# Patient Record
Sex: FEMALE | Race: BLACK OR AFRICAN AMERICAN | NOT HISPANIC OR LATINO | Employment: OTHER | URBAN - METROPOLITAN AREA
[De-identification: names, ages, dates, MRNs, and addresses within clinical notes are randomized per-mention and may not be internally consistent; named-entity substitution may affect disease eponyms.]

---

## 2021-06-18 ENCOUNTER — HISTORICAL (OUTPATIENT)
Dept: RADIATION THERAPY | Facility: HOSPITAL | Age: 61
End: 2021-06-18

## 2021-07-01 ENCOUNTER — HISTORICAL (OUTPATIENT)
Dept: RADIATION THERAPY | Facility: HOSPITAL | Age: 61
End: 2021-07-01

## 2021-07-08 ENCOUNTER — HISTORICAL (OUTPATIENT)
Dept: RADIOLOGY | Facility: HOSPITAL | Age: 61
End: 2021-07-08

## 2021-07-08 LAB — POC CREATININE: 0.8 MG/DL (ref 0.6–1.3)

## 2021-07-14 ENCOUNTER — HISTORICAL (OUTPATIENT)
Dept: RADIATION THERAPY | Facility: HOSPITAL | Age: 61
End: 2021-07-14

## 2021-07-15 ENCOUNTER — HISTORICAL (OUTPATIENT)
Dept: RADIATION THERAPY | Facility: HOSPITAL | Age: 61
End: 2021-07-15

## 2021-07-19 ENCOUNTER — HISTORICAL (OUTPATIENT)
Dept: RADIATION THERAPY | Facility: HOSPITAL | Age: 61
End: 2021-07-19

## 2021-07-21 ENCOUNTER — HISTORICAL (OUTPATIENT)
Dept: RADIATION THERAPY | Facility: HOSPITAL | Age: 61
End: 2021-07-21

## 2021-07-23 ENCOUNTER — HISTORICAL (OUTPATIENT)
Dept: RADIATION THERAPY | Facility: HOSPITAL | Age: 61
End: 2021-07-23

## 2021-07-26 ENCOUNTER — HISTORICAL (OUTPATIENT)
Dept: RADIATION THERAPY | Facility: HOSPITAL | Age: 61
End: 2021-07-26

## 2021-10-28 ENCOUNTER — HISTORICAL (OUTPATIENT)
Dept: RADIATION THERAPY | Facility: HOSPITAL | Age: 61
End: 2021-10-28

## 2022-05-04 DIAGNOSIS — M47.812 CERVICAL SPONDYLOSIS: Primary | ICD-10-CM

## 2022-05-17 ENCOUNTER — TELEPHONE (OUTPATIENT)
Dept: ADMINISTRATIVE | Facility: HOSPITAL | Age: 62
End: 2022-05-17
Payer: COMMERCIAL

## 2022-05-17 DIAGNOSIS — M47.812 CERVICAL SPONDYLOSIS: Primary | ICD-10-CM

## 2022-05-23 ENCOUNTER — DOCUMENTATION ONLY (OUTPATIENT)
Dept: NEUROSURGERY | Facility: CLINIC | Age: 62
End: 2022-05-23
Payer: COMMERCIAL

## 2022-06-28 DIAGNOSIS — D32.9 BENIGN MENINGIOMA: Primary | ICD-10-CM

## 2022-07-12 RX ORDER — MONTELUKAST SODIUM 10 MG/1
TABLET ORAL
COMMUNITY
Start: 2022-04-13 | End: 2023-01-18

## 2022-07-12 RX ORDER — AMLODIPINE BESYLATE 10 MG/1
10 TABLET ORAL DAILY
COMMUNITY
Start: 2022-06-23

## 2022-07-12 RX ORDER — GABAPENTIN 300 MG/1
CAPSULE ORAL
COMMUNITY
Start: 2022-06-11 | End: 2022-08-10

## 2022-07-12 RX ORDER — METOPROLOL SUCCINATE 100 MG/1
TABLET, EXTENDED RELEASE ORAL
COMMUNITY
Start: 2022-04-14 | End: 2023-07-17

## 2022-07-12 RX ORDER — METHOCARBAMOL 500 MG/1
500 TABLET, FILM COATED ORAL 3 TIMES DAILY PRN
COMMUNITY
Start: 2022-04-20 | End: 2023-01-18

## 2022-07-12 RX ORDER — ERYTHROMYCIN 5 MG/G
OINTMENT OPHTHALMIC
COMMUNITY
Start: 2022-07-01 | End: 2023-09-26

## 2022-07-12 RX ORDER — LOSARTAN POTASSIUM 100 MG/1
100 TABLET ORAL DAILY
COMMUNITY
Start: 2022-05-28

## 2022-07-12 RX ORDER — ATORVASTATIN CALCIUM 10 MG/1
10 TABLET, FILM COATED ORAL DAILY
COMMUNITY
Start: 2022-04-14

## 2022-07-12 RX ORDER — NEOMYCIN SULFATE, POLYMYXIN B SULFATE, HYDROCORTISONE 3.5; 10000; 1 MG/ML; [USP'U]/ML; MG/ML
SOLUTION/ DROPS AURICULAR (OTIC)
COMMUNITY
Start: 2022-04-13 | End: 2023-01-18

## 2022-07-12 RX ORDER — ESTRADIOL 1 MG/1
1 TABLET ORAL DAILY
COMMUNITY
Start: 2022-04-18

## 2022-07-14 ENCOUNTER — OFFICE VISIT (OUTPATIENT)
Dept: NEUROSURGERY | Facility: CLINIC | Age: 62
End: 2022-07-14
Payer: COMMERCIAL

## 2022-07-14 VITALS
HEART RATE: 80 BPM | RESPIRATION RATE: 16 BRPM | WEIGHT: 125 LBS | DIASTOLIC BLOOD PRESSURE: 74 MMHG | SYSTOLIC BLOOD PRESSURE: 121 MMHG | HEIGHT: 62 IN | BODY MASS INDEX: 23 KG/M2

## 2022-07-14 DIAGNOSIS — D32.9 BENIGN MENINGIOMA: Primary | ICD-10-CM

## 2022-07-14 DIAGNOSIS — M47.812 CERVICAL SPONDYLOSIS: ICD-10-CM

## 2022-07-14 PROCEDURE — 3008F BODY MASS INDEX DOCD: CPT | Mod: CPTII,,, | Performed by: NEUROLOGICAL SURGERY

## 2022-07-14 PROCEDURE — 1160F RVW MEDS BY RX/DR IN RCRD: CPT | Mod: CPTII,,, | Performed by: NEUROLOGICAL SURGERY

## 2022-07-14 PROCEDURE — 1160F PR REVIEW ALL MEDS BY PRESCRIBER/CLIN PHARMACIST DOCUMENTED: ICD-10-PCS | Mod: CPTII,,, | Performed by: NEUROLOGICAL SURGERY

## 2022-07-14 PROCEDURE — 99213 OFFICE O/P EST LOW 20 MIN: CPT | Mod: ,,, | Performed by: NEUROLOGICAL SURGERY

## 2022-07-14 PROCEDURE — 99213 PR OFFICE/OUTPT VISIT, EST, LEVL III, 20-29 MIN: ICD-10-PCS | Mod: ,,, | Performed by: NEUROLOGICAL SURGERY

## 2022-07-14 PROCEDURE — 3074F SYST BP LT 130 MM HG: CPT | Mod: CPTII,,, | Performed by: NEUROLOGICAL SURGERY

## 2022-07-14 PROCEDURE — 3008F PR BODY MASS INDEX (BMI) DOCUMENTED: ICD-10-PCS | Mod: CPTII,,, | Performed by: NEUROLOGICAL SURGERY

## 2022-07-14 PROCEDURE — 3078F DIAST BP <80 MM HG: CPT | Mod: CPTII,,, | Performed by: NEUROLOGICAL SURGERY

## 2022-07-14 PROCEDURE — 4010F ACE/ARB THERAPY RXD/TAKEN: CPT | Mod: CPTII,,, | Performed by: NEUROLOGICAL SURGERY

## 2022-07-14 PROCEDURE — 3074F PR MOST RECENT SYSTOLIC BLOOD PRESSURE < 130 MM HG: ICD-10-PCS | Mod: CPTII,,, | Performed by: NEUROLOGICAL SURGERY

## 2022-07-14 PROCEDURE — 1159F MED LIST DOCD IN RCRD: CPT | Mod: CPTII,,, | Performed by: NEUROLOGICAL SURGERY

## 2022-07-14 PROCEDURE — 4010F PR ACE/ARB THEARPY RXD/TAKEN: ICD-10-PCS | Mod: CPTII,,, | Performed by: NEUROLOGICAL SURGERY

## 2022-07-14 PROCEDURE — 3078F PR MOST RECENT DIASTOLIC BLOOD PRESSURE < 80 MM HG: ICD-10-PCS | Mod: CPTII,,, | Performed by: NEUROLOGICAL SURGERY

## 2022-07-14 PROCEDURE — 1159F PR MEDICATION LIST DOCUMENTED IN MEDICAL RECORD: ICD-10-PCS | Mod: CPTII,,, | Performed by: NEUROLOGICAL SURGERY

## 2022-07-14 RX ORDER — ASPIRIN 81 MG/1
81 TABLET ORAL DAILY
COMMUNITY

## 2022-07-14 RX ORDER — ERGOCALCIFEROL 1.25 MG/1
50000 CAPSULE ORAL
COMMUNITY

## 2022-07-14 RX ORDER — ASCORBIC ACID 125 MG
TABLET,CHEWABLE ORAL
COMMUNITY

## 2022-07-14 RX ORDER — METOPROLOL SUCCINATE 50 MG/1
50 TABLET, EXTENDED RELEASE ORAL EVERY MORNING
COMMUNITY
End: 2023-07-17

## 2022-07-14 RX ORDER — CHOLECALCIFEROL (VITAMIN D3) 125 MCG
CAPSULE ORAL
COMMUNITY

## 2022-07-14 RX ORDER — DENOSUMAB 60 MG/ML
60 INJECTION SUBCUTANEOUS
COMMUNITY

## 2022-07-14 NOTE — PROGRESS NOTES
[facility]  Neurosurgery  Established Patient      Kaity Cuevas   [id number]   1960       SUBJECTIVE:     History of Present Illness:  Patient is a 61 y.o. female who presents for a post CyberKnife treatment visit.  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  She underwent left anterior transpetrosal approach to skull base for resection of left petroclival meningioma by Dr. Pagan in [place] on 6/4/20.  She continues to have numbness in the left face, but feels it has continued to gradually improve.  She denies any new symptoms since her last visit here.  She continues to follow up with Dr. Pagan periodically via telemedicine.    The patient continues with pain in the neck on both sides with radiation across to the right shoulder and upper arm.  She was having pain into the left shoulder and upper arm, but says this has improved.  She is no longer having tingling in the hands.  She says the burning in the right forearm is not occurring much anymore either.  She went to physical therapy for about two months, which has helped some.  Although she continues to have good days and bad days.  She has difficulty balance.  She says it is no worse, no better.          Past Medical History:   Diagnosis Date    Benign meningioma     Carpal tunnel syndrome on right     Cervical disc displacement     Cervical spondylosis     Hyperlipidemia     Hypertension     Neoplasm, brain     Nephrolithiasis       Past Surgical History:   Procedure Laterality Date    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  07/26/2021    cyberknife for left petroclival tumor  04/20/2009    HYSTERECTOMY  2009    Left C5-6, C6-7 posterior foraminotomies  08/20/2014    [facility]    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  06/04/2020    [facility]    removal of kidney stone  1995      Current Outpatient Medications   Medication Sig Dispense Refill    amLODIPine (NORVASC)  10 MG tablet Take 10 mg by mouth once daily.      aspirin (ECOTRIN) 81 MG EC tablet Take 81 mg by mouth once daily.      atorvastatin (LIPITOR) 10 MG tablet Take 10 mg by mouth once daily.      biotin 5,000 mcg TbDL Take by mouth.      calcium carbonate/vitamin D3 (CALTRATE 600 + D ORAL) Take by mouth 2 (two) times a day.      denosumab (PROLIA) 60 mg/mL Syrg Inject 60 mg into the skin every 6 (six) months.      ergocalciferol (VITAMIN D2) 50,000 unit Cap Take 50,000 Units by mouth every 30 days.      erythromycin (ROMYCIN) ophthalmic ointment APPLY TO THE LEFT EYE 4 TIMES A DAY      estradioL (ESTRACE) 1 MG tablet Take 1 mg by mouth once daily.      gabapentin (NEURONTIN) 300 MG capsule Take by mouth.      losartan (COZAAR) 100 MG tablet Take 100 mg by mouth once daily.      metoprolol succinate (TOPROL-XL) 100 MG 24 hr tablet TAKE 1 TABLET BY MOUTH ONCE DAILY IN THE AFTERNOON.      metoprolol succinate (TOPROL-XL) 50 MG 24 hr tablet Take 50 mg by mouth every morning.      vitamin K2 100 mcg Cap Take by mouth.      methocarbamoL (ROBAXIN) 500 MG Tab Take 500 mg by mouth 3 (three) times daily as needed.      montelukast (SINGULAIR) 10 mg tablet TAKE 1 TABLET BY MOUTH EVERY DAY AS NEEDED FOR ALLERGIES      neomycin-polymyxin-hydrocortisone (CORTISPORIN) otic solution INSTILL 4 DROPS INTO LEFT EAR 3 TIMES A DAY       No current facility-administered medications for this visit.      Review of patient's allergies indicates:   Allergen Reactions    Codeine       Social History     Tobacco Use    Smoking status: Never Smoker    Smokeless tobacco: Never Used   Substance Use Topics    Alcohol use: Not on file      Family History   Problem Relation Age of Onset    Kidney disease Mother     Hyperlipidemia Mother     Hypertension Mother     Stroke Father     Kidney disease Father     Skin cancer Father     Hyperlipidemia Father     Hypertension Father     Thyroid cancer Sister     Hypertension  "Sister     Hypertension Brother     Diabetes Mellitus Paternal Grandmother     Lung cancer Paternal Grandfather        Review of Systems:    Pertinent items are noted in HPI.      OBJECTIVE:     Vital Signs  Pulse: 80  Resp: 16  BP: 121/74  Pain Score: 0-No pain  Height: 5' 2" (157.5 cm)  Weight: 56.7 kg (125 lb)  Body mass index is 22.86 kg/m².    General:  healthy, alert, no distress, cooperative    Head:  Normocephalic, without obvious abnormality, atraumatic    Lungs:   Breathing is quiet, non-lablored    Neurological:    Oriented to Person, Place, Time   Speech:  normal  Memory, cognition, and affect are appropriate.  Extraocular movements are intact.  Movements of facial expression are intact and symmetric.  Motor Strength: Moves all extremities spontaneously.  No muscle wasting or atrophy  Muscle strength against resistance:    Strength  Deltoids Triceps Biceps Wrist Extension Intrinsics Hand     Upper: R 5/5 5/5 5/5 5-/5 5/5 5/5     L 5/5 5/5 5/5 5-/5 5/5 5/5      Reflexes:   Right Left   Triceps 2+ 2+   Biceps 2+ 2+   Brachioradialis 2+ 2+   Patellar 2+ 3+   Achilles       Clonus: negative (both)  Mohr: positive (both)    Sensation is intact in bilateral upper extremities to a pinprick.    Gait is normal  Unable to tandem walk  Balance difficulty with one leg stand bilaterally        ASSESSMENT/PLAN:     1. Benign meningioma    - Follow up in 1 year w/ MRI    2. Cervical spondylosis and stenosis     - Continue PT  - Instructed on increasing gabapentin  - Follow up in 6 months    MRI shows no change in the size of the treated recurrent/residual meningioma.  With respect to her cervical spine, she has pretty significant multilevel stenosis throughout the cervical spine from C3 on down to C7.  There is no abnormal signal within the cord.  She has balance difficulties from her surgery in [place], but these have not really worsened over the past year.  Her arm symptoms are more manageable right now.  " We talked about various surgical options, but I think we can just watch this and will see her again in 6 months.      I, Dr. Daniel Duenas, personally performed the services described in this documentation. All medical record entries made by the scribe, Areli Crespo, were at my direction and in my presence.  I have reviewed the chart and agree that the record reflects my personal performance and is accurate and complete. Dnaiel Duenas MD.  12:55 PM 07/14/2022         Daniel Duenas MD [facility]

## 2022-09-29 ENCOUNTER — TELEPHONE (OUTPATIENT)
Dept: NEUROSURGERY | Facility: CLINIC | Age: 62
End: 2022-09-29
Payer: COMMERCIAL

## 2022-09-29 DIAGNOSIS — M47.812 CERVICAL SPONDYLOSIS: Primary | ICD-10-CM

## 2023-01-18 ENCOUNTER — OFFICE VISIT (OUTPATIENT)
Dept: NEUROSURGERY | Facility: CLINIC | Age: 63
End: 2023-01-18
Payer: MEDICARE

## 2023-01-18 VITALS
DIASTOLIC BLOOD PRESSURE: 77 MMHG | WEIGHT: 121 LBS | HEART RATE: 74 BPM | BODY MASS INDEX: 22.26 KG/M2 | SYSTOLIC BLOOD PRESSURE: 133 MMHG | HEIGHT: 62 IN | RESPIRATION RATE: 18 BRPM

## 2023-01-18 DIAGNOSIS — D32.9 BENIGN MENINGIOMA: ICD-10-CM

## 2023-01-18 DIAGNOSIS — M54.50 ACUTE BILATERAL LOW BACK PAIN WITHOUT SCIATICA: ICD-10-CM

## 2023-01-18 DIAGNOSIS — M47.812 CERVICAL SPONDYLOSIS: Primary | ICD-10-CM

## 2023-01-18 PROCEDURE — 99213 OFFICE O/P EST LOW 20 MIN: CPT | Mod: ,,, | Performed by: NEUROLOGICAL SURGERY

## 2023-01-18 PROCEDURE — 99213 PR OFFICE/OUTPT VISIT, EST, LEVL III, 20-29 MIN: ICD-10-PCS | Mod: ,,, | Performed by: NEUROLOGICAL SURGERY

## 2023-01-18 RX ORDER — OFLOXACIN 3 MG/ML
5 SOLUTION AURICULAR (OTIC) 4 TIMES DAILY
COMMUNITY
End: 2023-09-26

## 2023-01-18 RX ORDER — TERBINAFINE HYDROCHLORIDE 250 MG/1
250 TABLET ORAL DAILY
COMMUNITY
End: 2023-09-26

## 2023-01-18 NOTE — PROGRESS NOTES
[facility]  Neurosurgery        Kaity Cuevas   [id number]   1960       SUBJECTIVE:     CHIEF COMPLAINT:  neck pain     HPI:  Kaity Cuevas is a 62 y.o. female who presents for a 6 month follow up for cervical complaints.  She has been going to physical therapy since her last visit here.  She was discharged last week with a home exercise program.  She continues with intermittent pain in the neck on both sides with radiation across to the right greater than left shoulders and right upper arm.  She has intermittent numbness in the right thumb and index finger.  She is no longer having burning in the right forearm.  She continues to have difficulty with balance, but does not feel it is worsening.  She has noticed since finishing therapy that she will have pain and pulling in the lower back with bending forward.  She experienced some numbness in the left leg last Saturday after sitting for a short time, but otherwise denies any lower extremity symptoms.    She is also followed here for a meningioma s/p resection by Dr. DIETER Pagan at [facility] in [place] in June 2020, followed by CyberKnife treatment of the residual tumor in July 2020.  She has intermittent pains in the left side of her head that have been present since the surgery.  She feels the pains are occurring more often and are lasting longer.  She is scheduled to follow up here with repeat MRI brain in July.    Review of patient's allergies indicates:   Allergen Reactions    Codeine     Sulfa (sulfonamide antibiotics) Rash    Sulfamethoxazole-trimethoprim Rash       Current Outpatient Medications:     amLODIPine (NORVASC) 10 MG tablet, Take 10 mg by mouth once daily., Disp: , Rfl:     aspirin (ECOTRIN) 81 MG EC tablet, Take 81 mg by mouth once daily., Disp: , Rfl:     atorvastatin (LIPITOR) 10 MG tablet, Take 10 mg by mouth once daily., Disp: , Rfl:     biotin 5,000 mcg TbDL, Take by mouth., Disp: , Rfl:     calcium carbonate/vitamin D3  (CALTRATE 600 + D ORAL), Take by mouth 2 (two) times a day., Disp: , Rfl:     denosumab (PROLIA) 60 mg/mL Syrg, Inject 60 mg into the skin every 6 (six) months., Disp: , Rfl:     ergocalciferol (ERGOCALCIFEROL) 50,000 unit Cap, Take 50,000 Units by mouth every 30 days., Disp: , Rfl:     erythromycin (ROMYCIN) ophthalmic ointment, APPLY TO THE LEFT EYE 4 TIMES A DAY, Disp: , Rfl:     estradioL (ESTRACE) 1 MG tablet, Take 1 mg by mouth once daily., Disp: , Rfl:     gabapentin (NEURONTIN) 300 MG capsule, Take 1 capsule (300 mg total) by mouth 3 (three) times daily., Disp: 90 capsule, Rfl: 2    losartan (COZAAR) 100 MG tablet, Take 100 mg by mouth once daily., Disp: , Rfl:     metoprolol succinate (TOPROL-XL) 100 MG 24 hr tablet, TAKE 1 TABLET BY MOUTH ONCE DAILY IN THE AFTERNOON., Disp: , Rfl:     metoprolol succinate (TOPROL-XL) 50 MG 24 hr tablet, Take 50 mg by mouth every morning., Disp: , Rfl:     ofloxacin (FLOXIN) 0.3 % otic solution, 5 drops 4 (four) times daily., Disp: , Rfl:     terbinafine HCL (LAMISIL) 250 mg tablet, Take 250 mg by mouth once daily., Disp: , Rfl:     vitamin K2 100 mcg Cap, Take by mouth., Disp: , Rfl:    Past Medical History:   Diagnosis Date    Benign meningioma     Carpal tunnel syndrome on right     Cervical disc displacement     Cervical spondylosis     Hyperlipidemia     Hypertension     Neoplasm, brain     Nephrolithiasis      Past Surgical History:   Procedure Laterality Date    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  07/26/2021    cyberknife for left petroclival tumor  04/20/2009    HYSTERECTOMY  2009    Left C5-6, C6-7 posterior foraminotomies  08/20/2014    [facility]    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  06/04/2020    [facility]    removal of kidney stone  1995     Family History   Problem Relation Age of Onset    Kidney disease Mother     Hyperlipidemia Mother     Hypertension Mother     Stroke Father     Kidney disease Father     Skin cancer Father      Hyperlipidemia Father     Hypertension Father     Thyroid cancer Sister     Hypertension Sister     Hypertension Brother     Diabetes Mellitus Paternal Grandmother     Lung cancer Paternal Grandfather      Social History     Tobacco Use    Smoking status: Never    Smokeless tobacco: Never        Review of Systems:    Pertinent items are noted in HPI.        OBJECTIVE:     Vital Signs (Most Recent)  Pulse: 74 (01/18/23 1442)  Resp: 18 (01/18/23 1442)  BP: 133/77 (01/18/23 1442)    Physical Exam:  General:  Pleasant. Well-nourished. Alert. No acute distress.    Head:  Normocephalic, without obvious abnormality, atraumatic    Lungs:  Quiet, non-labored     Neurological:    Oriented to Person, Place, Time   Speech:  normal  Memory, cognition, and affect are appropriate.    Muscle strength against resistance:    Strength  Deltoids Triceps Biceps Wrist Extension Intrinsics Hand    Upper: R 5/5 5/5 5/5 5/5 5/5 5/5    L 5/5 5/5 5/5 5/5 5/5 5/5     Reflexes:   Right Left   Triceps 2+ 2+   Biceps 2+ 2+   Brachioradialis 2+ 2+   Patellar 2+ 3+     Sensation is intact in bilateral upper extremities to a pinprick.    Mohr: positive (both)    Gait:  Stiff, unsteady when turns      Musculoskeletal:   Cervical ROM: Limited extension and right rotation        ASSESSMENT/PLAN:     1. Cervical spondylosis    2. Acute bilateral low back pain without sciatica  - Ambulatory referral/consult to Physical/Occupational Therapy for lower back pain     - Follow up as scheduled on 7/17/23 with MRI brain      I, Dr. Daniel Duenas, personally performed the services described in this documentation. All medical record entries made by the scribe, Areli Crespo RN, were at my direction and in my presence.  I have reviewed the chart and agree that the record reflects my personal performance and is accurate and complete. Daniel Duenas MD.  2:52 PM 01/18/2023       Daniel Duenas MD [facility]

## 2023-05-11 DIAGNOSIS — M47.812 SPONDYLOSIS WITHOUT MYELOPATHY OR RADICULOPATHY, CERVICAL REGION: ICD-10-CM

## 2023-05-11 DIAGNOSIS — M47.812 CERVICAL SPONDYLOSIS WITHOUT MYELOPATHY: ICD-10-CM

## 2023-05-12 RX ORDER — GABAPENTIN 300 MG/1
CAPSULE ORAL
Qty: 90 CAPSULE | Refills: 2 | Status: SHIPPED | OUTPATIENT
Start: 2023-05-12 | End: 2023-10-16

## 2023-07-03 ENCOUNTER — APPOINTMENT (OUTPATIENT)
Dept: RADIOLOGY | Facility: HOSPITAL | Age: 63
End: 2023-07-03
Attending: NEUROLOGICAL SURGERY
Payer: MEDICARE

## 2023-07-03 DIAGNOSIS — D32.9 BENIGN MENINGIOMA: ICD-10-CM

## 2023-07-03 LAB
CREAT SERPL-MCNC: 1 MG/DL (ref 0.5–1.4)
SAMPLE: NORMAL

## 2023-07-03 PROCEDURE — 25500020 PHARM REV CODE 255: Performed by: NEUROLOGICAL SURGERY

## 2023-07-03 PROCEDURE — 70553 MRI BRAIN STEM W/O & W/DYE: CPT | Mod: TC

## 2023-07-03 PROCEDURE — A9577 INJ MULTIHANCE: HCPCS | Performed by: NEUROLOGICAL SURGERY

## 2023-07-03 RX ADMIN — GADOBENATE DIMEGLUMINE 10 ML: 529 INJECTION, SOLUTION INTRAVENOUS at 09:07

## 2023-07-17 ENCOUNTER — OFFICE VISIT (OUTPATIENT)
Dept: NEUROSURGERY | Facility: CLINIC | Age: 63
End: 2023-07-17
Payer: MEDICARE

## 2023-07-17 VITALS
DIASTOLIC BLOOD PRESSURE: 76 MMHG | RESPIRATION RATE: 16 BRPM | HEART RATE: 68 BPM | SYSTOLIC BLOOD PRESSURE: 134 MMHG | HEIGHT: 62 IN | WEIGHT: 118 LBS | BODY MASS INDEX: 21.71 KG/M2

## 2023-07-17 DIAGNOSIS — D32.9 BENIGN MENINGIOMA: Primary | ICD-10-CM

## 2023-07-17 PROCEDURE — 99213 OFFICE O/P EST LOW 20 MIN: CPT | Mod: ,,, | Performed by: NEUROLOGICAL SURGERY

## 2023-07-17 PROCEDURE — 99213 PR OFFICE/OUTPT VISIT, EST, LEVL III, 20-29 MIN: ICD-10-PCS | Mod: ,,, | Performed by: NEUROLOGICAL SURGERY

## 2023-07-17 RX ORDER — METOPROLOL SUCCINATE 50 MG/1
1 TABLET, EXTENDED RELEASE ORAL EVERY MORNING
COMMUNITY

## 2023-07-17 RX ORDER — ASPIRIN 325 MG
TABLET, DELAYED RELEASE (ENTERIC COATED) ORAL
COMMUNITY
Start: 2023-05-08 | End: 2023-07-17

## 2023-07-17 RX ORDER — MONTELUKAST SODIUM 10 MG/1
TABLET ORAL
COMMUNITY
End: 2023-09-26

## 2023-07-17 RX ORDER — CENEGERMIN-BKBJ 20 UG/ML
SOLUTION/ DROPS OPHTHALMIC
COMMUNITY
End: 2023-09-26

## 2023-07-17 NOTE — PROGRESS NOTES
[facility]  Neurosurgery  Established Patient      Kaity Cuevas   [id number]   1960       SUBJECTIVE:     History of Present Illness:  Patient is a 62 y.o. female who presents for a two year post op appointment.  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  She underwent left anterior transpetrosal approach to skull base for resection of left petroclival meningioma by Dr. Pagan in [place] on 6/4/20.  She continues with decreased sensation in the left face.  She is having problems with the left eye due to this.  She says the lining of her cornea is breaking down.  She is under the care of Dr. Anderson regarding this.  They have discussed corneal transplant, but he does not recommend proceeding just yet due to the risk of re-injury.  He has instructed her to wear a patch over the eye when outdoors.          Past Medical History:   Diagnosis Date    Benign meningioma     Carpal tunnel syndrome on right     Cervical disc displacement     Cervical spondylosis     Hyperlipidemia     Hypertension     Neoplasm, brain     Nephrolithiasis       Past Surgical History:   Procedure Laterality Date    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  07/26/2021    cyberknife for left petroclival tumor  04/20/2009    HYSTERECTOMY  2009    Left C5-6, C6-7 posterior foraminotomies  08/20/2014    [facility]    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  06/04/2020    [facility]    removal of kidney stone  1995      Outpatient Encounter Medications as of 7/17/2023   Medication Sig Dispense Refill    amLODIPine (NORVASC) 10 MG tablet Take 10 mg by mouth once daily.      aspirin (ECOTRIN) 81 MG EC tablet Take 81 mg by mouth once daily.      atorvastatin (LIPITOR) 10 MG tablet Take 10 mg by mouth once daily.      biotin 5,000 mcg TbDL Take by mouth.      denosumab (PROLIA) 60 mg/mL Syrg Inject 60 mg into the skin every 6 (six) months.      ergocalciferol (ERGOCALCIFEROL)  50,000 unit Cap Take 50,000 Units by mouth every 30 days.      erythromycin (ROMYCIN) ophthalmic ointment APPLY TO THE LEFT EYE 4 TIMES A DAY      estradioL (ESTRACE) 1 MG tablet Take 1 mg by mouth once daily.      gabapentin (NEURONTIN) 300 MG capsule TAKE 1 CAPSULE BY MOUTH THREE TIMES A DAY 90 capsule 2    losartan (COZAAR) 100 MG tablet Take 100 mg by mouth once daily.      metoprolol succinate (TOPROL-XL) 50 MG 24 hr tablet Take 1 tablet by mouth every morning.      ofloxacin (FLOXIN) 0.3 % otic solution 5 drops 4 (four) times daily.      vitamin K2 100 mcg Cap Take by mouth.      [DISCONTINUED] calcium carbonate/vitamin D3 (CALTRATE 600 + D ORAL) Take by mouth 2 (two) times a day.      [DISCONTINUED] metoprolol succinate (TOPROL-XL) 100 MG 24 hr tablet TAKE 1 TABLET BY MOUTH ONCE DAILY IN THE AFTERNOON.      cenegermin-bkbj (OXERVATE) 0.002 % Drop       montelukast (SINGULAIR) 10 mg tablet as needed.      terbinafine HCL (LAMISIL) 250 mg tablet Take 250 mg by mouth once daily.      [DISCONTINUED] cholecalciferol, vitamin D3, 1,250 mcg (50,000 unit) capsule every 30 days.      [DISCONTINUED] metoprolol succinate (TOPROL-XL) 50 MG 24 hr tablet Take 50 mg by mouth every morning.       No facility-administered encounter medications on file as of 7/17/2023.      Review of patient's allergies indicates:   Allergen Reactions    Codeine     Sulfa (sulfonamide antibiotics) Rash    Sulfamethoxazole-trimethoprim Rash      Social History     Tobacco Use    Smoking status: Never    Smokeless tobacco: Never   Substance Use Topics    Alcohol use: Not on file      Family History   Problem Relation Age of Onset    Kidney disease Mother     Hyperlipidemia Mother     Hypertension Mother     Stroke Father     Kidney disease Father     Skin cancer Father     Hyperlipidemia Father     Hypertension Father     Thyroid cancer Sister     Hypertension Sister     Hypertension Brother     Diabetes Mellitus Paternal Grandmother     Lung  "cancer Paternal Grandfather        Review of Systems:    Pertinent items are noted in HPI.      OBJECTIVE:     Vital Signs  Pulse: 68  Resp: 16  BP: 134/76  Pain Score: 0-No pain  Height: 5' 2" (157.5 cm)  Weight: 53.5 kg (118 lb)  Body mass index is 21.58 kg/m².    General:  alert, no distress, cooperative    Head:  Normocephalic, without obvious abnormality, atraumatic    Lungs:   Breathing is quiet, non-lablored    Neurological:    Oriented to Person, Place, Time   Speech:  normal  Memory, cognition, and affect are appropriate.  Extraocular movements are intact.  Movements of facial expression are intact and symmetric.  Motor Strength: Moves all extremities spontaneously with good tone.  No abnormal movements seen.    Gait is normal    Her 2 year post radiosurgery MRI shows no change in the residual tumor as well as no change in the untreated right frontal convexity meningioma.    ASSESSMENT/PLAN:     1. Benign meningioma  - MRI Brain W WO Contrast; Future  - Follow up in 1 year with MRI (3 yrs post CK)        I, Dr. Daniel Duenas, personally performed the services described in this documentation. All medical record entries made by the scribe, Areli Crespo RN, were at my direction and in my presence.  I have reviewed the chart and agree that the record reflects my personal performance and is accurate and complete. Daniel Duenas MD.  2:45 PM 07/17/2023       Daniel Duenas MD FACS [facility]   "

## 2023-08-06 ENCOUNTER — HOSPITAL ENCOUNTER (EMERGENCY)
Facility: HOSPITAL | Age: 63
Discharge: HOME | End: 2023-08-07
Attending: EMERGENCY MEDICINE
Payer: COMMERCIAL

## 2023-08-06 DIAGNOSIS — S39.93XA BLUNT TRAUMATIC INJURY OF THORACO-ABDOMINO-PELVIC REGION: Primary | ICD-10-CM

## 2023-08-06 DIAGNOSIS — S30.1XXA CONTUSION OF ABDOMINAL WALL, INITIAL ENCOUNTER: ICD-10-CM

## 2023-08-06 DIAGNOSIS — S29.9XXA BLUNT TRAUMATIC INJURY OF THORACO-ABDOMINO-PELVIC REGION: Primary | ICD-10-CM
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
## 2023-08-06 DIAGNOSIS — S39.91XA BLUNT TRAUMATIC INJURY OF THORACO-ABDOMINO-PELVIC REGION: Primary | ICD-10-CM

## 2023-08-06 LAB
ABORH RETYPE: NORMAL
ALBUMIN SERPL-MCNC: 4.2 G/DL (ref 3.4–4.8)
ALBUMIN/GLOB SERPL: 1.3 RATIO (ref 1.1–2)
ALP SERPL-CCNC: 66 UNIT/L (ref 40–150)
ALT SERPL-CCNC: 16 UNIT/L (ref 0–55)
AMPHET UR QL SCN: NEGATIVE
APPEARANCE UR: CLEAR
APTT PPP: 28.3 SECONDS (ref 23.2–33.7)
AST SERPL-CCNC: 27 UNIT/L (ref 5–34)
BACTERIA #/AREA URNS AUTO: NORMAL /HPF
BARBITURATE SCN PRESENT UR: NEGATIVE
BASOPHILS # BLD AUTO: 0.04 X10(3)/MCL
BASOPHILS NFR BLD AUTO: 0.6 %
BENZODIAZ UR QL SCN: NEGATIVE
BILIRUB UR QL STRIP.AUTO: NEGATIVE
BILIRUBIN DIRECT+TOT PNL SERPL-MCNC: 0.4 MG/DL
BUN SERPL-MCNC: 15.2 MG/DL (ref 9.8–20.1)
CALCIUM SERPL-MCNC: 10.9 MG/DL (ref 8.4–10.2)
CANNABINOIDS UR QL SCN: NEGATIVE
CHLORIDE SERPL-SCNC: 102 MMOL/L (ref 98–107)
CO2 SERPL-SCNC: 25 MMOL/L (ref 23–31)
COCAINE UR QL SCN: NEGATIVE
COLOR UR: YELLOW
CREAT SERPL-MCNC: 0.77 MG/DL (ref 0.55–1.02)
EOSINOPHIL # BLD AUTO: 0.11 X10(3)/MCL (ref 0–0.9)
EOSINOPHIL NFR BLD AUTO: 1.5 %
ERYTHROCYTE [DISTWIDTH] IN BLOOD BY AUTOMATED COUNT: 13.4 % (ref 11.5–17)
ETHANOL SERPL-MCNC: <10 MG/DL
FENTANYL UR QL SCN: NEGATIVE
GFR SERPLBLD CREATININE-BSD FMLA CKD-EPI: >60 MLS/MIN/1.73/M2
GLOBULIN SER-MCNC: 3.2 GM/DL (ref 2.4–3.5)
GLUCOSE SERPL-MCNC: 96 MG/DL (ref 82–115)
GLUCOSE UR QL STRIP.AUTO: NEGATIVE
GROUP & RH: NORMAL
HCT VFR BLD AUTO: 35.3 % (ref 37–47)
HGB BLD-MCNC: 12.3 G/DL (ref 12–16)
IMM GRANULOCYTES # BLD AUTO: 0.01 X10(3)/MCL (ref 0–0.04)
IMM GRANULOCYTES NFR BLD AUTO: 0.1 %
INDIRECT COOMBS GEL: NORMAL
INR PPP: 1
KETONES UR QL STRIP.AUTO: NEGATIVE
LACTATE SERPL-SCNC: 0.9 MMOL/L (ref 0.5–2.2)
LEUKOCYTE ESTERASE UR QL STRIP.AUTO: NEGATIVE
LYMPHOCYTES # BLD AUTO: 1.65 X10(3)/MCL (ref 0.6–4.6)
LYMPHOCYTES NFR BLD AUTO: 22.9 %
MCH RBC QN AUTO: 28.4 PG (ref 27–31)
MCHC RBC AUTO-ENTMCNC: 34.8 G/DL (ref 33–36)
MCV RBC AUTO: 81.5 FL (ref 80–94)
MDMA UR QL SCN: NEGATIVE
MONOCYTES # BLD AUTO: 0.68 X10(3)/MCL (ref 0.1–1.3)
MONOCYTES NFR BLD AUTO: 9.5 %
NEUTROPHILS # BLD AUTO: 4.7 X10(3)/MCL (ref 2.1–9.2)
NEUTROPHILS NFR BLD AUTO: 65.4 %
NITRITE UR QL STRIP.AUTO: NEGATIVE
NRBC BLD AUTO-RTO: 0 %
OPIATES UR QL SCN: NEGATIVE
PCP UR QL: NEGATIVE
PH UR STRIP.AUTO: 7.5 [PH]
PH UR: 7.5 [PH] (ref 3–11)
PLATELET # BLD AUTO: 261 X10(3)/MCL (ref 130–400)
PMV BLD AUTO: 10.5 FL (ref 7.4–10.4)
POTASSIUM SERPL-SCNC: 3.5 MMOL/L (ref 3.5–5.1)
PROT SERPL-MCNC: 7.4 GM/DL (ref 5.8–7.6)
PROT UR QL STRIP.AUTO: NEGATIVE
PROTHROMBIN TIME: 12.7 SECONDS (ref 12.5–14.5)
RBC # BLD AUTO: 4.33 X10(6)/MCL (ref 4.2–5.4)
RBC #/AREA URNS AUTO: <5 /HPF
RBC UR QL AUTO: NEGATIVE
SODIUM SERPL-SCNC: 137 MMOL/L (ref 136–145)
SP GR UR STRIP.AUTO: 1.02 (ref 1–1.03)
SPECIMEN OUTDATE: NORMAL
SQUAMOUS #/AREA URNS AUTO: <5 /HPF
UROBILINOGEN UR STRIP-ACNC: 0.2
WBC # SPEC AUTO: 7.19 X10(3)/MCL (ref 4.5–11.5)
WBC #/AREA URNS AUTO: <5 /HPF

## 2023-08-06 PROCEDURE — 85730 THROMBOPLASTIN TIME PARTIAL: CPT | Performed by: EMERGENCY MEDICINE

## 2023-08-06 PROCEDURE — 80053 COMPREHEN METABOLIC PANEL: CPT | Performed by: EMERGENCY MEDICINE

## 2023-08-06 PROCEDURE — 85610 PROTHROMBIN TIME: CPT | Performed by: EMERGENCY MEDICINE

## 2023-08-06 PROCEDURE — 96375 TX/PRO/DX INJ NEW DRUG ADDON: CPT | Mod: 59

## 2023-08-06 PROCEDURE — 96361 HYDRATE IV INFUSION ADD-ON: CPT

## 2023-08-06 PROCEDURE — 85025 COMPLETE CBC W/AUTO DIFF WBC: CPT | Performed by: EMERGENCY MEDICINE

## 2023-08-06 PROCEDURE — 99285 EMERGENCY DEPT VISIT HI MDM: CPT | Mod: 25

## 2023-08-06 PROCEDURE — 80307 DRUG TEST PRSMV CHEM ANLYZR: CPT | Performed by: EMERGENCY MEDICINE

## 2023-08-06 PROCEDURE — G0390 TRAUMA RESPONS W/HOSP CRITI: HCPCS | Performed by: EMERGENCY MEDICINE

## 2023-08-06 PROCEDURE — 96374 THER/PROPH/DIAG INJ IV PUSH: CPT | Mod: 59

## 2023-08-06 PROCEDURE — 25500020 PHARM REV CODE 255: Performed by: EMERGENCY MEDICINE

## 2023-08-06 PROCEDURE — 82077 ASSAY SPEC XCP UR&BREATH IA: CPT | Performed by: EMERGENCY MEDICINE

## 2023-08-06 PROCEDURE — 99900035 HC TECH TIME PER 15 MIN (STAT)

## 2023-08-06 PROCEDURE — 83605 ASSAY OF LACTIC ACID: CPT | Performed by: EMERGENCY MEDICINE

## 2023-08-06 PROCEDURE — 90471 IMMUNIZATION ADMIN: CPT | Performed by: EMERGENCY MEDICINE

## 2023-08-06 PROCEDURE — 81001 URINALYSIS AUTO W/SCOPE: CPT | Performed by: EMERGENCY MEDICINE

## 2023-08-06 PROCEDURE — 86900 BLOOD TYPING SEROLOGIC ABO: CPT | Performed by: EMERGENCY MEDICINE

## 2023-08-06 PROCEDURE — 63600175 PHARM REV CODE 636 W HCPCS

## 2023-08-06 PROCEDURE — 90715 TDAP VACCINE 7 YRS/> IM: CPT | Performed by: EMERGENCY MEDICINE

## 2023-08-06 PROCEDURE — 63600175 PHARM REV CODE 636 W HCPCS: Performed by: EMERGENCY MEDICINE

## 2023-08-06 RX ORDER — MORPHINE SULFATE 4 MG/ML
4 INJECTION, SOLUTION INTRAMUSCULAR; INTRAVENOUS
Status: DISCONTINUED | OUTPATIENT
Start: 2023-08-06 | End: 2023-08-07 | Stop reason: HOSPADM

## 2023-08-06 RX ORDER — ONDANSETRON 2 MG/ML
4 INJECTION INTRAMUSCULAR; INTRAVENOUS
Status: COMPLETED | OUTPATIENT
Start: 2023-08-06 | End: 2023-08-06

## 2023-08-06 RX ORDER — ONDANSETRON 2 MG/ML
INJECTION INTRAMUSCULAR; INTRAVENOUS
Status: COMPLETED
Start: 2023-08-06 | End: 2023-08-06

## 2023-08-06 RX ORDER — FENTANYL CITRATE 50 UG/ML
50 INJECTION, SOLUTION INTRAMUSCULAR; INTRAVENOUS
Status: COMPLETED | OUTPATIENT
Start: 2023-08-06 | End: 2023-08-06

## 2023-08-06 RX ADMIN — SODIUM CHLORIDE, POTASSIUM CHLORIDE, SODIUM LACTATE AND CALCIUM CHLORIDE 1000 ML: 600; 310; 30; 20 INJECTION, SOLUTION INTRAVENOUS at 07:08

## 2023-08-06 RX ADMIN — IOPAMIDOL 100 ML: 755 INJECTION, SOLUTION INTRAVENOUS at 07:08

## 2023-08-06 RX ADMIN — ONDANSETRON 4 MG: 2 INJECTION INTRAMUSCULAR; INTRAVENOUS at 07:08

## 2023-08-06 RX ADMIN — TETANUS TOXOID, REDUCED DIPHTHERIA TOXOID AND ACELLULAR PERTUSSIS VACCINE, ADSORBED 0.5 ML: 5; 2.5; 8; 8; 2.5 SUSPENSION INTRAMUSCULAR at 07:08

## 2023-08-06 RX ADMIN — FENTANYL CITRATE 50 MCG: 50 INJECTION, SOLUTION INTRAMUSCULAR; INTRAVENOUS at 07:08

## 2023-08-06 RX ADMIN — ONDANSETRON 4 MG: 2 INJECTION INTRAMUSCULAR; INTRAVENOUS at 09:08

## 2023-08-07 VITALS
TEMPERATURE: 98 F | RESPIRATION RATE: 11 BRPM | OXYGEN SATURATION: 97 % | WEIGHT: 117 LBS | SYSTOLIC BLOOD PRESSURE: 142 MMHG | HEIGHT: 62 IN | DIASTOLIC BLOOD PRESSURE: 83 MMHG | HEART RATE: 84 BPM | BODY MASS INDEX: 21.53 KG/M2

## 2023-08-07 RX ORDER — METHOCARBAMOL 500 MG/1
1000 TABLET, FILM COATED ORAL 3 TIMES DAILY
Qty: 30 TABLET | Refills: 0 | Status: SHIPPED | OUTPATIENT
Start: 2023-08-07 | End: 2023-08-12

## 2023-08-07 RX ORDER — KETOROLAC TROMETHAMINE 10 MG/1
10 TABLET, FILM COATED ORAL EVERY 6 HOURS PRN
Qty: 20 TABLET | Refills: 0 | Status: SHIPPED | OUTPATIENT
Start: 2023-08-07 | End: 2023-08-12
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
## 2023-09-01 ENCOUNTER — TELEPHONE (OUTPATIENT)
Dept: NEUROSURGERY | Facility: CLINIC | Age: 63
End: 2023-09-01
Payer: MEDICARE
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
## 2023-09-05 DIAGNOSIS — M54.50 ACUTE BILATERAL LOW BACK PAIN WITHOUT SCIATICA: Primary | ICD-10-CM

## 2023-09-05 DIAGNOSIS — M47.812 CERVICAL SPONDYLOSIS: ICD-10-CM

## 2023-09-27 ENCOUNTER — OFFICE VISIT (OUTPATIENT)
Dept: NEUROSURGERY | Facility: CLINIC | Age: 63
End: 2023-09-27
Payer: MEDICARE

## 2023-09-27 ENCOUNTER — HOSPITAL ENCOUNTER (OUTPATIENT)
Dept: RADIOLOGY | Facility: HOSPITAL | Age: 63
Discharge: HOME | End: 2023-09-27
Attending: PHYSICIAN ASSISTANT
Payer: MEDICARE

## 2023-09-27 VITALS
HEIGHT: 62 IN | HEART RATE: 80 BPM | RESPIRATION RATE: 16 BRPM | DIASTOLIC BLOOD PRESSURE: 81 MMHG | WEIGHT: 115 LBS | BODY MASS INDEX: 21.16 KG/M2 | SYSTOLIC BLOOD PRESSURE: 134 MMHG

## 2023-09-27 DIAGNOSIS — M51.36 LUMBAR DEGENERATIVE DISC DISEASE: ICD-10-CM

## 2023-09-27 DIAGNOSIS — M47.12 CERVICAL SPONDYLOSIS WITH MYELOPATHY: Primary | ICD-10-CM

## 2023-09-27 DIAGNOSIS — M47.812 CERVICAL SPONDYLOSIS: ICD-10-CM

## 2023-09-27 DIAGNOSIS — M54.50 ACUTE BILATERAL LOW BACK PAIN WITHOUT SCIATICA: ICD-10-CM

## 2023-09-27 PROCEDURE — 99215 OFFICE O/P EST HI 40 MIN: CPT | Mod: ,,, | Performed by: PHYSICIAN ASSISTANT

## 2023-09-27 PROCEDURE — 99215 PR OFFICE/OUTPT VISIT, EST, LEVL V, 40-54 MIN: ICD-10-PCS | Mod: ,,, | Performed by: PHYSICIAN ASSISTANT

## 2023-09-27 PROCEDURE — 72114 X-RAY EXAM L-S SPINE BENDING: CPT | Mod: TC

## 2023-09-27 PROCEDURE — 72052 X-RAY EXAM NECK SPINE 6/>VWS: CPT | Mod: TC

## 2023-09-27 RX ORDER — IPRATROPIUM BROMIDE 21 UG/1
2 SPRAY, METERED NASAL
COMMUNITY

## 2023-09-27 RX ORDER — TERBINAFINE HYDROCHLORIDE 250 MG/1
250 TABLET ORAL DAILY
COMMUNITY
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
## 2023-10-13 DIAGNOSIS — M47.812 SPONDYLOSIS WITHOUT MYELOPATHY OR RADICULOPATHY, CERVICAL REGION: ICD-10-CM

## 2023-10-13 DIAGNOSIS — M47.812 CERVICAL SPONDYLOSIS WITHOUT MYELOPATHY: ICD-10-CM

## 2023-10-16 RX ORDER — GABAPENTIN 300 MG/1
CAPSULE ORAL
Qty: 90 CAPSULE | Refills: 2 | Status: SHIPPED | OUTPATIENT
Start: 2023-10-16 | End: 2024-02-26

## 2023-11-09 ENCOUNTER — TELEPHONE (OUTPATIENT)
Dept: NEUROSURGERY | Facility: CLINIC | Age: 63
End: 2023-11-09
Payer: MEDICARE

## 2023-12-20 ENCOUNTER — OFFICE VISIT (OUTPATIENT)
Dept: NEUROSURGERY | Facility: CLINIC | Age: 63
End: 2023-12-20
Payer: MEDICARE

## 2023-12-20 VITALS
HEART RATE: 69 BPM | HEIGHT: 62 IN | SYSTOLIC BLOOD PRESSURE: 124 MMHG | WEIGHT: 110 LBS | DIASTOLIC BLOOD PRESSURE: 81 MMHG | RESPIRATION RATE: 18 BRPM | BODY MASS INDEX: 20.24 KG/M2

## 2023-12-20 DIAGNOSIS — D32.9 BENIGN MENINGIOMA: ICD-10-CM

## 2023-12-20 DIAGNOSIS — G56.01 CARPAL TUNNEL SYNDROME OF RIGHT WRIST: ICD-10-CM

## 2023-12-20 DIAGNOSIS — M47.12 CERVICAL SPONDYLOSIS WITH MYELOPATHY: Primary | ICD-10-CM

## 2023-12-20 DIAGNOSIS — M51.36 LUMBAR DEGENERATIVE DISC DISEASE: ICD-10-CM

## 2023-12-20 PROCEDURE — 99214 OFFICE O/P EST MOD 30 MIN: CPT | Mod: ,,, | Performed by: NEUROLOGICAL SURGERY

## 2023-12-20 RX ORDER — METOPROLOL SUCCINATE 100 MG/1
100 TABLET, EXTENDED RELEASE ORAL NIGHTLY
COMMUNITY

## 2023-12-20 RX ORDER — GLYCERIN 0.25 %
DROPS OPHTHALMIC (EYE)
Status: ON HOLD | COMMUNITY
End: 2024-06-07 | Stop reason: HOSPADM
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
## 2024-02-25 DIAGNOSIS — M47.812 SPONDYLOSIS WITHOUT MYELOPATHY OR RADICULOPATHY, CERVICAL REGION: ICD-10-CM

## 2024-02-25 DIAGNOSIS — M47.812 CERVICAL SPONDYLOSIS WITHOUT MYELOPATHY: ICD-10-CM

## 2024-02-26 RX ORDER — GABAPENTIN 300 MG/1
CAPSULE ORAL
Qty: 90 CAPSULE | Refills: 2 | Status: SHIPPED | OUTPATIENT
Start: 2024-02-26

## 2024-04-22 ENCOUNTER — TELEPHONE (OUTPATIENT)
Dept: NEUROSURGERY | Facility: CLINIC | Age: 64
End: 2024-04-22
Payer: MEDICARE

## 2024-04-22 DIAGNOSIS — G56.01 CARPAL TUNNEL SYNDROME OF RIGHT WRIST: Primary | ICD-10-CM

## 2024-04-22 NOTE — TELEPHONE ENCOUNTER
I spoke with the patient to advise her the referral has been entered. She verbalized understanding. I did advise her to give us a call if she does not hear from Dr. Stark's office in about a week.

## 2024-05-09 ENCOUNTER — TELEPHONE (OUTPATIENT)
Dept: NEUROSURGERY | Facility: CLINIC | Age: 64
End: 2024-05-09
Payer: MEDICARE

## 2024-05-09 DIAGNOSIS — S92.009A CLOSED NONDISPLACED FRACTURE OF CALCANEUS, UNSPECIFIED LATERALITY, UNSPECIFIED PORTION OF CALCANEUS, INITIAL ENCOUNTER: Primary | ICD-10-CM

## 2024-05-09 DIAGNOSIS — G56.01 CARPAL TUNNEL SYNDROME OF RIGHT WRIST: ICD-10-CM

## 2024-05-09 NOTE — TELEPHONE ENCOUNTER
----- Message from Alley Saundra sent at 5/9/2024 10:30 AM CDT -----  Regarding: order  Patient called saying Dr. Jimenez only received an order for her carpal tunnel and not for her heel. From the message in her chart, she fractured her heel and wanted us to send an order for both to dr jimenez.  Is this possible?    -Alley

## 2024-05-30 ENCOUNTER — CLINICAL SUPPORT (OUTPATIENT)
Dept: LAB | Facility: HOSPITAL | Age: 64
End: 2024-05-30
Attending: ORTHOPAEDIC SURGERY
Payer: MEDICARE

## 2024-05-30 ENCOUNTER — HOSPITAL ENCOUNTER (OUTPATIENT)
Dept: RADIOLOGY | Facility: CLINIC | Age: 64
Discharge: HOME | End: 2024-05-30
Attending: ORTHOPAEDIC SURGERY
Payer: MEDICARE

## 2024-05-30 ENCOUNTER — OFFICE VISIT (OUTPATIENT)
Dept: ORTHOPEDICS | Facility: CLINIC | Age: 64
End: 2024-05-30
Payer: MEDICARE

## 2024-05-30 VITALS
WEIGHT: 110 LBS | SYSTOLIC BLOOD PRESSURE: 133 MMHG | BODY MASS INDEX: 20.24 KG/M2 | HEIGHT: 62 IN | HEART RATE: 77 BPM | DIASTOLIC BLOOD PRESSURE: 75 MMHG

## 2024-05-30 DIAGNOSIS — M65.341 TRIGGER FINGER, RIGHT RING FINGER: ICD-10-CM

## 2024-05-30 DIAGNOSIS — G56.01 RIGHT CARPAL TUNNEL SYNDROME: ICD-10-CM

## 2024-05-30 DIAGNOSIS — M65.331 TRIGGER FINGER, RIGHT MIDDLE FINGER: ICD-10-CM

## 2024-05-30 DIAGNOSIS — M25.531 RIGHT WRIST PAIN: ICD-10-CM

## 2024-05-30 DIAGNOSIS — Z01.818 PREOP TESTING: ICD-10-CM

## 2024-05-30 DIAGNOSIS — G56.01 CARPAL TUNNEL SYNDROME OF RIGHT WRIST: ICD-10-CM

## 2024-05-30 DIAGNOSIS — M25.531 RIGHT WRIST PAIN: Primary | ICD-10-CM

## 2024-05-30 PROCEDURE — 99204 OFFICE O/P NEW MOD 45 MIN: CPT | Mod: ,,, | Performed by: ORTHOPAEDIC SURGERY

## 2024-05-30 PROCEDURE — 93005 ELECTROCARDIOGRAM TRACING: CPT

## 2024-05-30 PROCEDURE — 73110 X-RAY EXAM OF WRIST: CPT | Mod: RT,,, | Performed by: ORTHOPAEDIC SURGERY

## 2024-05-30 PROCEDURE — 93010 ELECTROCARDIOGRAM REPORT: CPT | Mod: ,,, | Performed by: INTERNAL MEDICINE

## 2024-05-30 RX ORDER — SODIUM CHLORIDE, SODIUM GLUCONATE, SODIUM ACETATE, POTASSIUM CHLORIDE AND MAGNESIUM CHLORIDE 30; 37; 368; 526; 502 MG/100ML; MG/100ML; MG/100ML; MG/100ML; MG/100ML
INJECTION, SOLUTION INTRAVENOUS CONTINUOUS
Status: CANCELLED | OUTPATIENT
Start: 2024-05-30

## 2024-05-30 NOTE — PROGRESS NOTES
"Subjective:    CC: Wrist Pain (R wrist. Has started to notice locking in the index/middle/ring. Unable to fully close her hands. Has loss strength. Reports numbness/tingling. Radiating up to the elbow has tried a wrist brace with no relief. )       HPI:  Patient comes in today for her 1st visit.  Patient complains of right hand numbness and tingling as well as triggering of her fingers.  She states it has gotten in his really gotten worse over the last couple of years.  She has had a previous nerve conduction study demonstrating carpal tunnel.  She states the triggering has also worsened, she has had previous injections without relief.  She is also tried bracing without relief.  She is dropping things.  She is present here with family.    ROS: Refer to HPI for pertinent ROS. All other 12 point systems negative.    Objective:  Vitals:    05/30/24 1450   BP: 133/75   Pulse: 77   Weight: 49.9 kg (110 lb 0.2 oz)   Height: 5' 2" (1.575 m)        Physical Exam:  Patient is well-nourished developed female she is awake alert and oriented x3 she has in no apparent stress is pleasant and cooperative.  Examination of the right upper extremity compartment soft and warm.  Skin is intact.  There is no signs symptoms of DVT or infection.  She does have numbness and tingling throughout the median nerve distribution.  Questionable Tinel and Phalen's.  There was no obvious hypothenar or thenar wasting.  She is also having triggering of the 3rd and 4th finger, she is otherwise neurovascular intact distally.    Images:  X-rays three views right wrist demonstrate no obvious fracture dislocation. Images Reviewed and discussed with patient.    Assessment:  1. Right wrist pain  - X-Ray Wrist Complete Right; Future    2. Carpal tunnel syndrome of right wrist  - Ambulatory referral/consult to Orthopedics  - Place in Outpatient; Standing  - Vital signs; Standing  - Insert peripheral IV; Standing  - Clip and Prep Other (please specifiy) " (Operative site); Standing  - Cleanse with Chlorhexidine (CHG); Standing  - Diet NPO; Standing  - electrolyte-A infusion  - IP VTE LOW RISK PATIENT; Standing  - Place BRANDO hose; Standing  - Place sequential compression device; Standing  - ceFAZolin (ANCEF) 2 g in dextrose 5 % (D5W) 50 mL IVPB  - CBC auto differential; Future  - Comprehensive metabolic panel; Future  - EKG 12-lead; Future  - Inpatient consult to Anesthesiology; Standing  - Case Request Operating Room: RELEASE, CARPAL TUNNEL, RELEASE, TRIGGER FINGER; 3rd and 4th finger  - Full code; Standing    3. Right carpal tunnel syndrome    4. Trigger finger, right ring finger  - Place in Outpatient; Standing  - Vital signs; Standing  - Insert peripheral IV; Standing  - Clip and Prep Other (please specifiy) (Operative site); Standing  - Cleanse with Chlorhexidine (CHG); Standing  - Diet NPO; Standing  - electrolyte-A infusion  - IP VTE LOW RISK PATIENT; Standing  - Place BRANDO hose; Standing  - Place sequential compression device; Standing  - ceFAZolin (ANCEF) 2 g in dextrose 5 % (D5W) 50 mL IVPB  - CBC auto differential; Future  - Comprehensive metabolic panel; Future  - EKG 12-lead; Future  - Inpatient consult to Anesthesiology; Standing  - Case Request Operating Room: RELEASE, CARPAL TUNNEL, RELEASE, TRIGGER FINGER; 3rd and 4th finger  - Full code; Standing    5. Trigger finger, right middle finger  - Place in Outpatient; Standing  - Vital signs; Standing  - Insert peripheral IV; Standing  - Clip and Prep Other (please specifiy) (Operative site); Standing  - Cleanse with Chlorhexidine (CHG); Standing  - Diet NPO; Standing  - electrolyte-A infusion  - IP VTE LOW RISK PATIENT; Standing  - Place BRANDO hose; Standing  - Place sequential compression device; Standing  - ceFAZolin (ANCEF) 2 g in dextrose 5 % (D5W) 50 mL IVPB  - CBC auto differential; Future  - Comprehensive metabolic panel; Future  - EKG 12-lead; Future  - Inpatient consult to Anesthesiology; Standing  -  Case Request Operating Room: RELEASE, CARPAL TUNNEL, RELEASE, TRIGGER FINGER; 3rd and 4th finger  - Full code; Standing    6. Preop testing  - Place in Outpatient; Standing  - Vital signs; Standing  - Insert peripheral IV; Standing  - Clip and Prep Other (please specifiy) (Operative site); Standing  - Cleanse with Chlorhexidine (CHG); Standing  - Diet NPO; Standing  - electrolyte-A infusion  - IP VTE LOW RISK PATIENT; Standing  - Place BRANDO hose; Standing  - Place sequential compression device; Standing  - ceFAZolin (ANCEF) 2 g in dextrose 5 % (D5W) 50 mL IVPB  - CBC auto differential; Future  - Comprehensive metabolic panel; Future  - EKG 12-lead; Future  - Inpatient consult to Anesthesiology; Standing  - Case Request Operating Room: RELEASE, CARPAL TUNNEL, RELEASE, TRIGGER FINGER; 3rd and 4th finger  - Full code; Standing        Plan:  At this time we discussed her physical exam and x-ray findings.  We have discussed her previous nerve conduction study as well.  Patient has failed multiple conservative treatments for her carpal tunnel.  She is also having worsening triggering of the mainly 3rd and 4th fingers.  We have discussed conservative and surgical intervention.  The risks benefits and alternatives discussed with the patient in detail.  We have discussed the down time rehab process afterwards.  She would like to proceed with surgical intervention, we will set this up at her convenience.    Follow UP: No follow-ups on file.

## 2024-05-31 ENCOUNTER — ANESTHESIA EVENT (OUTPATIENT)
Dept: SURGERY | Facility: HOSPITAL | Age: 64
End: 2024-05-31
Payer: MEDICARE

## 2024-05-31 LAB
OHS QRS DURATION: 90 MS
OHS QTC CALCULATION: 430 MS

## 2024-06-04 ENCOUNTER — PATIENT MESSAGE (OUTPATIENT)
Dept: ADMINISTRATIVE | Facility: OTHER | Age: 64
End: 2024-06-04
Payer: MEDICARE

## 2024-06-04 RX ORDER — CALCIUM CARBONATE 600 MG
600 TABLET ORAL 2 TIMES DAILY WITH MEALS
COMMUNITY

## 2024-06-05 ENCOUNTER — TELEPHONE (OUTPATIENT)
Dept: NEUROSURGERY | Facility: CLINIC | Age: 64
End: 2024-06-05
Payer: MEDICARE
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
## 2024-06-06 ENCOUNTER — PATIENT MESSAGE (OUTPATIENT)
Dept: ADMINISTRATIVE | Facility: OTHER | Age: 64
End: 2024-06-06
Payer: MEDICARE

## 2024-06-06 NOTE — H&P
Admission History & Physical    Subjective:    CC: Wrist Pain (R wrist. Has started to notice locking in the index/middle/ring. Unable to fully close her hands. Has loss strength. Reports numbness/tingling. Radiating up to the elbow has tried a wrist brace with no relief. )       HPI:  Kaity Cuevas presents today for preoperative evaluation for Right carpal tunnel release; right hand 3rd and 4th trigger finger release. I reviewed the indications for surgery. The risks and benefits of the proposed and alternative treatments were discussed with the patient. Questions pertinent to the procedure were solicited and answered. Dr. Stark was available to answer any questions with instruction to call clinic with any further questions.No assurances were given. Informed consent was obtained. The patient expressed good understanding and wished to proceed with scheduling the procedure.     ROS:   Constitutional: No fever, weakness, or fatigue.   Ear/Nose/Mouth/Throat: No nasal congestion or sore throat.   Respiratory: No shortness of breath or cough.   Cardiovascular: No chest pain, palpitations, or peripheral edema.   Gastrointestinal: No nausea, vomiting, or abdominal pain.   Genitourinary: No dysuria.  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 Alert and cooperative.  HEENT: Normocephalic. PERRLA EOM intact.   Lungs: Breathing unlabored.  Heart: Regular rate and rhythm.  Abdomen: Soft, non-tender.  No rebound tenderness.  Extremities: Examination of the right upper extremity compartment soft and warm. Skin is intact. There is no signs symptoms of DVT or infection. She does have numbness and tingling throughout the median nerve distribution. Questionable Tinel and Phalen's. There was no obvious hypothenar or thenar wasting. She is also having triggering of the 3rd and 4th finger, she is otherwise neurovascular intact distally.   Skin: No rashes or open wounds.        Assessment:  1. Right wrist pain  - X-Ray Wrist Complete Right; Future    2. Carpal tunnel syndrome of right wrist  - Ambulatory referral/consult to Orthopedics  - Place in Outpatient; Standing  - Vital signs; Standing  - Insert peripheral IV; Standing  - Clip and Prep Other (please specifiy) (Operative site); Standing  - Cleanse with Chlorhexidine (CHG); Standing  - Diet NPO; Standing  - electrolyte-A infusion  - IP VTE LOW RISK PATIENT; Standing  - Place BRANDO hose; Standing  - Place sequential compression device; Standing  - ceFAZolin (ANCEF) 2 g in dextrose 5 % (D5W) 50 mL IVPB  - CBC auto differential; Future  - Comprehensive metabolic panel; Future  - EKG 12-lead; Future  - Inpatient consult to Anesthesiology; Standing  - Case Request Operating Room: RELEASE, CARPAL TUNNEL, RELEASE, TRIGGER FINGER; 3rd and 4th finger  - Full code; Standing    3. Right carpal tunnel syndrome    4. Trigger finger, right ring finger  - Place in Outpatient; Standing  - Vital signs; Standing  - Insert peripheral IV; Standing  - Clip and Prep Other (please specifiy) (Operative  site); Standing  - Cleanse with Chlorhexidine (CHG); Standing  - Diet NPO; Standing  - electrolyte-A infusion  - IP VTE LOW RISK PATIENT; Standing  - Place BRANDO hose; Standing  - Place sequential compression device; Standing  - ceFAZolin (ANCEF) 2 g in dextrose 5 % (D5W) 50 mL IVPB  - CBC auto differential; Future  - Comprehensive metabolic panel; Future  - EKG 12-lead; Future  - Inpatient consult to Anesthesiology; Standing  - Case Request Operating Room: RELEASE, CARPAL TUNNEL, RELEASE, TRIGGER FINGER; 3rd and 4th finger  - Full code; Standing    5. Trigger finger, right middle finger  - Place in Outpatient; Standing  - Vital signs; Standing  - Insert peripheral IV; Standing  - Clip and Prep Other (please specifiy) (Operative site); Standing  - Cleanse with Chlorhexidine (CHG); Standing  - Diet NPO; Standing  - electrolyte-A infusion  - IP VTE LOW RISK PATIENT; Standing  - Place BRANDO hose; Standing  - Place sequential compression device; Standing  - ceFAZolin (ANCEF) 2 g in dextrose 5 % (D5W) 50 mL IVPB  - CBC auto differential; Future  - Comprehensive metabolic panel; Future  - EKG 12-lead; Future  - Inpatient consult to Anesthesiology; Standing  - Case Request Operating Room: RELEASE, CARPAL TUNNEL, RELEASE, TRIGGER FINGER; 3rd and 4th finger  - Full code; Standing    6. Preop testing  - Place in Outpatient; Standing  - Vital signs; Standing  - Insert peripheral IV; Standing  - Clip and Prep Other (please specifiy) (Operative site); Standing  - Cleanse with Chlorhexidine (CHG); Standing  - Diet NPO; Standing  - electrolyte-A infusion  - IP VTE LOW RISK PATIENT; Standing  - Place BRANDO hose; Standing  - Place sequential compression device; Standing  - ceFAZolin (ANCEF) 2 g in dextrose 5 % (D5W) 50 mL IVPB  - CBC auto differential; Future  - Comprehensive metabolic panel; Future  - EKG 12-lead; Future  - Inpatient consult to Anesthesiology; Standing  - Case Request Operating Room: RELEASE, CARPAL TUNNEL, RELEASE,  TRIGGER FINGER; 3rd and 4th finger  - Full code; Standing       Plan:  Plan for Right carpal tunnel release; right hand 3rd and 4th trigger finger release at O. The patient has been given preoperative instructions. Post-operative appointment is scheduled for 2 weeks.

## 2024-06-07 ENCOUNTER — HOSPITAL ENCOUNTER (OUTPATIENT)
Facility: HOSPITAL | Age: 64
Discharge: HOME | End: 2024-06-07
Attending: ORTHOPAEDIC SURGERY | Admitting: ORTHOPAEDIC SURGERY
Payer: MEDICARE

## 2024-06-07 ENCOUNTER — ANESTHESIA (OUTPATIENT)
Dept: SURGERY | Facility: HOSPITAL | Age: 64
End: 2024-06-07
Payer: MEDICARE

## 2024-06-07 VITALS
TEMPERATURE: 98 F | WEIGHT: 109.81 LBS | HEART RATE: 66 BPM | BODY MASS INDEX: 19.46 KG/M2 | HEIGHT: 63 IN | RESPIRATION RATE: 20 BRPM | OXYGEN SATURATION: 100 % | DIASTOLIC BLOOD PRESSURE: 70 MMHG | SYSTOLIC BLOOD PRESSURE: 146 MMHG

## 2024-06-07 DIAGNOSIS — G56.01 CARPAL TUNNEL SYNDROME OF RIGHT WRIST: ICD-10-CM

## 2024-06-07 DIAGNOSIS — M65.341 TRIGGER FINGER, RIGHT RING FINGER: ICD-10-CM

## 2024-06-07 DIAGNOSIS — M65.331 TRIGGER FINGER, RIGHT MIDDLE FINGER: ICD-10-CM

## 2024-06-07 DIAGNOSIS — M25.531 RIGHT WRIST PAIN: ICD-10-CM

## 2024-06-07 DIAGNOSIS — G89.18 POST-OP PAIN: Primary | ICD-10-CM

## 2024-06-07 DIAGNOSIS — Z01.818 PREOP TESTING: ICD-10-CM

## 2024-06-07 PROCEDURE — 26055 INCISE FINGER TENDON SHEATH: CPT | Mod: 51,F7,F8, | Performed by: ORTHOPAEDIC SURGERY

## 2024-06-07 PROCEDURE — 25000003 PHARM REV CODE 250

## 2024-06-07 PROCEDURE — 63600175 PHARM REV CODE 636 W HCPCS: Performed by: ANESTHESIOLOGY

## 2024-06-07 PROCEDURE — 64721 CARPAL TUNNEL SURGERY: CPT | Mod: RT,,, | Performed by: ORTHOPAEDIC SURGERY

## 2024-06-07 PROCEDURE — 37000008 HC ANESTHESIA 1ST 15 MINUTES: Performed by: ORTHOPAEDIC SURGERY

## 2024-06-07 PROCEDURE — 37000009 HC ANESTHESIA EA ADD 15 MINS: Performed by: ORTHOPAEDIC SURGERY

## 2024-06-07 PROCEDURE — 71000015 HC POSTOP RECOV 1ST HR: Performed by: ORTHOPAEDIC SURGERY

## 2024-06-07 PROCEDURE — 25000003 PHARM REV CODE 250: Performed by: ORTHOPAEDIC SURGERY

## 2024-06-07 PROCEDURE — 63600175 PHARM REV CODE 636 W HCPCS

## 2024-06-07 PROCEDURE — 36000707: Performed by: ORTHOPAEDIC SURGERY

## 2024-06-07 PROCEDURE — 64415 NJX AA&/STRD BRCH PLXS IMG: CPT | Performed by: ANESTHESIOLOGY

## 2024-06-07 PROCEDURE — 63600175 PHARM REV CODE 636 W HCPCS: Performed by: ORTHOPAEDIC SURGERY

## 2024-06-07 PROCEDURE — 36000706: Performed by: ORTHOPAEDIC SURGERY

## 2024-06-07 RX ORDER — METHOCARBAMOL 500 MG/1
500 TABLET, FILM COATED ORAL EVERY 6 HOURS PRN
Status: DISCONTINUED | OUTPATIENT
Start: 2024-06-07 | End: 2024-06-07 | Stop reason: HOSPADM

## 2024-06-07 RX ORDER — CALCIUM CARBONATE 200(500)MG
500 TABLET,CHEWABLE ORAL 3 TIMES DAILY PRN
Status: DISCONTINUED | OUTPATIENT
Start: 2024-06-07 | End: 2024-06-07 | Stop reason: HOSPADM

## 2024-06-07 RX ORDER — SODIUM CHLORIDE, SODIUM GLUCONATE, SODIUM ACETATE, POTASSIUM CHLORIDE AND MAGNESIUM CHLORIDE 30; 37; 368; 526; 502 MG/100ML; MG/100ML; MG/100ML; MG/100ML; MG/100ML
INJECTION, SOLUTION INTRAVENOUS CONTINUOUS
OUTPATIENT
Start: 2024-06-07 | End: 2024-07-07

## 2024-06-07 RX ORDER — ROPIVACAINE HYDROCHLORIDE 5 MG/ML
INJECTION, SOLUTION EPIDURAL; INFILTRATION; PERINEURAL
Status: COMPLETED | OUTPATIENT
Start: 2024-06-07 | End: 2024-06-07

## 2024-06-07 RX ORDER — HYDROCODONE BITARTRATE AND ACETAMINOPHEN 5; 325 MG/1; MG/1
1 TABLET ORAL EVERY 4 HOURS PRN
Status: DISCONTINUED | OUTPATIENT
Start: 2024-06-07 | End: 2024-06-07 | Stop reason: HOSPADM

## 2024-06-07 RX ORDER — ROPIVACAINE HYDROCHLORIDE 5 MG/ML
INJECTION, SOLUTION EPIDURAL; INFILTRATION; PERINEURAL
Status: COMPLETED
Start: 2024-06-07 | End: 2024-06-07

## 2024-06-07 RX ORDER — HYDROCODONE BITARTRATE AND ACETAMINOPHEN 5; 325 MG/1; MG/1
1 TABLET ORAL EVERY 6 HOURS PRN
Qty: 12 TABLET | Refills: 0 | Status: SHIPPED | OUTPATIENT
Start: 2024-06-07

## 2024-06-07 RX ORDER — LIDOCAINE HYDROCHLORIDE 10 MG/ML
1 INJECTION, SOLUTION EPIDURAL; INFILTRATION; INTRACAUDAL; PERINEURAL ONCE
OUTPATIENT
Start: 2024-06-07 | End: 2024-06-07

## 2024-06-07 RX ORDER — SODIUM CHLORIDE 9 MG/ML
INJECTION, SOLUTION INTRAVENOUS CONTINUOUS
Status: DISCONTINUED | OUTPATIENT
Start: 2024-06-07 | End: 2024-06-07 | Stop reason: HOSPADM

## 2024-06-07 RX ORDER — ALUMINUM HYDROXIDE, MAGNESIUM HYDROXIDE, AND SIMETHICONE 1200; 120; 1200 MG/30ML; MG/30ML; MG/30ML
30 SUSPENSION ORAL EVERY 6 HOURS PRN
Status: DISCONTINUED | OUTPATIENT
Start: 2024-06-07 | End: 2024-06-07 | Stop reason: HOSPADM

## 2024-06-07 RX ORDER — LIDOCAINE HYDROCHLORIDE 20 MG/ML
INJECTION INTRAVENOUS
Status: DISCONTINUED | OUTPATIENT
Start: 2024-06-07 | End: 2024-06-07

## 2024-06-07 RX ORDER — METOCLOPRAMIDE HYDROCHLORIDE 5 MG/ML
10 INJECTION INTRAMUSCULAR; INTRAVENOUS EVERY 6 HOURS PRN
Status: DISCONTINUED | OUTPATIENT
Start: 2024-06-07 | End: 2024-06-07 | Stop reason: HOSPADM

## 2024-06-07 RX ORDER — ONDANSETRON HYDROCHLORIDE 2 MG/ML
4 INJECTION, SOLUTION INTRAVENOUS EVERY 6 HOURS PRN
Status: DISCONTINUED | OUTPATIENT
Start: 2024-06-07 | End: 2024-06-07 | Stop reason: HOSPADM

## 2024-06-07 RX ORDER — SODIUM CHLORIDE, SODIUM GLUCONATE, SODIUM ACETATE, POTASSIUM CHLORIDE AND MAGNESIUM CHLORIDE 30; 37; 368; 526; 502 MG/100ML; MG/100ML; MG/100ML; MG/100ML; MG/100ML
INJECTION, SOLUTION INTRAVENOUS CONTINUOUS
Status: DISCONTINUED | OUTPATIENT
Start: 2024-06-07 | End: 2024-06-07 | Stop reason: HOSPADM

## 2024-06-07 RX ORDER — SODIUM CITRATE AND CITRIC ACID MONOHYDRATE 334; 500 MG/5ML; MG/5ML
30 SOLUTION ORAL
OUTPATIENT
Start: 2024-06-07

## 2024-06-07 RX ORDER — MIDAZOLAM HYDROCHLORIDE 1 MG/ML
2 INJECTION INTRAMUSCULAR; INTRAVENOUS ONCE
Status: COMPLETED | OUTPATIENT
Start: 2024-06-07 | End: 2024-06-07

## 2024-06-07 RX ORDER — MORPHINE SULFATE 4 MG/ML
4 INJECTION, SOLUTION INTRAMUSCULAR; INTRAVENOUS
Status: DISCONTINUED | OUTPATIENT
Start: 2024-06-07 | End: 2024-06-07 | Stop reason: HOSPADM

## 2024-06-07 RX ORDER — PROPOFOL 10 MG/ML
VIAL (ML) INTRAVENOUS
Status: DISCONTINUED | OUTPATIENT
Start: 2024-06-07 | End: 2024-06-07

## 2024-06-07 RX ORDER — MIDAZOLAM HYDROCHLORIDE 2 MG/2ML
INJECTION, SOLUTION INTRAMUSCULAR; INTRAVENOUS
Status: DISCONTINUED
Start: 2024-06-07 | End: 2024-06-07 | Stop reason: HOSPADM

## 2024-06-07 RX ADMIN — MIDAZOLAM HYDROCHLORIDE 2 MG: 1 INJECTION, SOLUTION INTRAMUSCULAR; INTRAVENOUS at 09:06

## 2024-06-07 RX ADMIN — LIDOCAINE HYDROCHLORIDE 50 MG: 20 INJECTION INTRAVENOUS at 10:06

## 2024-06-07 RX ADMIN — PROPOFOL 50 MG: 10 INJECTION, EMULSION INTRAVENOUS at 10:06

## 2024-06-07 RX ADMIN — SODIUM CHLORIDE, SODIUM GLUCONATE, SODIUM ACETATE, POTASSIUM CHLORIDE AND MAGNESIUM CHLORIDE: 526; 502; 368; 37; 30 INJECTION, SOLUTION INTRAVENOUS at 10:06

## 2024-06-07 RX ADMIN — CEFAZOLIN 2 G: 2 INJECTION, POWDER, FOR SOLUTION INTRAMUSCULAR; INTRAVENOUS at 10:06

## 2024-06-07 RX ADMIN — ROPIVACAINE HYDROCHLORIDE 25 ML: 5 INJECTION, SOLUTION EPIDURAL; INFILTRATION; PERINEURAL at 10:06

## 2024-06-07 NOTE — DISCHARGE INSTRUCTIONS
[facility] [place]. [address]  [phone]    DIET    YOUR FIRST MEAL SHOULD BE LIQUID: I.E. SOUPS, JELLO, JUICE. IF YOUR LIQUID MEAL IS TOLERATED WELL THEN YOU MAY PROGRESS TO A SMALL LIGHT MEAL.   IF NAUSEA AND VOMITING OCCUR RETURN TO THE LIQUID DIET AND PROGRESS TO A NORMAL SOLID DIET SLOWLY.  IF THE NAUSEA AND VOMITING DOES NOT STOP, NOTIFY YOUR HEALTH CARE PROVIDER.      GENERAL ANESTHESIA OR SEDATION    DO NOT DRIVE OR PARTICIPATE IN ANY ACTIVITIES THAT REQUIRE COORDINATION FOR THE NEXT 24 HOURS: I.E. SWIMMING, BIKING, OPERATING HEAVY MACHINERY, COOKING, USING POWER TOOLS, CLIMBING LADDERS.   FOR THE NEXT 24 HOURS DO NOT: DRIVE, DRINK ALCOHOL, MAKE ANY IMPORTANT DECISIONS OR SIGN ANY LEGAL DOCUMENTS.   STAY WITH AN ADULT DURING THE 24 HOURS AFTER YOUR SURGERY.   DRINK ENOUGH FLUIDS TO KEEP YOUR URINE CLEAR TO PALE YELLOW.      PREVENTING CONSTIPATION AFTER SURGERY    EAT FOOD HIGH IN FIBER AND DRINK PLENTY OF FLUIDS, ESPECIALLY WATER.   YOU MAY TAKE AN OVER THE COUNTER FIBER SUPPLEMENT OR STOOL SOFTENER AS DIRECTED ON THE PACKAGE.   STAYING MOBILE, IF POSSIBLE, HELPS TO PREVENT CONSTIPATION.  CONTACT YOUR DOCTOR IF YOU HAVE NOT HAD A BOWEL MOVEMENT IN 3 DAYS AFTER SURGERY.       INFECTION CONTROL    KEEP YOUR DRESSING CLEAN, DRY AND INTACT.   FOLLOW PHYSICIAN INSTRUCTIONS REGARDING REMOVAL OF DRESSING.  DO NOT TOUCH SURGICAL SITE OR APPLY LOTIONS, POWDERS, CREAMS OR OINTMENTS ON YOUR INCISION UNLESS INSTRUCTED TO DO SO BY YOUR HEALTH CARE PROVIDER.  ONCE THE DRESSING HAS BEEN REMOVED, CHECK THE INCISION SITE DAILY FOR: INCREASED REDNESS, SWELLING, FLUID OR BLOOD, WARMTH, PUS, FOUL SMELL OR INCREASED PAIN.      DEEP VEIN THROMBOSIS (DVT)    A DVT IS A BLOOD CLOT THAT CAN DEVELOP IN THE DEEP AND LARGER VEINS OF THE LEG, ARM OR PELVIS.   RISK FACTORS INCLUDE SITTING OR LYING FOR LONG PERIODS OF TIME. THIS INCLUDES RECOVERING FROM SURGERY.  PREVENTION INCLUDES:  AVOID SITTING STILL FOR LONG PERIODS WITHOUT MOVING YOUR LEGS, DO NOT SMOKE AND IF POSSIBLE AVOID MEDICATIONS THAT CONTAIN ESTROGEN.   SIGNS AND SYMPTOMS THAT SHOULD BE REPORTED IMMEDIATELY INCLUDE: SWELLING IN AN ARM OR LEG, WARMTH, REDNESS OR PAIN IN ONE AREA OF THE LEG OR ARM THAT DOES NOT COME FROM THE INCISION. IF THE CLOT IS IN YOUR LEG, THE SYMPTOMS WILL BE WORSE WHEN STANDING OR WALKING.   SIGNS OF A PULMONARY EMBOLISM OR PE (A CLOT THAT MOVED TO YOUR LUNG): SHORTNESS OF BREATH, COUGHING , ESPECIALLY IF ACCOMPANIED WITH BLOODY MUCUS, CHEST PAIN OR RAPID HEART RATE.  IF YOU EXPERIENCE THESE SYMPTOMS, YOU SHOULD GET EMERGENCY TREATMENT RIGHT AWAY. DO NOT WAIT TO SEE IF THESE SYMPTOMS WILL GO AWAY. DO NOT DRIVE YOURSELF TO THE HOSPITAL.       CONTACT YOUR HEALTH CARE PROVIDER IF:    YOU HAVE REDNESS, SWELLING OR PAIN AROUND YOUR INCISION.  YOUR INCISION FEELS WARM TO THE TOUCH OR IS LEAKING EXCESSIVE FLUID/ BLOOD.  YOUR SUTURES OR STAPLES HAVE COME UNDONE.  YOU HAVE A TEMPERATURE .5 OR GREATER.  YOU ARE NOT ABLE TO URINATE WITHIN 6 HOURS AFTER SURGERY.  YOU HAVE UNRESOLVED NAUSEA AND VOMITING.       SEEK IMMEDIATE MEDICAL CARE IF:    YOU HAVE PERSISTENT NAUSEA AND VOMITING.   YOU ARE UNABLE TO EAT OR DRINK.   YOU HAVE DIFFICULTY SPEAKING AND/ OR BREATHING.  YOUR SKIN COLOR APPEARS BLUE OR GRAY.  YOU HAVE A RED STREAK COMING FROM YOUR INCISION.  YOUR INCISION BLEEDS THROUGH THE DRESSING AND DOES NOT STOP WITH GENTLY PRESSURE.  YOU HAVE SEVERE PAIN THAT DOES NOT DECREASE WITH YOUR MEDICATIONS.

## 2024-06-07 NOTE — PLAN OF CARE
Pt tolerated procedure well, ready for discharge  Problem: Adult Inpatient Plan of Care  Goal: Plan of Care Review  6/7/2024 1207 by Adair Hall RN  Outcome: Met  6/7/2024 1207 by Adair Hall RN  Outcome: Progressing  Goal: Patient-Specific Goal (Individualized)  6/7/2024 1207 by Adair Hall RN  Outcome: Met  6/7/2024 1207 by Adair Hall RN  Outcome: Progressing  Goal: Absence of Hospital-Acquired Illness or Injury  6/7/2024 1207 by Adair Hall RN  Outcome: Met  6/7/2024 1207 by Adair Hall RN  Outcome: Progressing  Goal: Optimal Comfort and Wellbeing  6/7/2024 1207 by Adair Hall RN  Outcome: Met  6/7/2024 1207 by Adair Hall RN  Outcome: Progressing  Goal: Readiness for Transition of Care  6/7/2024 1207 by Adair Hall RN  Outcome: Met  6/7/2024 1207 by Adair Hall RN  Outcome: Progressing     Problem: Wound  Goal: Optimal Coping  6/7/2024 1207 by Adair Hall RN  Outcome: Met  6/7/2024 1207 by Adair Hall RN  Outcome: Progressing  Goal: Optimal Functional Ability  6/7/2024 1207 by Adair Hall RN  Outcome: Met  6/7/2024 1207 by Adair Hall RN  Outcome: Progressing  Goal: Absence of Infection Signs and Symptoms  6/7/2024 1207 by Adair Hall, RN  Outcome: Met  6/7/2024 1207 by Adair Hall RN  Outcome: Progressing  Goal: Improved Oral Intake  6/7/2024 1207 by Adair Hall, RN  Outcome: Met  6/7/2024 1207 by Adair Hall RN  Outcome: Progressing  Goal: Optimal Pain Control and Function  6/7/2024 1207 by Adair Hall RN  Outcome: Met  6/7/2024 1207 by Adair Hall RN  Outcome: Progressing  Goal: Skin Health and Integrity  6/7/2024 1207 by Adair Hall RN  Outcome: Met  6/7/2024 1207 by Barras, Adair, RN  Outcome: Progressing  Goal: Optimal Wound Healing  6/7/2024 1207 by Adair Hall, RN  Outcome: Met  6/7/2024 1207 by Adair Hall, RN  Outcome: Progressing

## 2024-06-07 NOTE — TRANSFER OF CARE
"Anesthesia Transfer of Care Note    Patient: Kaity Cuevas    Procedure(s) Performed: Procedure(s) (LRB):  RELEASE, CARPAL TUNNEL (Right)  RELEASE, TRIGGER FINGER; 3rd and 4th finger (Right)    Patient location: OPS    Anesthesia Type: MAC    Transport from OR: Transported from OR on room air with adequate spontaneous ventilation    Post pain: adequate analgesia    Post assessment: no apparent anesthetic complications    Post vital signs: stable    Level of consciousness: awake    Nausea/Vomiting: no nausea/vomiting    Complications: none    Transfer of care protocol was followed    Last vitals: Visit Vitals  /74   Pulse 72   Temp 36.2 °C (97.2 °F)   Resp 20   Ht 5' 2.5" (1.588 m)   Wt 49.8 kg (109 lb 12.6 oz)   SpO2 100%   Breastfeeding No   BMI 19.76 kg/m²     "

## 2024-06-07 NOTE — BRIEF OP NOTE
[facility] - Periop Services  Brief Operative Note    Surgery Date: 6/7/2024     Surgeons and Role:     * Yuriy Stark MD - Primary    Assisting Surgeon: None    Pre-op Diagnosis:  Carpal tunnel syndrome of right wrist [G56.01]  Trigger finger, right ring finger [M65.341]  Trigger finger, right middle finger [M65.331]  Preop testing [Z01.818]    Post-op Diagnosis:  Post-Op Diagnosis Codes:     * Carpal tunnel syndrome of right wrist [G56.01]     * Trigger finger, right ring finger [M65.341]     * Trigger finger, right middle finger [M65.331]     * Preop testing [Z01.818]    Procedure(s) (LRB):  RELEASE, CARPAL TUNNEL (Right)  RELEASE, TRIGGER FINGER; 3rd and 4th finger (Right)    Anesthesia: General/Regional    Operative Findings: R cts, right 3/4 trigger    Estimated Blood Loss: * No values recorded between 6/7/2024 10:37 AM and 6/7/2024 10:57 AM *         Specimens:   Specimen (24h ago, onward)      None              Discharge Note    OUTCOME: Patient tolerated treatment/procedure well without complication and is now ready for discharge.    DISPOSITION: Home or Self Care    FINAL DIAGNOSIS:  Carpal tunnel syndrome of right wrist    FOLLOWUP: In clinic    DISCHARGE INSTRUCTIONS:    Discharge Procedure Orders   Diet general     Activity as tolerated     Keep surgical extremity elevated     Ice to affected area     Lifting restrictions     No driving, operating heavy equipment or signing legal documents while taking pain medication.     Other restrictions (specify):   Order Comments: Okay to wean sling as tolerated.     Remove dressing in 72 hours     Wound care routine (specify)   Order Comments: Wound care routine: keep dressing clean, dry, and intact. Ok to remove in 3 days. Ok to shower once removed. No submersion.     Call MD for:  temperature >100.4     Call MD for:  persistent nausea and vomiting     Call MD for:  severe uncontrolled pain     Call MD for:  difficulty breathing, headache  or visual disturbances     Call MD for:  redness, tenderness, or signs of infection (pain, swelling, redness, odor or green/yellow discharge around incision site)     Call MD for:  hives     Call MD for:  persistent dizziness or light-headedness     Call MD for:  extreme fatigue     Shower on day dressing removed (No bath)

## 2024-06-07 NOTE — ANESTHESIA POSTPROCEDURE EVALUATION
Anesthesia Post Evaluation    Patient: Kaity Cuevas    Procedure(s) Performed: Procedure(s) (LRB):  RELEASE, CARPAL TUNNEL (Right)  RELEASE, TRIGGER FINGER; 3rd and 4th finger (Right)    Final Anesthesia Type: general (/Regional//MAC)      Patient location during evaluation: PACU  Post-procedure mental status: @ basline.  Pain management: adequate    PONV status: See postop meds for drugs used to control n/v if any.  Anesthetic complications: no      Cardiovascular status: blood pressure returned to baseline  Respiratory status: @ baseline.  Hydration status: euvolemic                Vitals Value Taken Time   /70 06/07/24 1146   Temp 36.5 °C (97.7 °F) 06/07/24 1115   Pulse 65 06/07/24 1146   Resp 20 06/07/24 1145   SpO2 100 % 06/07/24 1146   Vitals shown include unfiled device data.      No case tracking events are documented in the log.      Pain/Thomas Score: Thomas Score: 10 (6/7/2024 11:05 AM)  Modified Thomas Score: 20 (6/7/2024 11:45 AM)

## 2024-06-07 NOTE — ANESTHESIA PREPROCEDURE EVALUATION
06/07/2024  Kaity Cuevas is a 63 y.o., female with medical problems noted in the EMR      Pre-op Assessment    I have reviewed the Patient Summary Reports.     I have reviewed the Nursing Notes. I have reviewed the NPO Status.   I have reviewed the Medications.     Review of Systems  Anesthesia Hx:  No problems with previous Anesthesia                Cardiovascular:  Exercise tolerance: good                                               Physical Exam  General: Well nourished and Cooperative    Airway:  Mallampati: II   Mouth Opening: Normal  TM Distance: Normal  Tongue: Normal  Neck ROM: Normal ROM    Chest/Lungs:  Clear to auscultation    Heart:  Rate: Normal        Anesthesia Plan  Type of Anesthesia, risks & benefits discussed:    Anesthesia Type: Gen Natural Airway  Intra-op Monitoring Plan: Standard ASA Monitors  Induction:  IV  Informed Consent: Informed consent signed with the Patient and all parties understand the risks and agree with anesthesia plan.  All questions answered.   ASA Score: 2  Day of Surgery Review of History & Physical: H&P Update referred to the surgeon/provider.    Ready For Surgery From Anesthesia Perspective.     .  I explained anesthesia plan to patient/responsbile party if available.  Anesthesia consent done going over the material facts, risks, complications & alternatives, obtained which includes the possibility of altering the anesthesia plan.  I reviewed problem list, prior to admission medication list, appropriate labs, any workup, Xray, EKG etc noted below.  Patients condition is satisfactory to proceed with anesthesia plan unless otherwise noted (see anesthesia chart for details of the anesthesia plan carried out).      Pre-operative evaluation for Procedure(s) (LRB):  RELEASE, CARPAL TUNNEL (Right)  RELEASE, TRIGGER FINGER; 3rd and 4th finger (Right)    /74    "Pulse 72   Temp 36.2 °C (97.2 °F)   Resp 20   Ht 5' 2.5" (1.588 m)   Wt 49.8 kg (109 lb 12.6 oz)   SpO2 100%   Breastfeeding No   BMI 19.76 kg/m²     There is no problem list on file for this patient.      Review of patient's allergies indicates:   Allergen Reactions    Codeine     Sulfa (sulfonamide antibiotics) Rash       Current Outpatient Medications   Medication Instructions    amLODIPine (NORVASC) 10 mg, Oral, Daily    aspirin (ECOTRIN) 81 mg, Oral, Daily    atorvastatin (LIPITOR) 10 mg, Oral, Nightly    biotin 5,000 mcg TbDL 1 tablet, Oral, Daily    calcium carbonate (OS-MATEO) 600 mg, Oral, 2 times daily with meals    ergocalciferol (ERGOCALCIFEROL) 50,000 Units, Oral, Every 6 weeks    erythromycin base (ERYTHROMYCIN OPHT) Ophthalmic, 3 times daily    estradioL (ESTRACE) 1 mg, Oral, Nightly    gabapentin (NEURONTIN) 300 MG capsule TAKE 1 CAPSULE BY MOUTH THREE TIMES A DAY    glycerin (CLEAR EYES DRY ITCHY RELIEF) 0.25 % Drop Ophthalmic, As needed (PRN)    ipratropium (ATROVENT) 21 mcg (0.03 %) nasal spray 2 sprays, Each Nostril, 3 times daily    losartan (COZAAR) 100 mg, Oral, Daily    metoprolol succinate (TOPROL-XL) 50 MG 24 hr tablet 1 tablet, Oral, Every morning    metoprolol succinate (TOPROL-XL) 100 mg, Oral, Nightly    PROLIA 60 mg, Subcutaneous, Every 6 months    terbinafine HCL (LAMISIL) 250 mg, Oral, Daily    vitamin K2 100 mcg Cap 1 capsule, Oral, Daily       Past Surgical History:   Procedure Laterality Date    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  07/26/2021    COLONOSCOPY      cyberknife for left petroclival tumor  04/20/2009    EYE SURGERY      HYSTERECTOMY  2009    Left C5-6, C6-7 posterior foraminotomies  08/20/2014    [facility]    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  06/04/2020    [facility]    removal of kidney stone  1995       Social History     Socioeconomic History    Marital status:    Tobacco Use    Smoking status: Never    Smokeless tobacco: Never " "  Substance and Sexual Activity    Alcohol use: Yes     Comment: rarely    Drug use: Never    Sexual activity: Yes   Social History Narrative    ** Merged History Encounter **            Lab Results   Component Value Date    WBC 6.08 05/30/2024    HGB 11.3 (L) 05/30/2024    HCT 34.3 (L) 05/30/2024    MCV 86.0 05/30/2024     05/30/2024          BMP  Lab Results   Component Value Date    HCT 34.3 (L) 05/30/2024     05/30/2024    K 4.0 05/30/2024    BUN 22.2 (H) 05/30/2024    CREATININE 0.95 05/30/2024    CALCIUM 10.2 05/30/2024        INR  No results for input(s): "PT", "INR", "PROTIME", "APTT" in the last 72 hours.        Diagnostic Studies:      EKG:  Results for orders placed or performed in visit on 05/30/24   EKG 12-lead    Collection Time: 05/30/24  4:05 PM   Result Value Ref Range    QRS Duration 90 ms    [facility] QTC Calculation 430 ms    Narrative    Test Reason : G56.01,M65.341,M65.331,Z01.818,    Vent. Rate : 065 BPM     Atrial Rate : 065 BPM     P-R Int : 194 ms          QRS Dur : 090 ms      QT Int : 414 ms       P-R-T Axes : 077 045 059 degrees     QTc Int : 430 ms    Normal sinus rhythm  Voltage criteria for left ventricular hypertrophy  Abnormal ECG  No previous ECGs available  Confirmed by Dustin Brady MD ([id number]) on 5/31/2024 10:08:12 AM    Referred By: LIGIA DELACRUZ           Confirmed By:Dustin Ribera MD           "

## 2024-06-07 NOTE — ANESTHESIA PROCEDURE NOTES
Peripheral Block Ax Block    Patient location during procedure: pre-op   Block not for primary anesthetic.  Reason for block: at surgeon's request and post-op pain management   Post-op Pain Location: R wrist carpal tunnel syndrome   Start time: 6/7/2024 9:58 AM  Timeout: 6/7/2024 9:58 AM   End time: 6/7/2024 10:03 AM    Staffing  Authorizing Provider: Sanjiv Ribera MD  Performing Provider: Sanjiv Ribera MD    Staffing  Performed by: Sanjiv Ribera MD  Authorized by: Sanjiv Ribera MD    Preanesthetic Checklist  Completed: patient identified, IV checked, site marked, risks and benefits discussed, surgical consent, monitors and equipment checked, pre-op evaluation and timeout performed  Peripheral Block  Patient position: supine  Prep: ChloraPrep and Mask worn, hand hygeine performed, sterile gloves worn  Patient monitoring: cardiac monitor, continuous pulse ox and frequent blood pressure checks (RN monitoring vitals throughout procedure.)  Block type: axillary  Laterality: right  Injection technique: single shot  Needle  Needle type: Stimuplex   Needle gauge: 21 G  Needle length: 2 in  Needle localization: anatomical landmarks and ultrasound guidance  Needle insertion depth: 3 cm   -ultrasound image captured on disc.  Assessment  Injection assessment: negative aspiration, negative parasthesia and local visualized surrounding nerve  Paresthesia pain: none  Heart rate change: no  Slow fractionated injection: yes  Pain Tolerance: comfortable throughout block  Medications:    Medications: ropivacaine (NAROPIN) injection 0.5% - Perineural   25 mL - 6/7/2024 10:02:00 AM    Additional Notes  Block..See EMR for any note re current/previous paresthesia, dysesthesias or chronic pain complaints in limb to be blocked.  US used to observe needle trajectory, needle placed in close proximity to appropriate nerve structures, they appeared anatomically normal.  Deposition of LA in close proximity to nerve  structures observed.  Perm image saved.  Stimulation of appropriate muscles noted @ 0.5ma with a + Hernando test.  No resistance to injection was encountered.  No paraesthesia or aspiration of blood observed.  No pain with injection.  See anesthesia chart for any drugs used to supplement the block. Sedation was provided at a level for patient to be able to communicate any discomfort.

## 2024-06-10 NOTE — OP NOTE
DATE OF SURGERY: 6/7/2024    SURGEON: Yuriy Stark MD      HOSPITAL:  [facility]    PREOPERATIVE DIAGNOSES: Carpal tunnel syndrome, right  wrist. Right hand trigger finger 3rd and 4th fingers    POSTOPERATIVE DIAGNOSES: Same as above    PROCEDURES: 1 Carpal tunnel release, right  wrist. 2 Right hand trigger finger release 3rd and 4th fingers    ANESTHESIA: Axillary block, IV sedation.    IV FLUIDS: As per Anesthesia.    ESTIMATED BLOOD LOSS: Less than 5 cc.    COUNTS: Correct.    COMPLICATIONS: None.    CONDITION: Stable to PACU.    INDICATIONS FOR PROCEDURE: Kaity Cuevas is a 63 y.o.  year old female who has been followed in my clinic.  The patient has severe carpal tunnel and has failed conservative treatment.  A nerve conduction study was performed demonstrating the above findings.  The risks, benefits, and alternatives were discussed with the patient in detail.  All questions were answered.  Informed consent was obtained.    PROCEDURE IN DETAIL: The patient was found in the preoperative holding by Anesthesia and found fit for surgery.  The patient was taken to the operating room and placed on the operating table in supine position.  All bony prominences were well padded.  Time-out was called to identify correct patient, correct procedure, correct site, all were in agreement.  The patient underwent IV sedation.  The patient was then prepped and draped in normal sterile fashion, leaving the right upper extremity exposed for surgery.  A well-padded tourniquet was placed on the right upper arm.  Approximate tourniquet time was 10 minutes at 250.  The patient received preoperative antibiotics.  After exsanguination of the affected upper extremity, tourniquet was inflated.  A 2.5 cm incision was made over the volar aspect of the right  wrist in line with the radial aspect of the fourth finger.  Soft tissue dissection was carried down to the transverse carpal ligament where it was then incised.  The median nerve was  completely flattened in the carpal tunnel.  After complete release of carpal tunnel, attention was turned to the 3rd and 4th fingers where separate 1 cm incisions were made over the A1 pulley of the 3rd and 4th fingers.  Soft tissue dissection was carefully taken down careful not to damage the neurovascular tendinous structures.  Next the 3rd and 4th A1 pulleys were then released.  After this done the finger was then flexed and extended, there was no triggering afterwards.  Tourniquet was released,  Hemostasis achieved.  Copious irrigation was used to wash the wounds.  The incisions were then closed with 3-0 nylon suture in standard fashion.  Xeroform, 4 x 4's, soft tissue dressing, Ace wrap, and a sling was placed on the affected upper extremity.  The patient was then awoken by Anesthesia and brought to PACU in stable condition.        ______________________________  Yuriy Stark MD

## 2024-06-13 ENCOUNTER — PATIENT MESSAGE (OUTPATIENT)
Dept: ADMINISTRATIVE | Facility: OTHER | Age: 64
End: 2024-06-13
Payer: MEDICARE

## 2024-06-13 NOTE — TELEPHONE ENCOUNTER
Patient called the office again in regards to the letter that Medicare is requesting. She stated that they are waiting for this information. I did advise her that a message has been sent to Cari but Cari is in clinic today so I am not sure when she will be able to get to it. She verbalized understanding and requested I put another message in.

## 2024-06-17 ENCOUNTER — TELEPHONE (OUTPATIENT)
Dept: ORTHOPEDICS | Facility: CLINIC | Age: 64
End: 2024-06-17
Payer: MEDICARE

## 2024-06-17 NOTE — TELEPHONE ENCOUNTER
Patient called to see if it was okay for her to move her fingers.     Attempted to call patient. Would have advised that it is okay for her to move her fingers but no heavy lifting.     Unable to leave .
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
## 2024-06-20 ENCOUNTER — OFFICE VISIT (OUTPATIENT)
Dept: ORTHOPEDICS | Facility: CLINIC | Age: 64
End: 2024-06-20
Payer: MEDICARE

## 2024-06-20 DIAGNOSIS — M65.341 TRIGGER FINGER, RIGHT RING FINGER: ICD-10-CM

## 2024-06-20 DIAGNOSIS — G56.01 CARPAL TUNNEL SYNDROME OF RIGHT WRIST: Primary | ICD-10-CM

## 2024-06-20 DIAGNOSIS — M65.331 TRIGGER FINGER, RIGHT MIDDLE FINGER: ICD-10-CM

## 2024-06-20 PROCEDURE — 99024 POSTOP FOLLOW-UP VISIT: CPT | Mod: POP,,,

## 2024-06-20 NOTE — PROGRESS NOTES
Subjective:    CC: Post-op Evaluation of the Right Wrist (PO R carpal tunel sx. Wrist is doing good. Hasn't had any pain that last couple of days. Of some pain in fingers. Not taking pain meds. )       HPI:  The patient returns to clinic for follow up of a right hand 3rd and 4th trigger finger release and carpal tunnel release on 06/07/2024.  Approximately 2 weeks out. The patient states no recent pain.  Not requiring any pain medication.  No signs of infection noted by the patient. The patient's complaints of numbness are resolved.  No recurrent triggering of the fingers. No New complaints.    ROS: Refer to HPI for pertinent ROS. All other 12 point systems negative.    Objective:    There were no vitals filed for this visit.     Physical Exam:  Right upper extremity compartments are soft and warm.  There are no signs or symptoms of DVT or infection. The patients incisions are well healed without drainage or fluctuance. Sutures have been removed and Steri-Strips applied. Able to make a full fist and has full extension of fingers.  No recurrent triggering or locking of any fingers finger. The patient is appropriately tender to palpation about incision site. Neurovascularly intact distally.    Images:  Previous Images Reviewed and discussed with patient.    Assessment:  1. Carpal tunnel syndrome of right wrist    2. Trigger finger, right ring finger    3. Trigger finger, right middle finger       Plan:  Physical exam and intraoperative findings discussed with the patient. Wound care instructions given.  Patient has done very well postoperatively. The Patient was instructed to take OTC pain medication as needed with appropriate precautions. I would like to see the patient back with any problems or difficulties.    Follow up: Follow up if symptoms worsen or fail to improve.

## 2024-07-11 DIAGNOSIS — M47.812 SPONDYLOSIS WITHOUT MYELOPATHY OR RADICULOPATHY, CERVICAL REGION: ICD-10-CM

## 2024-07-11 DIAGNOSIS — M47.812 CERVICAL SPONDYLOSIS WITHOUT MYELOPATHY: ICD-10-CM

## 2024-07-12 RX ORDER — GABAPENTIN 300 MG/1
CAPSULE ORAL
Qty: 90 CAPSULE | Refills: 2 | Status: SHIPPED | OUTPATIENT
Start: 2024-07-12

## 2024-07-18 ENCOUNTER — APPOINTMENT (OUTPATIENT)
Dept: RADIOLOGY | Facility: HOSPITAL | Age: 64
End: 2024-07-18
Attending: NEUROLOGICAL SURGERY
Payer: MEDICARE

## 2024-07-18 DIAGNOSIS — D32.9 BENIGN MENINGIOMA: ICD-10-CM

## 2024-07-18 PROCEDURE — 25500020 PHARM REV CODE 255: Performed by: NEUROLOGICAL SURGERY

## 2024-07-18 PROCEDURE — A9577 INJ MULTIHANCE: HCPCS | Performed by: NEUROLOGICAL SURGERY

## 2024-07-18 PROCEDURE — 70553 MRI BRAIN STEM W/O & W/DYE: CPT | Mod: TC

## 2024-07-18 RX ADMIN — GADOBENATE DIMEGLUMINE 9 ML: 529 INJECTION, SOLUTION INTRAVENOUS at 10:07

## 2024-08-05 ENCOUNTER — TELEPHONE (OUTPATIENT)
Dept: NEUROSURGERY | Facility: CLINIC | Age: 64
End: 2024-08-05
Payer: MEDICARE

## 2024-09-12 ENCOUNTER — OFFICE VISIT (OUTPATIENT)
Dept: NEUROSURGERY | Facility: CLINIC | Age: 64
End: 2024-09-12
Payer: MEDICARE

## 2024-09-12 VITALS
WEIGHT: 104.19 LBS | BODY MASS INDEX: 19.17 KG/M2 | RESPIRATION RATE: 16 BRPM | DIASTOLIC BLOOD PRESSURE: 68 MMHG | HEIGHT: 62 IN | SYSTOLIC BLOOD PRESSURE: 118 MMHG | HEART RATE: 101 BPM

## 2024-09-12 DIAGNOSIS — D32.9 BENIGN MENINGIOMA: Primary | ICD-10-CM

## 2024-09-12 DIAGNOSIS — M47.812 CERVICAL SPONDYLOSIS: ICD-10-CM

## 2024-09-12 PROCEDURE — 99214 OFFICE O/P EST MOD 30 MIN: CPT | Mod: ,,, | Performed by: STUDENT IN AN ORGANIZED HEALTH CARE EDUCATION/TRAINING PROGRAM

## 2024-09-12 RX ORDER — ASPIRIN 81 MG/1
1 TABLET ORAL DAILY
COMMUNITY

## 2024-09-12 RX ORDER — ERYTHROMYCIN 5 MG/G
OINTMENT OPHTHALMIC 4 TIMES DAILY
COMMUNITY
Start: 2024-08-12

## 2024-09-12 RX ORDER — FOLIC ACID 1 MG/1
1 TABLET ORAL DAILY
COMMUNITY

## 2024-09-12 NOTE — PROGRESS NOTES
[facility]  History & Physical  Neurosurgery      Kaity Cuevas   [id number]   1960       SUBJECTIVE:     CHIEF COMPLAINT:  Scheduled follow-up    HPI:  Kaity Cuevas is a 63 y.o. female who presents for follow up appointment.   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  She also has a history of left C5-6 and C6-7 posterior foraminotomies.  She underwent right carpal tunnel release earlier this year.  She was last seen in the office on December 20, 2023.  She reports that their neck is currently feeling good and they have no issues from their arms to their hands following carpal tunnel surgery. They mention that their fine motor skills have improved since the surgery, as they no longer have trouble opening jars. The patient acknowledges having had issues with their left hand after brain surgery in 2020, but physical therapy has helped. They have experienced balance issues since their diagnosis in 2009, with worsening in 2019 leading to the discovery of tumor growth and subsequent surgery. The patient denies any issues with their lower back or legs but reports a recent unexpected fall while doing laundry.  Surveillance MRI of her brain was recently completed.  She is here today to review results.    Past Medical History:   Diagnosis Date    Benign meningioma     Carpal tunnel syndrome on right     Cataract     Cervical disc displacement     Cervical spondylosis     Hard of hearing     Hyperlipidemia     Hypertension     Neoplasm, brain     Nephrolithiasis     Neurotrophic keratoconjunctivitis     Osteoporosis        Past Surgical History:   Procedure Laterality Date    BRAIN SURGERY  06/04/2020    CARPAL TUNNEL RELEASE Right 06/07/2024    Procedure: RELEASE, CARPAL TUNNEL;  Surgeon: Yuriy Stark MD;  Location: [facility];  Service: Orthopedics;  Laterality: Right;    CK to residual  left petroclival tumor  07/26/2021    COLONOSCOPY      cyberknife for left petroclival tumor  04/20/2009    EYE SURGERY      HYSTERECTOMY  2009    Left C5-6, C6-7 posterior foraminotomies  08/20/2014    [facility]    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  06/04/2020    [facility]    removal of kidney stone  1995    TRIGGER FINGER RELEASE Right 06/07/2024    Procedure: RELEASE, TRIGGER FINGER; 3rd and 4th finger;  Surgeon: Yuriy Stark MD;  Location: [facility] OR;  Service: Orthopedics;  Laterality: Right;       Family History   Problem Relation Name Age of Onset    Kidney disease Mother Maribel Auzenne     Hyperlipidemia Mother Maribel Auzenne     Hypertension Mother Maribel Auzenne     Arthritis Mother Maribel Auzenne     Heart disease Mother Maribel Auzenne     Stroke Father Stanley Auzenne     Kidney disease Father Stanley Auzenne     Skin cancer Father Stanley Auzenne     Hyperlipidemia Father Stanley Auzenne     Hypertension Father Stanley Auzenne     Cancer Father Stanley Auzenne     Thyroid cancer Sister      Hypertension Sister      Hypertension Brother Stanley Rogerszenne Jr     Diabetes Mellitus Paternal Grandmother Kassandra Auzenne     Diabetes Paternal Grandmother Kassandra Auzenne     Lung cancer Paternal Grandfather Scooter Auzenne     Heart disease Paternal Grandfather Scooter Auzenne     Cancer Sister Veronika Brayan     Hypertension Sister Veronika Bryaan     Miscarriages / Stillbirths Sister Veronika Brayan     Cancer Maternal Uncle Jonathan Kayy     Cancer Paternal Uncle Versjob Auzenne     Diabetes Paternal Aunt Candie Mandujano     Early death Paternal Aunt Mimi Duque     Hypertension Brother Sebastien Auzenne     Hypertension Sister Nataliia Simmons      Social History     Socioeconomic History    Marital status:    Tobacco Use    Smoking status: Never    Smokeless tobacco: Never   Substance and Sexual Activity    Alcohol use: Not Currently     Comment: rarely    Drug use: Never    Sexual activity: Yes     Partners:  "Male     Birth control/protection: See Surgical Hx   Social History Narrative    ** Merged History Encounter **            Review of patient's allergies indicates:   Allergen Reactions    Codeine     Sulfa (sulfonamide antibiotics) Rash and Other (See Comments)        Current Outpatient Medications   Medication Instructions    amLODIPine (NORVASC) 10 mg, Oral, Daily    aspirin (ECOTRIN) 81 MG EC tablet 1 tablet, Oral, Daily    atorvastatin (LIPITOR) 10 mg, Oral, Nightly    biotin 5,000 mcg TbDL 1 tablet, Oral, Daily    calcium carbonate (OS-MATEO) 600 mg, Oral, 2 times daily with meals    ergocalciferol (ERGOCALCIFEROL) 50,000 Units, Oral, Every 6 weeks    erythromycin (ROMYCIN) ophthalmic ointment Left Eye, 4 times daily    erythromycin base (ERYTHROMYCIN OPHT) Ophthalmic, 3 times daily    estradioL (ESTRACE) 1 mg, Oral, Nightly    folic acid (FOLVITE) 1 MG tablet 1 tablet, Oral, Daily    gabapentin (NEURONTIN) 300 MG capsule TAKE 1 CAPSULE BY MOUTH THREE TIMES A DAY    HYDROcodone-acetaminophen (NORCO) 5-325 mg per tablet 1 tablet, Oral, Every 6 hours PRN    ipratropium (ATROVENT) 21 mcg (0.03 %) nasal spray 2 sprays, Each Nostril, 3 times daily    losartan (COZAAR) 100 mg, Oral, Daily    metoprolol succinate (TOPROL-XL) 50 MG 24 hr tablet 1 tablet, Oral, Every morning    metoprolol succinate (TOPROL-XL) 100 mg, Oral, Nightly    PROLIA 60 mg, Subcutaneous, Every 6 months    vitamin K2 100 mcg Cap 1 capsule, Oral, Daily      Review of Systems   12 point review of systems conducted, negative except as stated in the history of present illness. See HPI for details.    OBJECTIVE:     Visit Vitals  /68   Pulse 101   Resp 16   Ht 5' 2" (1.575 m)   Wt 47.3 kg (104 lb 3.2 oz)   BMI 19.06 kg/m²     General:  Pleasant, Well-nourished, Well-groomed.    Lungs:  Breathing is quiet with non-labored respirations.    Neurological:    Alert and oriented to person, place, time, and situation.  Muscle strength against " resistance:  Strength  Deltoids Triceps Biceps Wrist Extension Wrist Flexion Intrinsics   Upper: R 5/5 5/5 5/5 5/5  5/5    L 5/5 5/5 5/5 5/5  5/5     Iliopsoas Quadriceps Knee  Flexion Tibialis  anterior Gastro- cnemius EHL   Lower: R 5/5 5/5 5/5 5/5 5/5 5/5    L 5/5 5/5 5/5 5/5 5/5 5/5   Sensation is intact to primary modalities in bilateral upper extremities.  Reflexes:   Right Left   Triceps 3+ 3+   Biceps 3+ 3+   Brachioradialis 3+ 3+   Patellar 3+ 3+   Achilles 2+ 2+   Mohr's sign is positive bilaterally.  Gait is normal.     Imaging:  All pertinent neuroimaging independently reviewed. Discussed these findings in detail with the patient.    MRI brain with and without contrast was completed on July 18, 2024.    Demonstrates stable appearance of the residual left CP a lesion.    Unchanged meningioma along posterior right frontal convexity.    Unchanged 9 mm right parasagittal likely cavernous malformation.    DIAGNOSES:       ICD-10-CM ICD-9-CM   1. Benign meningioma  D32.9 225.2   2. Cervical spondylosis  M47.812 721.0     ASSESSMENT/PLAN:     1. Meningioma:  - Stable residual tumor on MRI, treated with CyberKnife in 2021.  - Continue to follow with annual MRIs.    2. Cervical spine:  - No current neck issues reported.  - Schedule MRI of the cervical spine during the next visit to monitor for any changes.      Follow up in about 1 year (around 9/12/2025) for with MD, Follow-up, To review imaging.    Michael Lagos MD  Neurosurgery  [facility]    30 minutes were personally spent on this visit including my review of available records, prior imaging, comprehensive physical and neurologic examination and discussion with the patient.     Disclaimer:  This note is prepared using voice recognition software and as such is likely to have errors despite attempts at proofreading.

## 2024-11-07 ENCOUNTER — PATIENT MESSAGE (OUTPATIENT)
Dept: NEUROSURGERY | Facility: CLINIC | Age: 64
End: 2024-11-07
Payer: MEDICARE

## 2024-11-17 DIAGNOSIS — M47.812 SPONDYLOSIS WITHOUT MYELOPATHY OR RADICULOPATHY, CERVICAL REGION: ICD-10-CM

## 2024-11-17 DIAGNOSIS — M47.812 CERVICAL SPONDYLOSIS WITHOUT MYELOPATHY: ICD-10-CM

## 2024-11-18 RX ORDER — GABAPENTIN 300 MG/1
CAPSULE ORAL
Qty: 90 CAPSULE | Refills: 2 | Status: SHIPPED | OUTPATIENT
Start: 2024-11-18

## 2025-03-09 DIAGNOSIS — M47.812 SPONDYLOSIS WITHOUT MYELOPATHY OR RADICULOPATHY, CERVICAL REGION: ICD-10-CM

## 2025-03-09 DIAGNOSIS — M47.812 CERVICAL SPONDYLOSIS WITHOUT MYELOPATHY: ICD-10-CM

## 2025-03-10 RX ORDER — GABAPENTIN 300 MG/1
300 CAPSULE ORAL 3 TIMES DAILY
Qty: 90 CAPSULE | Refills: 2 | Status: SHIPPED | OUTPATIENT
Start: 2025-03-10

## 2025-06-09 ENCOUNTER — HOSPITAL ENCOUNTER (INPATIENT)
Facility: HOSPITAL | Age: 65
LOS: 11 days | Discharge: NURSING, REHAB OR LONG-TERM CARE | DRG: 054 | End: 2025-06-20
Attending: STUDENT IN AN ORGANIZED HEALTH CARE EDUCATION/TRAINING PROGRAM | Admitting: INTERNAL MEDICINE
Payer: MEDICARE

## 2025-06-09 DIAGNOSIS — I63.9 STROKE: ICD-10-CM

## 2025-06-09 DIAGNOSIS — R53.1 ACUTE LEFT-SIDED WEAKNESS: ICD-10-CM

## 2025-06-09 DIAGNOSIS — R07.9 CHEST PAIN: ICD-10-CM

## 2025-06-09 DIAGNOSIS — E43 SEVERE MALNUTRITION: Primary | ICD-10-CM

## 2025-06-09 LAB
ALBUMIN SERPL-MCNC: 4.2 G/DL (ref 3.4–4.8)
ALBUMIN/GLOB SERPL: 1 RATIO (ref 1.1–2)
ALP SERPL-CCNC: 85 UNIT/L (ref 40–150)
ALT SERPL-CCNC: 22 UNIT/L (ref 0–55)
ANION GAP SERPL CALC-SCNC: 13 MEQ/L
APTT PPP: 28.9 SECONDS (ref 23.2–33.7)
AST SERPL-CCNC: 26 UNIT/L (ref 11–45)
BACTERIA #/AREA URNS AUTO: ABNORMAL /HPF
BASOPHILS # BLD AUTO: 0.05 X10(3)/MCL
BASOPHILS NFR BLD AUTO: 0.5 %
BILIRUB SERPL-MCNC: 0.7 MG/DL
BILIRUB UR QL STRIP.AUTO: NEGATIVE
BUN SERPL-MCNC: 15.3 MG/DL (ref 9.8–20.1)
CALCIUM SERPL-MCNC: 10.4 MG/DL (ref 8.4–10.2)
CHLORIDE SERPL-SCNC: 101 MMOL/L (ref 98–107)
CHOLEST SERPL-MCNC: 190 MG/DL
CHOLEST/HDLC SERPL: 2 {RATIO} (ref 0–5)
CLARITY UR: CLEAR
CO2 SERPL-SCNC: 25 MMOL/L (ref 23–31)
COLOR UR AUTO: COLORLESS
CREAT SERPL-MCNC: 0.9 MG/DL (ref 0.55–1.02)
CREAT/UREA NIT SERPL: 17
EOSINOPHIL # BLD AUTO: 0.02 X10(3)/MCL (ref 0–0.9)
EOSINOPHIL NFR BLD AUTO: 0.2 %
ERYTHROCYTE [DISTWIDTH] IN BLOOD BY AUTOMATED COUNT: 13.2 % (ref 11.5–17)
GFR SERPLBLD CREATININE-BSD FMLA CKD-EPI: >60 ML/MIN/1.73/M2
GLOBULIN SER-MCNC: 4.2 GM/DL (ref 2.4–3.5)
GLUCOSE SERPL-MCNC: 110 MG/DL (ref 82–115)
GLUCOSE UR QL STRIP: NORMAL
HCT VFR BLD AUTO: 45 % (ref 37–47)
HDLC SERPL-MCNC: 85 MG/DL (ref 35–60)
HGB BLD-MCNC: 14.6 G/DL (ref 12–16)
HGB UR QL STRIP: NEGATIVE
IMM GRANULOCYTES # BLD AUTO: 0.04 X10(3)/MCL (ref 0–0.04)
IMM GRANULOCYTES NFR BLD AUTO: 0.4 %
INR PPP: 0.9
KETONES UR QL STRIP: NEGATIVE
LDLC SERPL CALC-MCNC: 90 MG/DL (ref 50–140)
LEUKOCYTE ESTERASE UR QL STRIP: 75
LYMPHOCYTES # BLD AUTO: 1.2 X10(3)/MCL (ref 0.6–4.6)
LYMPHOCYTES NFR BLD AUTO: 11 %
MCH RBC QN AUTO: 27.1 PG (ref 27–31)
MCHC RBC AUTO-ENTMCNC: 32.4 G/DL (ref 33–36)
MCV RBC AUTO: 83.5 FL (ref 80–94)
MONOCYTES # BLD AUTO: 0.92 X10(3)/MCL (ref 0.1–1.3)
MONOCYTES NFR BLD AUTO: 8.4 %
NEUTROPHILS # BLD AUTO: 8.69 X10(3)/MCL (ref 2.1–9.2)
NEUTROPHILS NFR BLD AUTO: 79.5 %
NITRITE UR QL STRIP: NEGATIVE
NRBC BLD AUTO-RTO: 0 %
PH UR STRIP: 7 [PH]
PLATELET # BLD AUTO: 314 X10(3)/MCL (ref 130–400)
PMV BLD AUTO: 9.8 FL (ref 7.4–10.4)
POCT GLUCOSE: 118 MG/DL (ref 70–110)
POTASSIUM SERPL-SCNC: 3.3 MMOL/L (ref 3.5–5.1)
PROT SERPL-MCNC: 8.4 GM/DL (ref 5.8–7.6)
PROT UR QL STRIP: NEGATIVE
PROTHROMBIN TIME: 12.1 SECONDS (ref 12.5–14.5)
RBC # BLD AUTO: 5.39 X10(6)/MCL (ref 4.2–5.4)
RBC #/AREA URNS AUTO: ABNORMAL /HPF
SODIUM SERPL-SCNC: 139 MMOL/L (ref 136–145)
SP GR UR STRIP.AUTO: 1.04 (ref 1–1.03)
SQUAMOUS #/AREA URNS LPF: ABNORMAL /HPF
TRIGL SERPL-MCNC: 73 MG/DL (ref 37–140)
TSH SERPL-ACNC: 3.19 UIU/ML (ref 0.35–4.94)
UROBILINOGEN UR STRIP-ACNC: NORMAL
VLDLC SERPL CALC-MCNC: 15 MG/DL
WBC # BLD AUTO: 10.92 X10(3)/MCL (ref 4.5–11.5)
WBC #/AREA URNS AUTO: ABNORMAL /HPF

## 2025-06-09 PROCEDURE — 11000001 HC ACUTE MED/SURG PRIVATE ROOM

## 2025-06-09 PROCEDURE — 80061 LIPID PANEL: CPT | Performed by: INTERNAL MEDICINE

## 2025-06-09 PROCEDURE — 21400001 HC TELEMETRY ROOM

## 2025-06-09 PROCEDURE — A9577 INJ MULTIHANCE: HCPCS | Performed by: INTERNAL MEDICINE

## 2025-06-09 PROCEDURE — 99223 1ST HOSP IP/OBS HIGH 75: CPT | Mod: FS,,, | Performed by: STUDENT IN AN ORGANIZED HEALTH CARE EDUCATION/TRAINING PROGRAM

## 2025-06-09 PROCEDURE — 82962 GLUCOSE BLOOD TEST: CPT

## 2025-06-09 PROCEDURE — 85610 PROTHROMBIN TIME: CPT | Performed by: NURSE PRACTITIONER

## 2025-06-09 PROCEDURE — 63600175 PHARM REV CODE 636 W HCPCS: Performed by: PHYSICIAN ASSISTANT

## 2025-06-09 PROCEDURE — 84443 ASSAY THYROID STIM HORMONE: CPT | Performed by: INTERNAL MEDICINE

## 2025-06-09 PROCEDURE — 25000003 PHARM REV CODE 250: Performed by: INTERNAL MEDICINE

## 2025-06-09 PROCEDURE — 85025 COMPLETE CBC W/AUTO DIFF WBC: CPT | Performed by: NURSE PRACTITIONER

## 2025-06-09 PROCEDURE — 80053 COMPREHEN METABOLIC PANEL: CPT | Performed by: NURSE PRACTITIONER

## 2025-06-09 PROCEDURE — 25500020 PHARM REV CODE 255: Performed by: STUDENT IN AN ORGANIZED HEALTH CARE EDUCATION/TRAINING PROGRAM

## 2025-06-09 PROCEDURE — 99285 EMERGENCY DEPT VISIT HI MDM: CPT | Mod: 25

## 2025-06-09 PROCEDURE — 81001 URINALYSIS AUTO W/SCOPE: CPT | Performed by: INTERNAL MEDICINE

## 2025-06-09 PROCEDURE — 85730 THROMBOPLASTIN TIME PARTIAL: CPT | Performed by: NURSE PRACTITIONER

## 2025-06-09 PROCEDURE — 63600175 PHARM REV CODE 636 W HCPCS: Performed by: INTERNAL MEDICINE

## 2025-06-09 PROCEDURE — 25500020 PHARM REV CODE 255: Performed by: INTERNAL MEDICINE

## 2025-06-09 PROCEDURE — 99223 1ST HOSP IP/OBS HIGH 75: CPT | Mod: ,,, | Performed by: SPECIALIST

## 2025-06-09 RX ORDER — LOSARTAN POTASSIUM 50 MG/1
100 TABLET ORAL DAILY
Status: DISCONTINUED | OUTPATIENT
Start: 2025-06-10 | End: 2025-06-14

## 2025-06-09 RX ORDER — BISACODYL 10 MG/1
10 SUPPOSITORY RECTAL DAILY PRN
Status: DISCONTINUED | OUTPATIENT
Start: 2025-06-09 | End: 2025-06-20 | Stop reason: HOSPADM

## 2025-06-09 RX ORDER — IOPAMIDOL 755 MG/ML
100 INJECTION, SOLUTION INTRAVASCULAR
Status: COMPLETED | OUTPATIENT
Start: 2025-06-09 | End: 2025-06-09

## 2025-06-09 RX ORDER — DILTIAZEM HYDROCHLORIDE 180 MG/1
180 CAPSULE, COATED, EXTENDED RELEASE ORAL DAILY
Status: DISCONTINUED | OUTPATIENT
Start: 2025-06-10 | End: 2025-06-19

## 2025-06-09 RX ORDER — VIBEGRON 75 MG/1
75 TABLET, FILM COATED ORAL DAILY
COMMUNITY

## 2025-06-09 RX ORDER — ERYTHROMYCIN 5 MG/G
OINTMENT OPHTHALMIC 4 TIMES DAILY
Status: DISCONTINUED | OUTPATIENT
Start: 2025-06-09 | End: 2025-06-19

## 2025-06-09 RX ORDER — DILTIAZEM HYDROCHLORIDE 180 MG/1
180 CAPSULE, COATED, EXTENDED RELEASE ORAL DAILY
COMMUNITY

## 2025-06-09 RX ORDER — LABETALOL HYDROCHLORIDE 5 MG/ML
20 INJECTION, SOLUTION INTRAVENOUS EVERY 4 HOURS PRN
Status: DISCONTINUED | OUTPATIENT
Start: 2025-06-09 | End: 2025-06-20 | Stop reason: HOSPADM

## 2025-06-09 RX ORDER — METOPROLOL SUCCINATE 50 MG/1
100 TABLET, EXTENDED RELEASE ORAL NIGHTLY
Status: DISCONTINUED | OUTPATIENT
Start: 2025-06-09 | End: 2025-06-12

## 2025-06-09 RX ORDER — ATORVASTATIN CALCIUM 40 MG/1
40 TABLET, FILM COATED ORAL DAILY
Status: DISCONTINUED | OUTPATIENT
Start: 2025-06-10 | End: 2025-06-14

## 2025-06-09 RX ORDER — DEXAMETHASONE SODIUM PHOSPHATE 4 MG/ML
4 INJECTION, SOLUTION INTRA-ARTICULAR; INTRALESIONAL; INTRAMUSCULAR; INTRAVENOUS; SOFT TISSUE EVERY 6 HOURS
Status: DISCONTINUED | OUTPATIENT
Start: 2025-06-10 | End: 2025-06-12

## 2025-06-09 RX ORDER — AMLODIPINE BESYLATE 5 MG/1
10 TABLET ORAL DAILY
Status: DISCONTINUED | OUTPATIENT
Start: 2025-06-10 | End: 2025-06-09

## 2025-06-09 RX ORDER — HYDRALAZINE HYDROCHLORIDE 20 MG/ML
10 INJECTION INTRAMUSCULAR; INTRAVENOUS EVERY 4 HOURS PRN
Status: DISCONTINUED | OUTPATIENT
Start: 2025-06-09 | End: 2025-06-20 | Stop reason: HOSPADM

## 2025-06-09 RX ORDER — TROSPIUM CHLORIDE 20 MG/1
20 TABLET, FILM COATED ORAL 2 TIMES DAILY
COMMUNITY

## 2025-06-09 RX ORDER — SODIUM CHLORIDE 0.9 % (FLUSH) 0.9 %
10 SYRINGE (ML) INJECTION
Status: DISCONTINUED | OUTPATIENT
Start: 2025-06-09 | End: 2025-06-20 | Stop reason: HOSPADM

## 2025-06-09 RX ORDER — GABAPENTIN 300 MG/1
300 CAPSULE ORAL 3 TIMES DAILY
Status: DISCONTINUED | OUTPATIENT
Start: 2025-06-09 | End: 2025-06-14

## 2025-06-09 RX ORDER — DEXAMETHASONE SODIUM PHOSPHATE 4 MG/ML
2 INJECTION, SOLUTION INTRA-ARTICULAR; INTRALESIONAL; INTRAMUSCULAR; INTRAVENOUS; SOFT TISSUE EVERY 8 HOURS
Status: DISCONTINUED | OUTPATIENT
Start: 2025-06-09 | End: 2025-06-09

## 2025-06-09 RX ORDER — LABETALOL HYDROCHLORIDE 5 MG/ML
10 INJECTION, SOLUTION INTRAVENOUS
Status: DISCONTINUED | OUTPATIENT
Start: 2025-06-09 | End: 2025-06-09

## 2025-06-09 RX ADMIN — IOPAMIDOL 100 ML: 755 INJECTION, SOLUTION INTRAVENOUS at 11:06

## 2025-06-09 RX ADMIN — GADOBENATE DIMEGLUMINE 9 ML: 529 INJECTION, SOLUTION INTRAVENOUS at 01:06

## 2025-06-09 RX ADMIN — DEXAMETHASONE SODIUM PHOSPHATE 4 MG: 4 INJECTION, SOLUTION INTRA-ARTICULAR; INTRALESIONAL; INTRAMUSCULAR; INTRAVENOUS; SOFT TISSUE at 11:06

## 2025-06-09 RX ADMIN — DEXAMETHASONE SODIUM PHOSPHATE 2 MG: 4 INJECTION, SOLUTION INTRA-ARTICULAR; INTRALESIONAL; INTRAMUSCULAR; INTRAVENOUS; SOFT TISSUE at 03:06

## 2025-06-09 RX ADMIN — ERYTHROMYCIN: 5 OINTMENT OPHTHALMIC at 08:06

## 2025-06-09 RX ADMIN — LABETALOL HYDROCHLORIDE 20 MG: 5 INJECTION, SOLUTION INTRAVENOUS at 11:06

## 2025-06-09 NOTE — CONSULTS
[facility] - Emergency Dept  Neurology  Consult Note    Patient Name: Kaity Cuevas  MRN: [id number]  Admission Date: 6/9/2025  Hospital Length of Stay: 0 days  Code Status: Prior   Attending Provider: Marcelino Benedict MD   Consulting Provider: Carol Son NP  Primary Care Physician: Tabby Parker MD  Principal Problem:<principal problem not specified>    Inpatient consult to Neurology Services (Vascular Neurology)  Consult performed by: Carol Son NP  Consult ordered by: Ramya Marti PA         Subjective:     Chief Complaint:    Chief Complaint   Patient presents with    Facial Droop     Difficulty speaking and walking starting yesterday evening at unknown time. L sided facial droop baseline s/t brain tumor but more severe since yesterday as well. Headache since Thursday.  VAN negative.           HPI:   Kaity Cuevas is a 64 y.o. female with past medical history of hyperlipidemia, hypertension, and benign meningioma of the brain who presented to [facility] on 6/9/2025 with left sided weakness and left facial droop. Per chart, she also reported having trouble eating, swallowing, and felt as though the room was spinning, when attempting to walk.  report left side damage to facial nerve, but he thinks her left facial droop is worse. He also hoted that she was dragging her left leg. Patient is known to Dr. Lagos as she is status post near total resection of left petroclivical meningioma and cyber knife for residual tumor. 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 CTA head and neck without LVO or flow limiting stenosis. MRI brain w/wo pending. Neurosurgery following.        Past Medical History:   Diagnosis Date    Benign meningioma     Carpal tunnel syndrome on right     Cataract      Cervical disc displacement     Cervical spondylosis     Hard of hearing     Hyperlipidemia     Hypertension     Neoplasm, brain     Nephrolithiasis     Neurotrophic keratoconjunctivitis     Osteoporosis        Past Surgical History:   Procedure Laterality Date    BRAIN SURGERY  06/04/2020    CARPAL TUNNEL RELEASE Right 06/07/2024    Procedure: RELEASE, CARPAL TUNNEL;  Surgeon: Yuriy Stakr MD;  Location: [facility] OR;  Service: Orthopedics;  Laterality: Right;    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  07/26/2021    COLONOSCOPY      cyberknife for left petroclival tumor  04/20/2009    EYE SURGERY      HYSTERECTOMY  2009    Left C5-6, C6-7 posterior foraminotomies  08/20/2014    [facility]    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  06/04/2020    Day    removal of kidney stone  1995    TRIGGER FINGER RELEASE Right 06/07/2024    Procedure: RELEASE, TRIGGER FINGER; 3rd and 4th finger;  Surgeon: Yuriy Stark MD;  Location: [facility] OR;  Service: Orthopedics;  Laterality: Right;       Review of patient's allergies indicates:   Allergen Reactions    Codeine     Sulfa (sulfonamide antibiotics) Rash and Other (See Comments)       Current Neurological Medications:     No current facility-administered medications on file prior to encounter.     Current Outpatient Medications on File Prior to Encounter   Medication Sig    amLODIPine (NORVASC) 10 MG tablet Take 10 mg by mouth once daily.    aspirin (ECOTRIN) 81 MG EC tablet Take 1 tablet by mouth once daily.    atorvastatin (LIPITOR) 10 MG tablet Take 10 mg by mouth every evening.    biotin 5,000 mcg TbDL Take 1 tablet by mouth Daily.    calcium carbonate (OS-MATEO) 600 mg calcium (1,500 mg) Tab Take 600 mg by mouth 2 (two) times daily with meals.    denosumab (PROLIA) 60 mg/mL Syrg Inject 60 mg into the skin every 6 (six) months.    ergocalciferol (ERGOCALCIFEROL) 50,000 unit Cap Take 50,000 Units by mouth every 6 weeks.    erythromycin (ROMYCIN) ophthalmic ointment  Place into the left eye 4 (four) times daily.    erythromycin base (ERYTHROMYCIN OPHT) Apply to eye 3 (three) times daily.    estradioL (ESTRACE) 1 MG tablet Take 1 mg by mouth nightly.    folic acid (FOLVITE) 1 MG tablet Take 1 tablet by mouth once daily.    gabapentin (NEURONTIN) 300 MG capsule TAKE 1 CAPSULE BY MOUTH THREE TIMES A DAY    HYDROcodone-acetaminophen (NORCO) 5-325 mg per tablet Take 1 tablet by mouth every 6 (six) hours as needed for Pain. (Patient not taking: Reported on 9/12/2024)    ipratropium (ATROVENT) 21 mcg (0.03 %) nasal spray 2 sprays by Each Nostril route 3 (three) times daily.    losartan (COZAAR) 100 MG tablet Take 100 mg by mouth once daily.    metoprolol succinate (TOPROL-XL) 100 MG 24 hr tablet Take 100 mg by mouth nightly.    metoprolol succinate (TOPROL-XL) 50 MG 24 hr tablet Take 1 tablet by mouth every morning.    vitamin K2 100 mcg Cap Take 1 capsule by mouth Daily.     Family History       Problem Relation (Age of Onset)    Arthritis Mother    Cancer Father, Sister, Maternal Uncle, Paternal Uncle    Diabetes Paternal Grandmother, Paternal Aunt    Diabetes Mellitus Paternal Grandmother    Early death Paternal Aunt    Heart disease Mother, Paternal Grandfather    Hyperlipidemia Mother, Father    Hypertension Mother, Father, Sister, Brother, Sister, Brother, Sister    Kidney disease Mother, Father    Lung cancer Paternal Grandfather    Miscarriages / Stillbirths Sister    Skin cancer Father    Stroke Father    Thyroid cancer Sister          Tobacco Use    Smoking status: Never    Smokeless tobacco: Never   Substance and Sexual Activity    Alcohol use: Not Currently     Comment: rarely    Drug use: Never    Sexual activity: Yes     Partners: Male     Birth control/protection: See Surgical Hx     Review of Systems   All other systems reviewed and are negative.    Objective:     Vital Signs (Most Recent):  Temp: 97.5 °F (36.4 °C) (06/09/25 1017)  Pulse: 78 (06/09/25 1112)  Resp: 13  (06/09/25 1112)  BP: (!) 138/115 (06/09/25 1112)  SpO2: 99 % (06/09/25 1112) Vital Signs (24h Range):  Temp:  [97.5 °F (36.4 °C)] 97.5 °F (36.4 °C)  Pulse:  [78-79] 78  Resp:  [13-18] 13  SpO2:  [99 %] 99 %  BP: (138-177)/() 138/115     Weight: 45.3 kg (99 lb 13.9 oz)  Body mass index is 18.27 kg/m².     Physical Exam  Vitals reviewed.   HENT:      Head: Normocephalic and atraumatic.      Nose: Nose normal.      Mouth/Throat:      Mouth: Mucous membranes are moist.   Eyes:      Pupils: Pupils are equal, round, and reactive to light.   Pulmonary:      Effort: Pulmonary effort is normal.   Musculoskeletal:         General: Normal range of motion.      Cervical back: Normal range of motion and neck supple.   Skin:     General: Skin is warm and dry.      Capillary Refill: Capillary refill takes less than 2 seconds.   Neurological:      Mental Status: She is alert and oriented to person, place, and time.      Comments: Left facial droop  Sensation intact bilaterally  VFF  Antigravity in all extremities            NEUROLOGICAL EXAMINATION:     MENTAL STATUS   Oriented to person, place, and time.     CRANIAL NERVES     CN III, IV, VI   Pupils are equal, round, and reactive to light.  CNVIII left ear hearing impaired    Significant Labs: All pertinent lab results from the past 24 hours have been reviewed.    Significant Imaging: I have reviewed all pertinent imaging results/findings within the past 24 hours.  Assessment and Plan:     Stroke  Rule out stroke  Presented with worsening left facial droop, LLE weakness  Etiology: CVA vs Increase Tumor [place]    Plan:  Low suspicion for acute stroke, suspect increased tumor burden. MRI brain w/wo pending.  If MRI brain shows acute stroke, will order complete workup.    Further recommendations per MD.            VTE Risk Mitigation (From admission, onward)      None            Thank you for your consult. Will follow up with patient.    Carol Son,  NP  Neurology  [facility] - Emergency Dept

## 2025-06-09 NOTE — HPI
Kaity Cuevas is a 64 y.o. female with past medical history of hyperlipidemia, hypertension, and benign meningioma of the brain who presented to [facility] on 6/9/2025 with left sided weakness and left facial droop. Per chart, she also reported having trouble eating, swallowing, and felt as though the room was spinning, when attempting to walk.  report left side damage to facial nerve, but he thinks her left facial droop is worse. He also hoted that she was dragging her left leg. Patient is known to Dr. Lagos as she is status post near total resection of left petroclivical meningioma and cyber knife for residual tumor. 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 CTA head and neck without LVO or flow limiting stenosis. MRI brain w/wo pending. Neurosurgery following.

## 2025-06-09 NOTE — ASSESSMENT & PLAN NOTE
Rule out stroke  Presented with worsening left facial droop, LLE weakness  Etiology: CVA vs Increase Tumor [place]    Plan:  Low suspicion for acute stroke, suspect increased tumor burden. MRI brain w/wo pending.  If MRI brain shows acute stroke, will order complete workup.    Further recommendations per MD.

## 2025-06-09 NOTE — CONSULTS
[facility] - Emergency Dept  Neurosurgery  Consult Note    Inpatient consult to Neurosurgery  Consult performed by: Ramya Marti PA  Consult ordered by: Sterling Milian MD        Subjective:     Chief Complaint/Reason for Admission: CP mass    History of Present Illness: Patient is a 64 year old female with a history of benign meningioma (resected in 2020), HLD, HTN, neoplasm brain, and nephrolithiasis, who presented to the ED with left sided weakness. Patient's  states she had a brain tumor causing damage to her left sided facial nerves, but the weakness has worsened since last night. Patient states she was having trouble eating and swallowing and when she tried to walk she felt the room was spinning. Patient's  states he noticed this morning the patient was dragging her left leg as she walked. Patient also complains of headaches, nausea, and vomiting since Thursday. Patient denies any changes in her bowel movements and urination.     CT brain:  Loss of gray-white differentiation at the right frontal lobe (best seen image 30, series 2).  Findings suspicious for acute infarct.  No evidence of hemorrhagic transformation.   2. No evidence of intraparenchymal hemorrhage.   3. Findings most consistent with interval enlargement of left CP angle mass since 07/18/2024.  Findings on MRI from 07/19/2024 suggested underlying meningioma.     Dr. Lagos was consulted for evaluation and treatment recommendations.    On PE today she is lying in bed, NAD. She denies HA. She states that on Thursday night she began feeling ill. She spent most of the day Friday and Saturday d/t lack of energy. She began vomiting Sat night and all day yesterday. Sunday afternoon she found it difficult to swallow, spitting up her water. Her  noticed that she was dragging the right leg. This morning she woke up and he felt that her facial droop was worse, prompting her visit to the ED. She denies numbness  and tingling in all ext. She denies saddle anesthesia and bladder and bowel dysfunction.    Review of patient's allergies indicates:   Allergen Reactions    Codeine     Sulfa (sulfonamide antibiotics) Rash and Other (See Comments)       Past Medical History:   Diagnosis Date    Benign meningioma     Carpal tunnel syndrome on right     Cataract     Cervical disc displacement     Cervical spondylosis     Hard of hearing     Hyperlipidemia     Hypertension     Neoplasm, brain     Nephrolithiasis     Neurotrophic keratoconjunctivitis     Osteoporosis      Past Surgical History:   Procedure Laterality Date    BRAIN SURGERY  06/04/2020    CARPAL TUNNEL RELEASE Right 06/07/2024    Procedure: RELEASE, CARPAL TUNNEL;  Surgeon: Yuriy Stark MD;  Location: [facility] OR;  Service: Orthopedics;  Laterality: Right;    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  07/26/2021    COLONOSCOPY      cyberknife for left petroclival tumor  04/20/2009    EYE SURGERY      HYSTERECTOMY  2009    Left C5-6, C6-7 posterior foraminotomies  08/20/2014    [facility]    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  06/04/2020    [facility]    removal of kidney stone  1995    TRIGGER FINGER RELEASE Right 06/07/2024    Procedure: RELEASE, TRIGGER FINGER; 3rd and 4th finger;  Surgeon: Yuriy Stark MD;  Location: [facility] OR;  Service: Orthopedics;  Laterality: Right;     Family History       Problem Relation (Age of Onset)    Arthritis Mother    Cancer Father, Sister, Maternal Uncle, Paternal Uncle    Diabetes Paternal Grandmother, Paternal Aunt    Diabetes Mellitus Paternal Grandmother    Early death Paternal Aunt    Heart disease Mother, Paternal Grandfather    Hyperlipidemia Mother, Father    Hypertension Mother, Father, Sister, Brother, Sister, Brother, Sister    Kidney disease Mother, Father    Lung cancer Paternal Grandfather    Miscarriages / Stillbirths Sister    Skin cancer Father    Stroke Father    Thyroid cancer Sister          Tobacco Use     Smoking status: Never    Smokeless tobacco: Never   Substance and Sexual Activity    Alcohol use: Not Currently     Comment: rarely    Drug use: Never    Sexual activity: Yes     Partners: Male     Birth control/protection: See Surgical Hx     Review of Systems  Objective:     Weight: 45.3 kg (99 lb 13.9 oz)  Body mass index is 18.27 kg/m².  Vital Signs (Most Recent):  Temp: 97.5 °F (36.4 °C) (06/09/25 1017)  Pulse: 78 (06/09/25 1112)  Resp: 13 (06/09/25 1112)  BP: (!) 138/115 (06/09/25 1112)  SpO2: 99 % (06/09/25 1112) Vital Signs (24h Range):  Temp:  [97.5 °F (36.4 °C)] 97.5 °F (36.4 °C)  Pulse:  [78-79] 78  Resp:  [13-18] 13  SpO2:  [99 %] 99 %  BP: (138-177)/() 138/115     Neurosurgery Physical Exam  AFVSS  PERRL, EOMI  Difficulty closing the left eye; increased tearing noted  Alert, oriented to all spheres. Speech clear.  Left facial droop. Decreased sensation to the left side of her face, chronic  4/5 Left UE. 5-/5 left LE  5/5 right UE and LE    Significant Labs:  Recent Labs   Lab 06/09/25  1033         K 3.3*      CO2 25   BUN 15.3   CREATININE 0.90   CALCIUM 10.4*     Recent Labs   Lab 06/09/25  1033   WBC 10.92   HGB 14.6   HCT 45.0        Recent Labs   Lab 06/09/25  1033   INR 0.9   APTT 28.9     Microbiology Results (last 7 days)       ** No results found for the last 168 hours. **          Significant Diagnostics:  CT Head Without Contrast [[id number]] Resulted: 06/09/25 1152   Order Status: Completed Updated: 06/09/25 1155   Narrative:     EXAMINATION:  CT HEAD WITHOUT CONTRAST    INDICATION:  Neuro deficit, acute, stroke suspected;Facial Droop (Difficulty speaking and walking starting yesterday evening at unknown time. L sided facial droop baseline s/t brain tumor but more severe since yesterday as well. Headache since Thursday.  VAN negative. )    TECHNIQUE:  Contiguous axial images are acquired through the head without IV contrast.  These images were  reconstructed into the coronal and sagittal plane.  Automated exposure control, dose radiation lowering technique was utilized.    COMPARISON:  Brain MRI from 07/18/2024    FINDINGS:  Patient status post left frontotemporal craniotomy.  Clear mastoid air cells.  Left temporal encephalomalacia is present deep to the postsurgical changes.  Findings are similar compared to 07/18/2024 when allowing for differences in technique.    There is a geographic area of decreased density at the right frontal lobe with loss of gray-white differentiation.  No extra-axial fluid collection, contusion or hemorrhage.  No midline shift.  Patent basilar cisterns.  No hyperdense vessel sign.    Partially calcified right frontal convexity, presumed meningioma measures 1.2 x 0.8 cm, similar in size since 07/18/2024.    Left CP angle mass with erosion at the petrous apex measures approximately 3.7 by 2.5 cm, larger since 07/18/2024, previously measuring 1.5 x 0.6 cm.   Impression:       1. Loss of gray-white differentiation at the right frontal lobe (best seen image 30, series 2).  Findings suspicious for acute infarct.  No evidence of hemorrhagic transformation.  2. No evidence of intraparenchymal hemorrhage.  3. Findings most consistent with interval enlargement of left CP angle mass since 07/18/2024.  Findings on MRI from 07/19/2024 suggested underlying meningioma.       Assessment/Plan:     Active Diagnoses:    Diagnosis Date Noted POA    Stroke [I63.9] 06/09/2025 Yes      Problems Resolved During this Admission:     She presents with worsening left facial droop, left sided weakness  CT significant for interval enlargement of left CP mass. Also with possible infarct to right frontal lobe.  We have ordered an MRI to further assess  Will hold off on steroids pending MRI  Further recs to follow per Dr. Lagos once imaging is reviewed    Thank you for your consult. I will follow-up with patient. Please contact us if you have any additional  questions.    ADILSON Mckeon  Neurosurgery  [facility] - Emergency Dept

## 2025-06-09 NOTE — SUBJECTIVE & OBJECTIVE
Past Medical History:   Diagnosis Date    Benign meningioma     Carpal tunnel syndrome on right     Cataract     Cervical disc displacement     Cervical spondylosis     Hard of hearing     Hyperlipidemia     Hypertension     Neoplasm, brain     Nephrolithiasis     Neurotrophic keratoconjunctivitis     Osteoporosis        Past Surgical History:   Procedure Laterality Date    BRAIN SURGERY  06/04/2020    CARPAL TUNNEL RELEASE Right 06/07/2024    Procedure: RELEASE, CARPAL TUNNEL;  Surgeon: Yuriy Stark MD;  Location: [facility] OR;  Service: Orthopedics;  Laterality: Right;    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  07/26/2021    COLONOSCOPY      cyberknife for left petroclival tumor  04/20/2009    EYE SURGERY      HYSTERECTOMY  2009    Left C5-6, C6-7 posterior foraminotomies  08/20/2014    [facility]    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  06/04/2020    [facility]    removal of kidney stone  1995    TRIGGER FINGER RELEASE Right 06/07/2024    Procedure: RELEASE, TRIGGER FINGER; 3rd and 4th finger;  Surgeon: Yuriy Stark MD;  Location: [facility] OR;  Service: Orthopedics;  Laterality: Right;       Review of patient's allergies indicates:   Allergen Reactions    Codeine     Sulfa (sulfonamide antibiotics) Rash and Other (See Comments)       Current Neurological Medications:     No current facility-administered medications on file prior to encounter.     Current Outpatient Medications on File Prior to Encounter   Medication Sig    amLODIPine (NORVASC) 10 MG tablet Take 10 mg by mouth once daily.    aspirin (ECOTRIN) 81 MG EC tablet Take 1 tablet by mouth once daily.    atorvastatin (LIPITOR) 10 MG tablet Take 10 mg by mouth every evening.    biotin 5,000 mcg TbDL Take 1 tablet by mouth Daily.    calcium carbonate (OS-MATEO) 600 mg calcium (1,500 mg) Tab Take 600 mg by mouth 2 (two) times daily with meals.    denosumab (PROLIA) 60 mg/mL Syrg Inject 60 mg into the skin every 6 (six) months.    ergocalciferol  (ERGOCALCIFEROL) 50,000 unit Cap Take 50,000 Units by mouth every 6 weeks.    erythromycin (ROMYCIN) ophthalmic ointment Place into the left eye 4 (four) times daily.    erythromycin base (ERYTHROMYCIN OPHT) Apply to eye 3 (three) times daily.    estradioL (ESTRACE) 1 MG tablet Take 1 mg by mouth nightly.    folic acid (FOLVITE) 1 MG tablet Take 1 tablet by mouth once daily.    gabapentin (NEURONTIN) 300 MG capsule TAKE 1 CAPSULE BY MOUTH THREE TIMES A DAY    HYDROcodone-acetaminophen (NORCO) 5-325 mg per tablet Take 1 tablet by mouth every 6 (six) hours as needed for Pain. (Patient not taking: Reported on 9/12/2024)    ipratropium (ATROVENT) 21 mcg (0.03 %) nasal spray 2 sprays by Each Nostril route 3 (three) times daily.    losartan (COZAAR) 100 MG tablet Take 100 mg by mouth once daily.    metoprolol succinate (TOPROL-XL) 100 MG 24 hr tablet Take 100 mg by mouth nightly.    metoprolol succinate (TOPROL-XL) 50 MG 24 hr tablet Take 1 tablet by mouth every morning.    vitamin K2 100 mcg Cap Take 1 capsule by mouth Daily.     Family History       Problem Relation (Age of Onset)    Arthritis Mother    Cancer Father, Sister, Maternal Uncle, Paternal Uncle    Diabetes Paternal Grandmother, Paternal Aunt    Diabetes Mellitus Paternal Grandmother    Early death Paternal Aunt    Heart disease Mother, Paternal Grandfather    Hyperlipidemia Mother, Father    Hypertension Mother, Father, Sister, Brother, Sister, Brother, Sister    Kidney disease Mother, Father    Lung cancer Paternal Grandfather    Miscarriages / Stillbirths Sister    Skin cancer Father    Stroke Father    Thyroid cancer Sister          Tobacco Use    Smoking status: Never    Smokeless tobacco: Never   Substance and Sexual Activity    Alcohol use: Not Currently     Comment: rarely    Drug use: Never    Sexual activity: Yes     Partners: Male     Birth control/protection: See Surgical Hx     Review of Systems   All other systems reviewed and are  negative.    Objective:     Vital Signs (Most Recent):  Temp: 97.5 °F (36.4 °C) (06/09/25 1017)  Pulse: 78 (06/09/25 1112)  Resp: 13 (06/09/25 1112)  BP: (!) 138/115 (06/09/25 1112)  SpO2: 99 % (06/09/25 1112) Vital Signs (24h Range):  Temp:  [97.5 °F (36.4 °C)] 97.5 °F (36.4 °C)  Pulse:  [78-79] 78  Resp:  [13-18] 13  SpO2:  [99 %] 99 %  BP: (138-177)/() 138/115     Weight: 45.3 kg (99 lb 13.9 oz)  Body mass index is 18.27 kg/m².     Physical Exam  Vitals reviewed.   HENT:      Head: Normocephalic and atraumatic.      Nose: Nose normal.      Mouth/Throat:      Mouth: Mucous membranes are moist.   Eyes:      Pupils: Pupils are equal, round, and reactive to light.   Pulmonary:      Effort: Pulmonary effort is normal.   Musculoskeletal:         General: Normal range of motion.      Cervical back: Normal range of motion and neck supple.   Skin:     General: Skin is warm and dry.      Capillary Refill: Capillary refill takes less than 2 seconds.   Neurological:      Mental Status: She is alert and oriented to person, place, and time.      Comments: Left facial droop  Sensation intact bilaterally  VFF  Antigravity in all extremities            NEUROLOGICAL EXAMINATION:     MENTAL STATUS   Oriented to person, place, and time.     CRANIAL NERVES     CN III, IV, VI   Pupils are equal, round, and reactive to light.      Significant Labs: All pertinent lab results from the past 24 hours have been reviewed.    Significant Imaging: I have reviewed all pertinent imaging results/findings within the past 24 hours.

## 2025-06-09 NOTE — ED PROVIDER NOTES
Encounter Date: 6/9/2025    SCRIBE #1 NOTE: I, Colleen Yanez, am scribing for, and in the presence of,  Sterling Milian MD. I have scribed the following portions of the note - Other sections scribed: HPI, ROS, PE.       History     Chief Complaint   Patient presents with    Facial Droop     Difficulty speaking and walking starting yesterday evening at unknown time. L sided facial droop baseline s/t brain tumor but more severe since yesterday as well. Headache since Thursday.  VAN negative.      Patient is a 64 year old female with a history of benign meningioma status post resection, HLD, HTN, presenting to the ED with left sided weakness. Patient's  states she had a brain tumor causing damage to her left sided facial nerves, but the weakness has worsened since last night. Patient states she was having trouble eating and swallowing and when she tried to walk she felt the room was spinning. Patient's  states he noticed this morning the patient was dragging her left leg as she walked. Patient also complains of headaches, nausea, and vomiting since Thursday. Patient denies any changes in her bowel movements and urination.     The history is provided by the patient, the spouse and medical records.     Review of patient's allergies indicates:   Allergen Reactions    Codeine     Sulfa (sulfonamide antibiotics) Rash and Other (See Comments)     Past Medical History:   Diagnosis Date    Benign meningioma     Carpal tunnel syndrome on right     Cataract     Cervical disc displacement     Cervical spondylosis     Hard of hearing     Hyperlipidemia     Hypertension     Neoplasm, brain     Nephrolithiasis     Neurotrophic keratoconjunctivitis     Osteoporosis      Past Surgical History:   Procedure Laterality Date    BRAIN SURGERY  06/04/2020    CARPAL TUNNEL RELEASE Right 06/07/2024    Procedure: RELEASE, CARPAL TUNNEL;  Surgeon: Yuriy Stark MD;  Location: [facility];  Service: Orthopedics;  Laterality:  Right;    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  07/26/2021    COLONOSCOPY      cyberknife for left petroclival tumor  04/20/2009    EYE SURGERY      HYSTERECTOMY  2009    Left C5-6, C6-7 posterior foraminotomies  08/20/2014    [facility]    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  06/04/2020    [facility]    removal of kidney stone  1995    TRIGGER FINGER RELEASE Right 06/07/2024    Procedure: RELEASE, TRIGGER FINGER; 3rd and 4th finger;  Surgeon: Yuriy Stark MD;  Location: [facility] OR;  Service: Orthopedics;  Laterality: Right;     Family History   Problem Relation Name Age of Onset    Kidney disease Mother Maribel Auzenne     Hyperlipidemia Mother Maribel Auzenne     Hypertension Mother Maribel Auzenne     Arthritis Mother Maribel Auzenne     Heart disease Mother Maribel Auzenne     Stroke Father Stanley Auzenne     Kidney disease Father Stanley Auzenne     Skin cancer Father Stanley Auzenne     Hyperlipidemia Father Stanley Auzenne     Hypertension Father Stanley Auzenne     Cancer Father Stanley Auzenne     Thyroid cancer Sister      Hypertension Sister      Hypertension Brother Stanley Auzenne Jr     Diabetes Mellitus Paternal Grandmother Kassandra Auzenne     Diabetes Paternal Grandmother Kassandra Auzenne     Lung cancer Paternal Grandfather Scooter Auzenne     Heart disease Paternal Grandfather Scooter Auzenne     Cancer Sister Veronika Brayan     Hypertension Sister Veronika Brayan     Miscarriages / Stillbirths Sister Veronika Brayan     Cancer Maternal Uncle Jonathan Kayy     Cancer Paternal Uncle Versjob Auzenne     Diabetes Paternal Aunt Candie Mandujano     Early death Paternal Aunt Mimi Duque     Hypertension Brother Sebastien Auzenne     Hypertension Sister Nataliia Simmons      Social History[1]  Review of Systems   HENT:  Positive for trouble swallowing.    Gastrointestinal:  Positive for nausea and vomiting. Negative for constipation and diarrhea.   Genitourinary:  Negative for difficulty urinating.   Neurological:   Positive for dizziness, facial asymmetry, weakness and headaches.       Physical Exam     Initial Vitals [06/09/25 1017]   BP Pulse Resp Temp SpO2   (!) 177/97 79 18 97.5 °F (36.4 °C) 99 %      MAP       --         Physical Exam    Nursing note and vitals reviewed.  Constitutional: She appears well-developed and well-nourished. She is not diaphoretic. No distress.   HENT:   Head: Normocephalic and atraumatic.   Right Ear: External ear normal.   Left Ear: External ear normal.   Nose: Nose normal.   Unable to lift left eyebrow.   Left sided facial droop with a complete loss of the nasolabial fold on the left   Eyes: Conjunctivae and EOM are normal. Pupils are equal, round, and reactive to light. Right eye exhibits no discharge. Left eye exhibits no discharge.   Cardiovascular:  Normal rate, regular rhythm, normal heart sounds and intact distal pulses.     Exam reveals no gallop and no friction rub.       No murmur heard.  Pulmonary/Chest: Breath sounds normal. No respiratory distress. She has no wheezes. She has no rhonchi. She has no rales. She exhibits no tenderness.   Abdominal: Abdomen is soft. Bowel sounds are normal. She exhibits no distension and no mass. There is no abdominal tenderness. There is no rebound and no guarding.   Musculoskeletal:         General: No edema. Normal range of motion.      Comments: 5 out of 5 strength to the right lower extremity  4 out of 5 strength to the left lower extremity  5 out of 5 strength to bilateral upper extremities.      Neurological: She is alert and oriented to person, place, and time. No cranial nerve deficit or sensory deficit.   Unable to lift left eyebrow.   Left sided facial droop.    Skin: Skin is warm and dry. Capillary refill takes less than 2 seconds. No erythema. No pallor.         ED Course   Procedures  Labs Reviewed   COMPREHENSIVE METABOLIC PANEL - Abnormal       Result Value    Sodium 139      Potassium 3.3 (*)     Chloride 101      CO2 25      Glucose  110      Blood Urea Nitrogen 15.3      Creatinine 0.90      Calcium 10.4 (*)     Protein Total 8.4 (*)     Albumin 4.2      Globulin 4.2 (*)     Albumin/Globulin Ratio 1.0 (*)     Bilirubin Total 0.7      ALP 85      ALT 22      AST 26      eGFR >60      Anion Gap 13.0      BUN/Creatinine Ratio 17     PROTIME-INR - Abnormal    PT 12.1 (*)     INR 0.9      Narrative:     Protimes are used to monitor anticoagulant agents such as warfarin. PT INR values are based on the current patient normal mean and the CARLEY value for the specific instrument reagent used.  **Routine theraputic target values for the INR are 2.0-3.0**   CBC WITH DIFFERENTIAL - Abnormal    WBC 10.92      RBC 5.39      Hgb 14.6      Hct 45.0      MCV 83.5      MCH 27.1      MCHC 32.4 (*)     RDW 13.2      Platelet 314      MPV 9.8      Neut % 79.5      Lymph % 11.0      Mono % 8.4      Eos % 0.2      Basophil % 0.5      Imm Grans % 0.4      Neut # 8.69      Lymph # 1.20      Mono # 0.92      Eos # 0.02      Baso # 0.05      Imm Gran # 0.04      NRBC% 0.0     POCT GLUCOSE - Abnormal    POCT Glucose 118 (*)    APTT - Normal    PTT 28.9     CBC W/ AUTO DIFFERENTIAL    Narrative:     The following orders were created for panel order CBC Auto Differential.  Procedure                               Abnormality         Status                     ---------                               -----------         ------                     CBC with Differential[[id number]]       Abnormal            Final result                 Please view results for these tests on the individual orders.          Imaging Results              MRI Brain W WO Contrast (In process)  Result time 06/09/25 14:12:48                     CTA Head and Neck (xpd) (Final result)  Result time 06/09/25 12:19:04      Final result by Bernardo Roy MD (06/09/25 12:19:04)                   Impression:      1. No flow-limiting stenosis or large vessel occlusion within the intracranial arterial  vasculature.  2. No flow-limiting stenosis within the cervical arterial vasculature.  3. Patent dural venous sinuses.      Electronically signed by: Bernardo Roy MD  Date:    06/09/2025  Time:    12:19               Narrative:    EXAMINATION:  CTA HEAD AND NECK (XPD)    CLINICAL HISTORY:  Vertigo, central;    TECHNIQUE:  CT angiogram was performed from the level of the minor to the top of the head following the IV administration of contrast. 100 mL Isovue 370.  Sagittal and coronal reconstructions and maximum intensity projection reconstructions were performed. Arterial stenosis percentages are based on NASCET measurement criteria.  Automated exposure control, dose radiation lowering technique was utilized.    COMPARISON:  No prior CTA    FINDINGS:  CTA NECK:    Common origin of the brachiocephalic trunk and left common carotid artery.  Left vertebral artery arises from the aortic arch, normal anatomic variants.    Patent right common carotid artery.  Small amount of atherosclerotic plaque at the carotid bulb without flow-limiting stenosis.  Patent external carotid artery.  Continuing cervical ICA is widely patent.    Patent left common carotid artery with small amount of calcified atherosclerotic plaque at the distal common carotid artery.  Patent carotid bulb with no flow-limiting stenosis.  Continuing cervical ICA is widely patent.  Patent external carotid artery.    Dominant right vertebral artery.  No flow-limiting stenosis at throughout the courses of either vertebral artery.    CTA HEAD:    No flow-limiting stenosis within the intracranial arterial vasculature.  There is mild calcified atherosclerotic plaque along the carotid siphons bilaterally.    Patent dural venous sinuses.    Other:    Pleuroparenchymal scarring is present at the lung apices.  Degenerative disc changes are present at the cervical spine.                                       CT Head Without Contrast (Final result)  Result time  06/09/25 11:52:47      Final result by Bernardo Roy MD (06/09/25 11:52:47)                   Impression:      1. Loss of gray-white differentiation at the right frontal lobe (best seen image 30, series 2).  Findings suspicious for acute infarct.  No evidence of hemorrhagic transformation.  2. No evidence of intraparenchymal hemorrhage.  3. Findings most consistent with interval enlargement of left CP angle mass since 07/18/2024.  Findings on MRI from 07/19/2024 suggested underlying meningioma.      Electronically signed by: Bernardo Roy MD  Date:    06/09/2025  Time:    11:52               Narrative:    EXAMINATION:  CT HEAD WITHOUT CONTRAST    INDICATION:  Neuro deficit, acute, stroke suspected;Facial Droop (Difficulty speaking and walking starting yesterday evening at unknown time. L sided facial droop baseline s/t brain tumor but more severe since yesterday as well. Headache since Thursday.  VAN negative. )    TECHNIQUE:  Contiguous axial images are acquired through the head without IV contrast.  These images were reconstructed into the coronal and sagittal plane.  Automated exposure control, dose radiation lowering technique was utilized.    COMPARISON:  Brain MRI from 07/18/2024    FINDINGS:  Patient status post left frontotemporal craniotomy.  Clear mastoid air cells.  Left temporal encephalomalacia is present deep to the postsurgical changes.  Findings are similar compared to 07/18/2024 when allowing for differences in technique.    There is a geographic area of decreased density at the right frontal lobe with loss of gray-white differentiation.  No extra-axial fluid collection, contusion or hemorrhage.  No midline shift.  Patent basilar cisterns.  No hyperdense vessel sign.    Partially calcified right frontal convexity, presumed meningioma measures 1.2 x 0.8 cm, similar in size since 07/18/2024.    Left CP angle mass with erosion at the petrous apex measures approximately 3.7 by 2.5 cm,  larger since 07/18/2024, previously measuring 1.5 x 0.6 cm.                                       Medications   iopamidoL (ISOVUE-370) injection 100 mL (100 mLs Intravenous Given 6/9/25 1131)   gadobenate dimeglumine (MULTIHANCE) injection 9 mL (9 mLs Intravenous Given 6/9/25 1346)     Medical Decision Making  The differential diagnosis includes, but is not limited to, CVA, TIA, electrolyte imbalance, renal dysfunction, head bleed, brain mass/neoplasm, worsening meningioma.      Patient is awake and alert.  That has indeed have some left lower side of weakness.  Inability to raise eyebrows on the left side of the face global family reports that she has chronic left-sided weakness secondary to her meningioma surgeries but apparently he is worse.  Concern for Bell's palsy but given her sensation of room spinning, weakness in the left leg possible CVA.  Have discussed with neurosurgery as well with concern for possible enlarging and meningioma.  Both Neurology Neurosurgery agree with a MRIs.  That has has been ordered.  Will admit for further workup weather for CVA versus TIA versus brain mass.  Both patient,  room comfortable with the plan.  Discussed with the [facility] on-call for hospitalist.  Will admit.  Care to be transferred    Amount and/or Complexity of Data Reviewed  External Data Reviewed: notes.  Labs: ordered. Decision-making details documented in ED Course.  Radiology: ordered and independent interpretation performed. Decision-making details documented in ED Course.    Risk  Prescription drug management.  Decision regarding hospitalization.         Scribe Attestation:   Scribe #1: I performed the above scribed service and the documentation accurately describes the services I performed. I attest to the accuracy of the note.    Attending Attestation:           Physician Attestation for Scribe:  Physician Attestation Statement for Scribe #1: I, Sterling Milian MD, reviewed documentation, as scribed by  Colleen Yanez in my presence, and it is both accurate and complete.             ED Course as of 06/09/25 1413   Mon Jun 09, 2025   1044 Chart review reveals patient is followed by Neurosurgery, Dr. Lagos, 4 year status post near total resection of left petroclival meningioma in 3 or status post CyberKnife for residual tumor.  History of left C5-6 and C6-7 posterior foraminotomies.  She had stable residual tumor in MRI at that time. [MM]   1200 Paged neuro [AF]   1214 Discussed with neurosurgeon, Dr. Lagos, agrees with a MRIs.  will follow in consultation [MM]   1215 Discussed with Neurology will follow [MM]   1409 Sodium: 139 [MM]   1409 Potassium(!): 3.3 [MM]   1409 Chloride: 101 [MM]   1409 CO2: 25 [MM]   1409 BUN: 15.3 [MM]   1409 Creatinine: 0.90 [MM]   1409 INR: 0.9 [MM]   1409 PTT: 28.9 [MM]   1409 WBC: 10.92 [MM]   1409 Hemoglobin: 14.6 [MM]   1409 Hematocrit: 45.0 [MM]   1409 POCT Glucose(!): 118 [MM]   1409 CT Head Without Contrast  Abnormality left cerebral pontine angle [MM]      ED Course User Index  [AF] Colleen Yanez  [MM] Sterling Milian MD                           Clinical Impression:  Final diagnoses:  [R53.1] Acute left-sided weakness          ED Disposition Condition    Admit                       [1]   Social History  Tobacco Use    Smoking status: Never    Smokeless tobacco: Never   Substance Use Topics    Alcohol use: Not Currently     Comment: rarely    Drug use: Never        Sterling Milian MD  06/09/25 1413

## 2025-06-09 NOTE — PLAN OF CARE
Problem: Adult Inpatient Plan of Care  Goal: Plan of Care Review  Outcome: Progressing  Goal: Patient-Specific Goal (Individualized)  Outcome: Progressing  Goal: Absence of Hospital-Acquired Illness or Injury  Outcome: Progressing  Goal: Optimal Comfort and Wellbeing  Outcome: Progressing  Goal: Readiness for Transition of Care  Outcome: Progressing     Problem: Infection  Goal: Absence of Infection Signs and Symptoms  Outcome: Progressing     Problem: Stroke, Ischemic (Includes Transient Ischemic Attack)  Goal: Optimal Coping  Outcome: Progressing  Goal: Effective Bowel Elimination  Outcome: Progressing  Goal: Optimal Cerebral Tissue Perfusion  Outcome: Progressing  Goal: Optimal Cognitive Function  Outcome: Progressing  Goal: Improved Communication Skills  Outcome: Progressing  Goal: Optimal Functional Ability  Outcome: Progressing  Goal: Optimal Nutrition Intake  Outcome: Progressing  Goal: Effective Oxygenation and Ventilation  Outcome: Progressing  Goal: Improved Sensorimotor Function  Outcome: Progressing  Goal: Safe and Effective Swallow  Outcome: Progressing  Goal: Effective Urinary Elimination  Outcome: Progressing     Problem: Wound  Goal: Optimal Coping  Outcome: Progressing  Goal: Optimal Functional Ability  Outcome: Progressing  Goal: Absence of Infection Signs and Symptoms  Outcome: Progressing  Goal: Improved Oral Intake  Outcome: Progressing  Goal: Optimal Pain Control and Function  Outcome: Progressing  Goal: Skin Health and Integrity  Outcome: Progressing  Goal: Optimal Wound Healing  Outcome: Progressing     Problem: Skin Injury Risk Increased  Goal: Skin Health and Integrity  Outcome: Progressing

## 2025-06-09 NOTE — FIRST PROVIDER EVALUATION
Medical screening examination initiated.  I have conducted a focused provider triage encounter, findings are as follows:    Brief history of present illness:  Patient began with difficulty speaking yesterday. Patient states that she noticed worsening of her chronic left sided facial droop.     There were no vitals filed for this visit.    Pertinent physical exam:  Awake, alert    Brief workup plan:  Labs, Imaging      Preliminary workup initiated; this workup will be continued and followed by the physician or advanced practice provider that is assigned to the patient when roomed.

## 2025-06-10 PROBLEM — E43 SEVERE MALNUTRITION: Status: ACTIVE | Noted: 2025-06-10

## 2025-06-10 LAB
ALBUMIN SERPL-MCNC: 3.7 G/DL (ref 3.4–4.8)
ALBUMIN/GLOB SERPL: 0.9 RATIO (ref 1.1–2)
ALP SERPL-CCNC: 78 UNIT/L (ref 40–150)
ALT SERPL-CCNC: 20 UNIT/L (ref 0–55)
ANION GAP SERPL CALC-SCNC: 13 MEQ/L
APTT PPP: 29.3 SECONDS (ref 23.2–33.7)
AST SERPL-CCNC: 21 UNIT/L (ref 11–45)
BASOPHILS # BLD AUTO: 0.01 X10(3)/MCL
BASOPHILS NFR BLD AUTO: 0.1 %
BILIRUB SERPL-MCNC: 0.4 MG/DL
BUN SERPL-MCNC: 21.2 MG/DL (ref 9.8–20.1)
CALCIUM SERPL-MCNC: 10 MG/DL (ref 8.4–10.2)
CHLORIDE SERPL-SCNC: 102 MMOL/L (ref 98–107)
CO2 SERPL-SCNC: 25 MMOL/L (ref 23–31)
CREAT SERPL-MCNC: 0.77 MG/DL (ref 0.55–1.02)
CREAT/UREA NIT SERPL: 28
EOSINOPHIL # BLD AUTO: 0 X10(3)/MCL (ref 0–0.9)
EOSINOPHIL NFR BLD AUTO: 0 %
ERYTHROCYTE [DISTWIDTH] IN BLOOD BY AUTOMATED COUNT: 13.1 % (ref 11.5–17)
GFR SERPLBLD CREATININE-BSD FMLA CKD-EPI: >60 ML/MIN/1.73/M2
GLOBULIN SER-MCNC: 3.9 GM/DL (ref 2.4–3.5)
GLUCOSE SERPL-MCNC: 141 MG/DL (ref 82–115)
HCT VFR BLD AUTO: 39.4 % (ref 37–47)
HGB BLD-MCNC: 13.4 G/DL (ref 12–16)
IMM GRANULOCYTES # BLD AUTO: 0.03 X10(3)/MCL (ref 0–0.04)
IMM GRANULOCYTES NFR BLD AUTO: 0.4 %
INR PPP: 1
LYMPHOCYTES # BLD AUTO: 0.47 X10(3)/MCL (ref 0.6–4.6)
LYMPHOCYTES NFR BLD AUTO: 6.2 %
MAGNESIUM SERPL-MCNC: 1.9 MG/DL (ref 1.6–2.6)
MCH RBC QN AUTO: 27.4 PG (ref 27–31)
MCHC RBC AUTO-ENTMCNC: 34 G/DL (ref 33–36)
MCV RBC AUTO: 80.6 FL (ref 80–94)
MONOCYTES # BLD AUTO: 0.14 X10(3)/MCL (ref 0.1–1.3)
MONOCYTES NFR BLD AUTO: 1.8 %
NEUTROPHILS # BLD AUTO: 6.98 X10(3)/MCL (ref 2.1–9.2)
NEUTROPHILS NFR BLD AUTO: 91.5 %
NRBC BLD AUTO-RTO: 0 %
PHOSPHATE SERPL-MCNC: 4.3 MG/DL (ref 2.3–4.7)
PLATELET # BLD AUTO: 290 X10(3)/MCL (ref 130–400)
PMV BLD AUTO: 9.5 FL (ref 7.4–10.4)
POTASSIUM SERPL-SCNC: 3.5 MMOL/L (ref 3.5–5.1)
PROT SERPL-MCNC: 7.6 GM/DL (ref 5.8–7.6)
PROTHROMBIN TIME: 12.8 SECONDS (ref 12.5–14.5)
RBC # BLD AUTO: 4.89 X10(6)/MCL (ref 4.2–5.4)
SODIUM SERPL-SCNC: 140 MMOL/L (ref 136–145)
TROPONIN I SERPL-MCNC: 0.02 NG/ML (ref 0–0.04)
WBC # BLD AUTO: 7.63 X10(3)/MCL (ref 4.5–11.5)

## 2025-06-10 PROCEDURE — 83735 ASSAY OF MAGNESIUM: CPT | Performed by: INTERNAL MEDICINE

## 2025-06-10 PROCEDURE — 85610 PROTHROMBIN TIME: CPT | Performed by: INTERNAL MEDICINE

## 2025-06-10 PROCEDURE — 84100 ASSAY OF PHOSPHORUS: CPT | Performed by: INTERNAL MEDICINE

## 2025-06-10 PROCEDURE — 85730 THROMBOPLASTIN TIME PARTIAL: CPT | Performed by: INTERNAL MEDICINE

## 2025-06-10 PROCEDURE — 84484 ASSAY OF TROPONIN QUANT: CPT | Performed by: INTERNAL MEDICINE

## 2025-06-10 PROCEDURE — 80053 COMPREHEN METABOLIC PANEL: CPT | Performed by: INTERNAL MEDICINE

## 2025-06-10 PROCEDURE — 97535 SELF CARE MNGMENT TRAINING: CPT

## 2025-06-10 PROCEDURE — 97162 PT EVAL MOD COMPLEX 30 MIN: CPT

## 2025-06-10 PROCEDURE — 25500020 PHARM REV CODE 255: Performed by: INTERNAL MEDICINE

## 2025-06-10 PROCEDURE — 21400001 HC TELEMETRY ROOM

## 2025-06-10 PROCEDURE — 97166 OT EVAL MOD COMPLEX 45 MIN: CPT

## 2025-06-10 PROCEDURE — A9698 NON-RAD CONTRAST MATERIALNOC: HCPCS | Performed by: INTERNAL MEDICINE

## 2025-06-10 PROCEDURE — 63600175 PHARM REV CODE 636 W HCPCS: Performed by: INTERNAL MEDICINE

## 2025-06-10 PROCEDURE — 92611 MOTION FLUOROSCOPY/SWALLOW: CPT

## 2025-06-10 PROCEDURE — 25000003 PHARM REV CODE 250: Performed by: INTERNAL MEDICINE

## 2025-06-10 PROCEDURE — 85025 COMPLETE CBC W/AUTO DIFF WBC: CPT | Performed by: INTERNAL MEDICINE

## 2025-06-10 PROCEDURE — 92610 EVALUATE SWALLOWING FUNCTION: CPT

## 2025-06-10 PROCEDURE — 99232 SBSQ HOSP IP/OBS MODERATE 35: CPT | Mod: ,,, | Performed by: SPECIALIST

## 2025-06-10 PROCEDURE — 97551 CAREGIVER TRAING EA ADDL 15: CPT

## 2025-06-10 PROCEDURE — 36415 COLL VENOUS BLD VENIPUNCTURE: CPT | Performed by: INTERNAL MEDICINE

## 2025-06-10 RX ADMIN — ERYTHROMYCIN: 5 OINTMENT OPHTHALMIC at 01:06

## 2025-06-10 RX ADMIN — DEXAMETHASONE SODIUM PHOSPHATE 4 MG: 4 INJECTION, SOLUTION INTRA-ARTICULAR; INTRALESIONAL; INTRAMUSCULAR; INTRAVENOUS; SOFT TISSUE at 05:06

## 2025-06-10 RX ADMIN — ERYTHROMYCIN: 5 OINTMENT OPHTHALMIC at 09:06

## 2025-06-10 RX ADMIN — HYDRALAZINE HYDROCHLORIDE 10 MG: 20 INJECTION INTRAMUSCULAR; INTRAVENOUS at 10:06

## 2025-06-10 RX ADMIN — BARIUM SULFATE 5 ML: 0.81 POWDER, FOR SUSPENSION ORAL at 11:06

## 2025-06-10 RX ADMIN — METOPROLOL SUCCINATE 100 MG: 50 TABLET, EXTENDED RELEASE ORAL at 09:06

## 2025-06-10 RX ADMIN — GABAPENTIN 300 MG: 300 CAPSULE ORAL at 09:06

## 2025-06-10 RX ADMIN — ERYTHROMYCIN: 5 OINTMENT OPHTHALMIC at 05:06

## 2025-06-10 RX ADMIN — DEXAMETHASONE SODIUM PHOSPHATE 4 MG: 4 INJECTION, SOLUTION INTRA-ARTICULAR; INTRALESIONAL; INTRAMUSCULAR; INTRAVENOUS; SOFT TISSUE at 12:06

## 2025-06-10 NOTE — PT/OT/SLP EVAL
"Occupational Therapy  Evaluation    Name: Kaity Cuevas  MRN: [id number]  Recent Surgery: * No surgery found *      Recommendations:     Discharge therapy intensity: High Intensity Therapy   Discharge Equipment Recommendations:   (tbd)  Barriers to discharge:       Assessment:   Kaity Cuevas is a 64 y.o. 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    She presents with the following performance deficits affecting function: weakness, impaired endurance, impaired self care skills, impaired functional mobility, visual deficits, impaired balance, impaired coordination, decreased lower extremity function. AT eval, pt noted with impaired BUE coordination with L worse than R, impaired mobility and decreased ADL performance from baseline. Pt with excellent effort and motivated to improve independence, recommend high intensity therapy at d/c.     Rehab Prognosis: good; patient would benefit from acute skilled OT services to address these deficits and reach maximum level of function.       Plan:     Patient to be seen 6 x/week to address the above listed problems via self-care/home management, therapeutic activities, therapeutic exercises  Plan of Care Expires: 07/08/25  Plan of Care Reviewed with: patient, family    Subjective     Chief Complaint: HA  Patient/Family Comments/goals: "it's slower than usual"    Occupational Profile:  Living Environment: lives with  in SS home with walk in shower and seat, grab bars  Previous level of function: occasional assist with bathing, independent with BADL's  Roles and Routines: wife, mom, family member  Equipment Used at Home:  tub seat  Assistance upon Discharge: family can assist    Pain/Comfort:  Pain Rating 1:  (HA, 5/10)    Patients cultural, spiritual, Hinduism conflicts given the current situation:      Objective:     OT communicated with nsg prior to session.      Patient " was found supine with telemetry, peripheral IV upon OT entry to room.    General Precautions: Standard, aspiration, fall (<150)  Orthopedic Precautions:    Braces:      Vital Signs: 140/78      Functional Mobility/Transfers:  Sup to sit with min/mod assist, cues  Functional Mobility: sit to stand with min assist, steps along EOB with min assist and RW    Activities of Daily Living:  Able to don socks seated Eob with SBA, CGA balance    AMPAC 6 Click ADL:  AMPA Total Score:      Functional Cognition:  intact    Visual Perceptual Skills:  Decreased peripheral vision L after sx for L pontine cerebellar mass  Able to ID objects on L with scanning, grossly intact to R      Upper Extremity Function:  Right Upper Extremity:   Wfl strength, slowed F>F and F>N     Left Upper Extremity:  Wfl strength, decreased F>F and F>N with slowed speed from R, decreased general UE coordination    Balance:   Sitting CgA/SBA  Standing with RW with CGA    Patient Education:  Patient and daughter/s were provided with verbal education education regarding OT role/goals/POC and Discharge/DME recommendations.  Understanding was verbalized.     Patient left supine with all lines intact, call button in reach, and nsg and family present.    GOALS:   Multidisciplinary Problems       Occupational Therapy Goals          Problem: Occupational Therapy    Goal Priority Disciplines Outcome Interventions   Occupational Therapy Goal     OT, PT/OT Progressing    Description: Goals to be met by: 7/8/25     Patient will increase functional independence with ADLs by performing:    UE Dressing with Modified [place].  LE Dressing with Modified [place].  Grooming while standing with Modified [place].  Toileting from toilet with Modified [place] for hygiene and clothing management.   Toilet transfer to toilet with Modified [place]. Progress from commode as appropriate.                          History:     Past Medical History:   Diagnosis  Date    Benign meningioma     Carpal tunnel syndrome on right     Cataract     Cervical disc displacement     Cervical spondylosis     Hard of hearing     Hyperlipidemia     Hypertension     Neoplasm, brain     Nephrolithiasis     Neurotrophic keratoconjunctivitis     Osteoporosis          Past Surgical History:   Procedure Laterality Date    BRAIN SURGERY  06/04/2020    CARPAL TUNNEL RELEASE Right 06/07/2024    Procedure: RELEASE, CARPAL TUNNEL;  Surgeon: Yuriy Stark MD;  Location: [facility] OR;  Service: Orthopedics;  Laterality: Right;    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  07/26/2021    COLONOSCOPY      cyberknife for left petroclival tumor  04/20/2009    EYE SURGERY      HYSTERECTOMY  2009    Left C5-6, C6-7 posterior foraminotomies  08/20/2014    [facility]    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  06/04/2020    [facility]    removal of kidney stone  1995    TRIGGER FINGER RELEASE Right 06/07/2024    Procedure: RELEASE, TRIGGER FINGER; 3rd and 4th finger;  Surgeon: Yuriy Stark MD;  Location: [facility] OR;  Service: Orthopedics;  Laterality: Right;       Time Tracking:     OT Date of Treatment: 06/10/25  OT Start Time: 1446  OT Stop Time: 1505  OT Total Time (min): 19 min    Billable Minutes:Evaluation mod complexity    6/10/2025

## 2025-06-10 NOTE — PLAN OF CARE
06/10/25 1625   Discharge Assessment   Assessment Type Discharge Planning Assessment   Confirmed/corrected address, phone number and insurance Yes   Confirmed Demographics Correct on Facesheet   Source of Information patient;family   Communicated MIRIAN with patient/caregiver Date not available/Unable to determine   People in Home child(pedro), adult;spouse   Name(s) of People in Home Francisco Javier Cervantes [phone]   Do you expect to return to your current living situation? Yes   Do you have help at home or someone to help you manage your care at home? Yes   Who are your caregiver(s) and their phone number(s)? Francisco Javier Cervantes [phone]   Prior to hospitilization cognitive status: Alert/Oriented   Current cognitive status: Alert/Oriented   Equipment Currently Used at Home walker, rolling;shower chair   Readmission within 30 days? No   Patient currently being followed by outpatient case management? No   Do you currently have service(s) that help you manage your care at home? No   Do you take prescription medications? Yes   Do you have prescription coverage? No   Do you have any problems affording any of your prescribed medications? No   Is the patient taking medications as prescribed? no   Who is going to help you get home at discharge? Francisco Javier Cervantes [phone]   How do you get to doctors appointments? family or friend will provide   Are you on dialysis? No   Do you take coumadin? No   Discharge Plan A Rehab   Discharge Plan B Home Health   DME Needed Upon Discharge  other (see comments)  (TBD)   Discharge Plan discussed with: Patient;Adult children   Transition of Care Barriers None     Patient admitted for CVA. PCP is Dr. Tabby Parker.  Pharmacy is [facility] in [place]. Does not have home health.  Will follow for discharge planning needs.

## 2025-06-10 NOTE — PROGRESS NOTES
Inpatient Nutrition Assessment    Admit Date: 6/9/2025   Total duration of encounter: 1 day   Patient Age: 64 y.o.    Nutrition Recommendation/Prescription     Continue NPO until cleared for oral diet. Texture modifications per SLP.     If unable to resume oral diet, recommend NG placement for enteral nutrition to best meet nutritional needs. Would start tube feedings at 15mL/hr and advance 10mL q8hr as tolerated to goal rate. Monitor and replace electrolytes as need.     Goal tube feeding recommendations   Isosource HN goal rate 55mL/hr will provide  1320kcals/d (83% est needs)  59g protein/d (88% est needs)  891mL fl/d (66% est needs, recommend 50mL q2hr water flush if no IVF for a total of 1391mL fl/d)  (calculations based on estimated 20 hr/d run time)       Communication of Recommendations: reviewed with nurse, reviewed with patient, and reviewed with family    Nutrition Assessment     Malnutrition Assessment/Nutrition-Focused Physical Exam    Malnutrition Context: acute illness or injury (06/10/25 1043)  Malnutrition Level: severe (06/10/25 1043)  Energy Intake (Malnutrition): less than or equal to 50% for greater than or equal to 5 days (06/10/25 1043)  Weight Loss (Malnutrition): other (see comments) (Unable to assess) (06/10/25 1043)  Subcutaneous Fat (Malnutrition): mild depletion (06/10/25 1043)  Orbital Region (Subcutaneous Fat Loss): mild depletion  Upper Arm Region (Subcutaneous Fat Loss): mild depletion     Muscle Mass (Malnutrition): moderate depletion (06/10/25 1043)  Synagogue Region (Muscle Loss): moderate depletion  Clavicle Bone Region (Muscle Loss): moderate depletion                    Fluid Accumulation (Malnutrition): other (see comments) (Not present) (06/10/25 1043)        A minimum of two characteristics is recommended for diagnosis of either severe or non-severe malnutrition.    Chart Review    Reason Seen: malnutrition screening tool (MST)    Malnutrition Screening Tool Results   Have you  recently lost weight without trying?: Unsure  Have you been eating poorly because of a decreased appetite?: Yes   MST Score: 3   Diagnosis:  Worsening left facial droop, left-sided weakness  History of benign meningioma resected in 2020 followed by Neurosurgery with serial MRI  --with chronic left facial deficits as well as left sided weakness  HLD  Hypertension    Relevant Medical History: Benign meningioma, Carpal tunnel syndrome on right, Cataract, Cervical disc displacement, Cervical spondylosis, Hard of hearing, Hyperlipidemia, Hypertension, Neoplasm, brain, Nephrolithiasis, Neurotrophic keratoconjunctivitis, and Osteoporosis     Scheduled Medications:  atorvastatin, 40 mg, Daily  dexAMETHasone injection, 4 mg, Q6H  diltiaZEM, 180 mg, Daily  erythromycin, , QID  gabapentin, 300 mg, TID  losartan, 100 mg, Daily  metoprolol succinate, 100 mg, Nightly    Continuous Infusions:   PRN Medications:  bisacodyL, 10 mg, Daily PRN  hydrALAZINE, 10 mg, Q4H PRN  labetalol, 20 mg, Q4H PRN  sodium chloride 0.9%, 10 mL, PRN    Calorie Containing IV Medications: no significant kcals from medications at this time    Recent Labs   Lab 06/09/25  1033 06/10/25  0508    140   K 3.3* 3.5   CALCIUM 10.4* 10.0   PHOS  --  4.3   MG  --  1.90    102   CO2 25 25   BUN 15.3 21.2*   CREATININE 0.90 0.77   EGFRNORACEVR >60 >60    141*   BILITOT 0.7 0.4   ALKPHOS 85 78   ALT 22 20   AST 26 21   ALBUMIN 4.2 3.7   TRIG 73  --    WBC 10.92 7.63   HGB 14.6 13.4   HCT 45.0 39.4     Nutrition Orders:  Diet NPO    Appetite/Oral Intake: not applicable/NPO  Factors Affecting Nutritional Intake: chewing difficulty, difficulty/impaired swallowing, and NPO  Social Needs Impacting Access to Food: none identified  Food/Quaker/Cultural Preferences: vanilla or strawberry oral supplement   Food Allergies: none reported  Last Bowel Movement: 06/09/25  Wound(s):      Comments    6/10/25 Reports poor appetite since 6/6/25, with nausea,  "vomiting and increased difficultly chewing/swallowing food. She did have a fair appetite prior to this, oral intake was inconsistent. Left side deficit from resected meningioma sx in 2020, would use right side of mouth for chewing without difficultly up until this past weekend. Family reports UBW ~130lbs in 2020 and has slowly lost unintentional weight. Thinks weight stable ~110lbs x1 year. Admit weight of 99lbs, unsure time frame of 10% weight loss. Will drink vanilla or strawberry ONS once diet resumed. Tube feeding recommendation above if unable to resume oral diet.     Anthropometrics    Height: 5' 2" (157.5 cm),    Last Weight: 45.3 kg (99 lb 13.9 oz) (25 1017), Weight Method: Standard Scale   BMI 18.27kg/m^2  BMI Classification: underweight (BMI less than 18.5)     Ideal Body Weight (IBW), Female: 110 lb                          Usual Body Weight (UBW), k kg  % Usual Body Weight: 90.79     Usual Weight Provided By: patient and family/caregiver    Wt Readings from Last 5 Encounters:   25 45.3 kg (99 lb 13.9 oz)   24 47.3 kg (104 lb 3.2 oz)   24 49.8 kg (109 lb 12.6 oz)   24 49.9 kg (110 lb 0.2 oz)   23 49.9 kg (110 lb)     Weight Change(s) Since Admission:   Wt Readings from Last 1 Encounters:   25 1017 45.3 kg (99 lb 13.9 oz)   Admit Weight: 45.3 kg (99 lb 13.9 oz) (25 1017), Weight Method: Standard Scale    Estimated Needs    Weight Used For Calorie Calculations: 45.3 kg (99 lb 13.9 oz)  Energy Calorie Requirements (kcal): 1585-1812kacls/d (35-40kcals/kg) for weight gain  Energy Need Method: Kcal/kg  Weight Used For Protein Calculations: 45.3 kg (99 lb 13.9 oz)  Protein Requirements: 68g/d (1.5g/kg)   Fluid Requirements (mL): 1359mL fl/d (30ml/kg)        Enteral Nutrition     Patient not receiving enteral nutrition at this time.    Parenteral Nutrition     Patient not receiving parenteral nutrition support at this time.    Evaluation of Received Nutrient " Intake    Calories: not meeting estimated needs  Protein: not meeting estimated needs    Patient Education     Not applicable.    Nutrition Diagnosis     PES: Inadequate energy intake related to inability to consume sufficient nutrients as evidenced by 10% unintentional wt loss, underweight BMI (18.27kg/m^2), muscle and fat loss. (new)     PES: Severe acute disease or injury related malnutrition Related to inability to consume sufficient nutrients  As Evidenced by:  - weight loss: Unable to assess - energy intake: <= 50% for 5 days (meets criteria for <= 50% >= 5 days (severe - acute)) - muscle mass depletion: 2 areas of moderate muscle loss (Temporalis, Clavicle) - loss of subcutaneous fat: 3 areas of mild fat loss (Buccal, Infraorbital, Triceps Skinfold) new    Nutrition Interventions     Intervention(s): collaboration with other providers  Intervention(s): Enteral nutrition;Oral nutritional supplement;Feeding assistance/management;Oral diet/nutrient modifications    Goal: Meet greater than 80% of nutritional needs by follow-up. (new)  Nutrition Goals & Monitoring     Dietitian will monitor: energy intake, weight change, electrolyte/renal panel, beliefs/attitudes, glucose/endocrine profile, and gastrointestinal profile  Discharge planning: too early to determine; pending clinical course  Nutrition Risk/Follow-Up: patient at increased nutrition risk; dietitian will follow-up twice weekly   Please consult if re-assessment needed sooner.

## 2025-06-10 NOTE — PLAN OF CARE
Problem: SLP  Goal: SLP Goal  Description: LTG: The pt will tolerate least restrictive diet with no overt s/sx of aspiration.    STGs:  1. Patient will provide swallow response with delay < 2 seconds.  2. Patient will tolerate puree trials with no signs/sx of aspiration.  3. Patient will participate in repeat MBS.    Outcome: Progressing     MBS completed, goals created

## 2025-06-10 NOTE — SUBJECTIVE & OBJECTIVE
Subjective:     Interval History: No acute events reported overnight. She did fail her swallow study but she and her sister state that this has been a problem at home speech to evaluate today. Repeat CT head completed, official read pending. MRI brain showed residual meningioma at the cerebellopontine angle appears larger than the prior examination and there is some increased surrounding edema. Postsurgical changes in the left temporal fossa and left posterior fossa stable meningioma the centrum semiovale on the right side and an old area of focal hemorrhage or calcification in the posterior cerebral convexity on the right side relatively stable since the prior examination. Patient was started on dexamethasone by Neurosurgery yesterday. She states that she is feeling much more alert today, improved strength of right upper and lower extremity, and HA is now a 2/10.     Current Neurological Medications:     Current Medications[1]    Review of Systems  Objective:     Vital Signs (Most Recent):  Temp: 97.7 °F (36.5 °C) (06/10/25 0801)  Pulse: 97 (06/10/25 0801)  Resp: 16 (06/10/25 0352)  BP: (!) 152/78 (06/10/25 0801)  SpO2: 99 % (06/10/25 0801) Vital Signs (24h Range):  Temp:  [97.5 °F (36.4 °C)-98.4 °F (36.9 °C)] 97.7 °F (36.5 °C)  Pulse:  [78-97] 97  Resp:  [13-18] 16  SpO2:  [95 %-100 %] 99 %  BP: (138-186)/() 152/78     Weight: 45.3 kg (99 lb 13.9 oz)  Body mass index is 18.27 kg/m².     Physical Exam  Vitals reviewed.   HENT:      Head: Normocephalic and atraumatic.      Nose: Nose normal.      Mouth/Throat:      Mouth: Mucous membranes are moist.   Eyes:      Pupils: Pupils are equal, round, and reactive to light.   Pulmonary:      Effort: Pulmonary effort is normal.   Musculoskeletal:         General: Normal range of motion.      Cervical back: Normal range of motion and neck supple.   Skin:     General: Skin is warm and dry.      Capillary Refill: Capillary refill takes less than 2 seconds.    Neurological:      Mental Status: She is alert and oriented to person, place, and time.      Comments: Left facial droop  Sensation intact bilaterally  VFF  Antigravity in all extremities            NEUROLOGICAL EXAMINATION:     MENTAL STATUS   Oriented to person, place, and time.     CRANIAL NERVES     CN III, IV, VI   Pupils are equal, round, and reactive to light.      Significant Labs: All pertinent lab results from the past 24 hours have been reviewed.    Significant Imaging: I have reviewed all pertinent imaging results/findings within the past 24 hours.         [1]   Current Facility-Administered Medications   Medication Dose Route Frequency Provider Last Rate Last Admin    atorvastatin tablet 40 mg  40 mg Oral Daily Marcelino Benedict MD        bisacodyL suppository 10 mg  10 mg Rectal Daily PRN Marcelino Benedict MD        dexAMETHasone injection 4 mg  4 mg Intravenous Q6H Marcelino Benedict MD   4 mg at 06/10/25 0511    diltiaZEM 24 hr capsule 180 mg  180 mg Oral Daily Marcelino Benedict MD        erythromycin 5 mg/gram (0.5 %) ophthalmic ointment   Left Eye QID Marcelino Benedict MD   Given at 06/09/25 2019    gabapentin capsule 300 mg  300 mg Oral TID Marcelino Benedict MD        hydrALAZINE injection 10 mg  10 mg Intravenous Q4H PRN Marcelino Benedict MD        labetaloL injection 20 mg  20 mg Intravenous Q4H PRN Marcelino Benedict MD   20 mg at 06/09/25 2344    losartan tablet 100 mg  100 mg Oral Daily Marcelino Benedict MD        metoprolol succinate (TOPROL-XL) 24 hr tablet 100 mg  100 mg Oral Nightly Marcelino Benedict MD        sodium chloride 0.9% flush 10 mL  10 mL Intravenous PRN Marcelino Benedict MD

## 2025-06-10 NOTE — PLAN OF CARE
Problem: Adult Inpatient Plan of Care  Goal: Plan of Care Review  Outcome: Progressing  Goal: Patient-Specific Goal (Individualized)  Outcome: Progressing  Goal: Absence of Hospital-Acquired Illness or Injury  Outcome: Progressing  Goal: Optimal Comfort and Wellbeing  Outcome: Progressing  Goal: Readiness for Transition of Care  Outcome: Progressing     Problem: Infection  Goal: Absence of Infection Signs and Symptoms  Outcome: Progressing     Problem: Stroke, Ischemic (Includes Transient Ischemic Attack)  Goal: Optimal Coping  Outcome: Progressing  Goal: Effective Bowel Elimination  Outcome: Progressing  Goal: Optimal Cerebral Tissue Perfusion  Outcome: Progressing  Goal: Optimal Cognitive Function  Outcome: Progressing  Goal: Improved Communication Skills  Outcome: Progressing  Goal: Optimal Functional Ability  Outcome: Progressing  Goal: Optimal Nutrition Intake  Outcome: Progressing  Goal: Effective Oxygenation and Ventilation  Outcome: Progressing  Goal: Improved Sensorimotor Function  Outcome: Progressing  Goal: Safe and Effective Swallow  Outcome: Progressing  Goal: Effective Urinary Elimination  Outcome: Progressing     Problem: Wound  Goal: Optimal Coping  Outcome: Progressing  Goal: Optimal Functional Ability  Outcome: Progressing  Goal: Absence of Infection Signs and Symptoms  Outcome: Progressing  Goal: Improved Oral Intake  Outcome: Progressing  Goal: Optimal Pain Control and Function  Outcome: Progressing  Goal: Skin Health and Integrity  Outcome: Progressing  Goal: Optimal Wound Healing  Outcome: Progressing     Problem: Skin Injury Risk Increased  Goal: Skin Health and Integrity  Outcome: Progressing     Problem: Fall Injury Risk  Goal: Absence of Fall and Fall-Related Injury  Outcome: Progressing

## 2025-06-10 NOTE — PROCEDURES
[facility]  Speech Language Pathology Department  Modified Barium Swallow (MBS) Study    Patient Name:  Kaity Cuevas   MRN:  [id number]    Recommendations     General recommendations:  dysphagia therapy  Repeat MBS study: 5-7 days  Solid texture recommendation:  NPO  Liquid consistency recommendation: NPO   Medications: NPO  General precautions: aspiration    History     Kaity Cuevas is a/n 64 y.o. female admitted with L sided weakness and facial droop.      Per pt and family report: diagnosed with meningioma in 2009 with radiation. In 2020, she has near total resection with radiation to follow.      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  07/26/2021    COLONOSCOPY      cyberknife for left petroclival tumor  04/20/2009    EYE SURGERY      HYSTERECTOMY  2009    Left C5-6, C6-7 posterior foraminotomies  08/20/2014    [place]    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     Home diet texture/consistency: Regular and thin liquids  Current Method of Nutrition: NPO    Patient complaint: "it's hard to swallow"    Imaging   Results for orders placed during the hospital encounter of 08/06/23    X-Ray Chest 1 View    Narrative  EXAMINATION:  XR CHEST 1 VIEW    CLINICAL HISTORY:  r/o bleeding or hemorrhage;    TECHNIQUE:  One view    COMPARISON:  CT chest same date    FINDINGS:  Cardiopericardial silhouette is within normal limits. Lungs are without dense focal or segmental consolidation, congestive process, pleural effusions or pneumothorax.    There appears some sclerosis of the left humeral head.    Impression  NO ACUTE CARDIOPULMONARY PROCESS IDENTIFIED.      Electronically signed by: Chago Plummer  Date:    08/06/2023  Time:    20:56    No results found for this or any previous visit.    No results found for this or any previous visit.    Subjective     Patient awake and alert.    Spiritual/Cultural/Presybeterian Beliefs/Practices that affect care: no  Pain/Comfort: Pain Rating 1: 0/10    Restraints/positioning devices: none    Fluoroscopic Findings     Oral Musculature  Dentition: own teeth  Secretion Management: problems swallowing saliva and oral holding  Mucosal Quality: sticky  Facial Movement: reduced left  Oral Labial Strength & Mobility: impaired pursing and impaired seal  Lingual Strength & Mobility: midline sift, however with adequate movement  Volitional Cough: Productive and Weak  Volitional Swallow: reduced    Setup  Upright in bed  Able to self feed  Adequate head control    Visualization  Lateral view    Oral Phase:   Reduced lip closure around utensil/straw  Weak sucking  Severe oral weakness and reduced sensation " creating poor oral control  Moderate difficulties to suck through straw, requiring multiple attempt for success    Pharyngeal Phase:   Swallow delay with spill to the pyriform sinuses  Reduced base of tongue retraction  Reduced epiglottic deflection  Adequate hyolaryngeal excursion    Consistency Fed by Laryngeal Penetration Aspiration Residue   Mildly thick liquid by spoon SLP During the swallow  Cleared with throat clear None Trace   Puree SLP None None Trace   Moderately thick liquid by spoon SLP During the swallow  Cleared with throat clear None Trace   Moderately thick liquid by straw SLP None with very small sip  During the swallow on average sip, throat clear did not clear, cued cough did clear None Trace     Cervical Esophageal Phase:   UES appeared to accommodate all bolus types without stasis or retrograde movement visualized    Compensatory Strategies:  Patient has sensate response to any penetration of laryngeal vestibule and able to clear.     Of note, patient was awake and alert, but randomly fell asleep and required redirection during a trial of moderately thick by straw.    Assessment     Swallow function is negatively impacted by severe oral dysphagia and swallow initiation delay. Laryngeal penetration of all liquids.    SLP rec: strict NPO, with consideration of non-oral nutrition.    Outcome Measures     Functional Oral Intake Scale: 1 - Nothing by mouth    Goals     Multidisciplinary Problems       SLP Goals          Problem: SLP    Goal Priority Disciplines Outcome   SLP Goal     SLP Progressing   Description: LTG: The pt will tolerate least restrictive diet with no overt s/sx of aspiration.    STGs:  1. Patient will provide swallow response with delay < 2 seconds.  2. Patient will tolerate puree trials with no signs/sx of aspiration.  3. Patient will participate in repeat MBS.                       Education     Patient, spouse were, and family were provided with verbal education regarding  swallow function, results/recommendations, and SLP POC.  Understanding was verbalized.    Plan     SLP Follow-Up:  Yes    Patient to be seen:  daily   Plan of Care expires:  06/17/25  Plan of Care reviewed with:  patient, family     Time Tracking     SLP Treatment Date:   06/10/25  Speech Start Time:  1130  Speech Stop Time:  1200     Speech Total Time (min):  30 min    Billable minutes:   Motion Fluoroscopic Evaluation, Video Recording, 15 minutes  Self Care/Home Management, 15 minutes     06/10/2025

## 2025-06-10 NOTE — PLAN OF CARE
Problem: Occupational Therapy  Goal: Occupational Therapy Goal  Description: Goals to be met by: 7/8/25     Patient will increase functional independence with ADLs by performing:    UE Dressing with Modified [place].  LE Dressing with Modified [place].  Grooming while standing with Modified [place].  Toileting from toilet with Modified [place] for hygiene and clothing management.   Toilet transfer to toilet with Modified [place]. Progress from commode as appropriate.     Outcome: Progressing

## 2025-06-10 NOTE — ASSESSMENT & PLAN NOTE
Rule out stroke  Presented with worsening left facial droop, LLE weakness  Etiology: Hemorrhage at the left CP angle with mass effect and cerebral edema    Plan:  Steroids per Neurosurgery  SBP < 150  Neuro checks every 2 hours  PT/OT today  Failed swallow study, awaiting SLP evaluation    Further recommendations per MD.

## 2025-06-10 NOTE — PLAN OF CARE
Problem: Physical Therapy  Goal: Physical Therapy Goal  Description: Goals to be met by: 7/10/2025     Patient will increase functional independence with mobility by performin. Supine to sit with [place]  2. Sit to supine with [place]  3. Sit to stand transfer with [place]  4. Gait  x 100 feet with [place] using No Assistive Device.     Outcome: Progressing

## 2025-06-10 NOTE — PLAN OF CARE
Problem: Adult Inpatient Plan of Care  Goal: Plan of Care Review  Outcome: Progressing     Problem: Adult Inpatient Plan of Care  Goal: Plan of Care Review  Outcome: Progressing  Goal: Patient-Specific Goal (Individualized)  Outcome: Progressing  Goal: Absence of Hospital-Acquired Illness or Injury  Outcome: Progressing  Goal: Optimal Comfort and Wellbeing  Outcome: Progressing  Goal: Readiness for Transition of Care  Outcome: Progressing     Problem: Infection  Goal: Absence of Infection Signs and Symptoms  Outcome: Progressing     Problem: Stroke, Ischemic (Includes Transient Ischemic Attack)  Goal: Optimal Coping  Outcome: Progressing  Goal: Effective Bowel Elimination  Outcome: Progressing  Goal: Optimal Cerebral Tissue Perfusion  Outcome: Progressing  Goal: Optimal Cognitive Function  Outcome: Progressing  Goal: Improved Communication Skills  Outcome: Progressing  Goal: Optimal Functional Ability  Outcome: Progressing  Goal: Optimal Nutrition Intake  Outcome: Progressing  Goal: Effective Oxygenation and Ventilation  Outcome: Progressing  Goal: Improved Sensorimotor Function  Outcome: Progressing  Goal: Safe and Effective Swallow  Outcome: Progressing  Goal: Effective Urinary Elimination  Outcome: Progressing     Problem: Wound  Goal: Optimal Coping  Outcome: Progressing  Goal: Optimal Functional Ability  Outcome: Progressing  Goal: Absence of Infection Signs and Symptoms  Outcome: Progressing  Goal: Improved Oral Intake  Outcome: Progressing  Goal: Optimal Pain Control and Function  Outcome: Progressing  Goal: Skin Health and Integrity  Outcome: Progressing  Goal: Optimal Wound Healing  Outcome: Progressing     Problem: Skin Injury Risk Increased  Goal: Skin Health and Integrity  Outcome: Progressing     Problem: Fall Injury Risk  Goal: Absence of Fall and Fall-Related Injury  Outcome: Progressing

## 2025-06-10 NOTE — PT/OT/SLP EVAL
Physical Therapy Evaluation    Patient Name:  Kaity Cuevas   MRN:  [id number]    Recommendations:     Discharge therapy intensity: High Intensity Therapy   Discharge Equipment Recommendations: to be determined by next level of care   Barriers to discharge: Impaired mobility and Ongoing medical needs    Assessment:     Kaity Cuevas is a 64 y.o. 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  She presents with the following impairments/functional limitations: weakness, impaired endurance, impaired self care skills, impaired functional mobility, gait instability, impaired balance. Pt tolerated session fairly well. Upon first attempt to treat pt BP out of parameters (152/88); time given for nurse to administer meds. Upon second attempt BP at 119/67. Speech present upon entrance;  able to provide a brief history of PLOF. Requires modA for bed mobility and [place] to stand and take side steps. Pt reports she feels unsteady and weak throughout session.  Time limited 2/2 transport taking her for MBS.     Rehab Prognosis: Good; patient would benefit from acute skilled PT services to address these deficits and reach maximum level of function.    Recent Surgery: * No surgery found *      Plan:     During this hospitalization, patient would benefit from acute PT services 6 x/week to address the identified rehab impairments via gait training, therapeutic activities, therapeutic exercises, neuromuscular re-education and progress toward the following goals:    Plan of Care Expires:  07/10/25    Subjective     Chief Complaint: weakness and unsteady   Patient/Family Comments/goals: to get better  Pain/Comfort:  Pain Rating 1: 0/10    Patients cultural, spiritual, Taoist conflicts given the current situation: no    Living Environment:  Pt lives with  in a SLH with 7 steps to enter and a R handrail  Prior to admission,  patients level of function was independent.  Equipment used at home: none.  DME owned (not currently used): rolling walker.  Upon discharge, patient will have assistance from .    Objective:     Communicated with nsg prior to session.  Patient found HOB elevated with telemetry, peripheral IV, pulse ox (continuous)  upon PT entry to room.    General Precautions: Standard, aspiration, fall, SBP<150  Orthopedic Precautions:    Braces: N/A  Respiratory Status: Room air  Blood Pressure: 119/67      Exams:  RLE Strength: WFL  LLE Strength: WFL  Skin integrity: Visible skin intact      Functional Mobility:  Bed Mobility:     Supine to/from Sit: moderate assistance   Transfers:     Sit to Stand:  minimum assistance with hand-held assist  PreGait: pt took side steps x4 HHA minAx2; pt reports feeling unsteady and weak   Sitting Balance: Pt requires Ayala for sitting balance      AM-PAC 6 CLICK MOBILITY  Total Score:15     Co-Treatment: No    Patient, spouse were, and family were provided with verbal education education regarding PT role/goals/POC, fall prevention, safety awareness, and discharge/DME recommendations.  Understanding was verbalized.     Patient left HOB elevated with transport present taking her to [facility].    GOALS:   Multidisciplinary Problems       Physical Therapy Goals          Problem: Physical Therapy    Goal Priority Disciplines Outcome Interventions   Physical Therapy Goal     PT, PT/OT Progressing    Description: Goals to be met by: 7/10/2025     Patient will increase functional independence with mobility by performin. Supine to sit with [place]  2. Sit to supine with [place]  3. Sit to stand transfer with [place]  4. Gait  x 100 feet with [place] using No Assistive Device.                          History:     Past Medical History:   Diagnosis Date    Benign meningioma     Carpal tunnel syndrome on right     Cataract     Cervical disc displacement     Cervical spondylosis      Hard of hearing     Hyperlipidemia     Hypertension     Neoplasm, brain     Nephrolithiasis     Neurotrophic keratoconjunctivitis     Osteoporosis        Past Surgical History:   Procedure Laterality Date    BRAIN SURGERY  06/04/2020    CARPAL TUNNEL RELEASE Right 06/07/2024    Procedure: RELEASE, CARPAL TUNNEL;  Surgeon: Yuriy Stark MD;  Location: [facility] OR;  Service: Orthopedics;  Laterality: Right;    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  07/26/2021    COLONOSCOPY      cyberknife for left petroclival tumor  04/20/2009    EYE SURGERY      HYSTERECTOMY  2009    Left C5-6, C6-7 posterior foraminotomies  08/20/2014    [facility]    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  06/04/2020    [facility]    removal of kidney stone  1995    TRIGGER FINGER RELEASE Right 06/07/2024    Procedure: RELEASE, TRIGGER FINGER; 3rd and 4th finger;  Surgeon: Yuriy Stark MD;  Location: [facility];  Service: Orthopedics;  Laterality: Right;       Time Tracking:     PT Received On: 06/10/25  PT Start Time: 1110     PT Stop Time: 1124  PT Total Time (min): 14 min     Billable Minutes: Evaluation mod      06/10/2025

## 2025-06-10 NOTE — PROGRESS NOTES
[facility] - Neurology  Neurology  Progress Note    Patient Name: Kaity Cuevas  MRN: [id number]  Admission Date: 6/9/2025  Hospital Length of Stay: 1 days  Code Status: Full Code   Attending Provider: Marcelino Benedict MD  Primary Care Physician: Tabby Parker MD   Principal Problem:<principal problem not specified>    HPI:   Kaity Cuevas is a 64 y.o. female with past medical history of hyperlipidemia, hypertension, and benign meningioma of the brain who presented to [facility] on 6/9/2025 with left sided weakness and left facial droop. Per chart, she also reported having trouble eating, swallowing, and felt as though the room was spinning, when attempting to walk.  report left side damage to facial nerve, but he thinks her left facial droop is worse. He also hoted that she was dragging her left leg. Patient is known to Dr. Lagos as she is status post near total resection of left petroclivical meningioma and cyber knife for residual tumor. 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 CTA head and neck without LVO or flow limiting stenosis. MRI brain w/wo pending. Neurosurgery following.       Overview/Hospital Course:  No notes on file        Subjective:     Interval History: No acute events reported overnight. She did fail her swallow study but she and her sister state that this has been a problem at home speech to evaluate today. Repeat CT head completed, official read pending. MRI brain showed residual meningioma at the cerebellopontine angle appears larger than the prior examination and there is some increased surrounding edema. Postsurgical changes in the left temporal fossa and left posterior fossa stable meningioma the centrum semiovale on the right side and an old area of focal hemorrhage or calcification in the  posterior cerebral convexity on the right side relatively stable since the prior examination. Patient was started on dexamethasone by Neurosurgery yesterday. She states that she is feeling much more alert today, improved strength of right upper and lower extremity, and HA is now a 2/10.     Current Neurological Medications:     Current Medications[1]    Review of Systems  Objective:     Vital Signs (Most Recent):  Temp: 97.7 °F (36.5 °C) (06/10/25 0801)  Pulse: 97 (06/10/25 0801)  Resp: 16 (06/10/25 0352)  BP: (!) 152/78 (06/10/25 0801)  SpO2: 99 % (06/10/25 0801) Vital Signs (24h Range):  Temp:  [97.5 °F (36.4 °C)-98.4 °F (36.9 °C)] 97.7 °F (36.5 °C)  Pulse:  [78-97] 97  Resp:  [13-18] 16  SpO2:  [95 %-100 %] 99 %  BP: (138-186)/() 152/78     Weight: 45.3 kg (99 lb 13.9 oz)  Body mass index is 18.27 kg/m².     Physical Exam  Vitals reviewed.   HENT:      Head: Normocephalic and atraumatic.      Nose: Nose normal.      Mouth/Throat:      Mouth: Mucous membranes are moist.   Eyes:      Pupils: Pupils are equal, round, and reactive to light.   Pulmonary:      Effort: Pulmonary effort is normal.   Musculoskeletal:         General: Normal range of motion.      Cervical back: Normal range of motion and neck supple.   Skin:     General: Skin is warm and dry.      Capillary Refill: Capillary refill takes less than 2 seconds.   Neurological:      Mental Status: She is alert and oriented to person, place, and time.      Comments: Left facial droop  Sensation intact bilaterally  VFF  Antigravity in all extremities            NEUROLOGICAL EXAMINATION:     MENTAL STATUS   Oriented to person, place, and time.     CRANIAL NERVES     CN III, IV, VI   Pupils are equal, round, and reactive to light.      Significant Labs: All pertinent lab results from the past 24 hours have been reviewed.    Significant Imaging: I have reviewed all pertinent imaging results/findings within the past 24 hours.    Assessment and Plan:      Stroke  Rule out stroke  Presented with worsening left facial droop, LLE weakness  Etiology: Hemorrhage at the left CP angle with mass effect and cerebral edema    Plan:  Steroids per Neurosurgery  SBP < 150  Neuro checks every 2 hours  PT/OT today  Failed swallow study, awaiting SLP evaluation    Further recommendations per MD.            VTE Risk Mitigation (From admission, onward)           Ordered     IP VTE HIGH RISK PATIENT  Once         06/09/25 1510     Place sequential compression device  Until discontinued         06/09/25 1510                    Carol Son NP  Neurology  [facility] - Neurology       [1]   Current Facility-Administered Medications   Medication Dose Route Frequency Provider Last Rate Last Admin    atorvastatin tablet 40 mg  40 mg Oral Daily Marcelino Benedict MD        bisacodyL suppository 10 mg  10 mg Rectal Daily PRN Marcelino Benedict MD        dexAMETHasone injection 4 mg  4 mg Intravenous Q6H Marcelino Benedict MD   4 mg at 06/10/25 0511    diltiaZEM 24 hr capsule 180 mg  180 mg Oral Daily Marcelino Benedict MD        erythromycin 5 mg/gram (0.5 %) ophthalmic ointment   Left Eye QID Marcelino Benedict MD   Given at 06/09/25 2019    gabapentin capsule 300 mg  300 mg Oral TID Marcelino Benedict MD        hydrALAZINE injection 10 mg  10 mg Intravenous Q4H PRN Marcelino Benedict MD        labetaloL injection 20 mg  20 mg Intravenous Q4H PRN Marcelino Benedict MD   20 mg at 06/09/25 2344    losartan tablet 100 mg  100 mg Oral Daily Marcelino Benedict MD        metoprolol succinate (TOPROL-XL) 24 hr tablet 100 mg  100 mg Oral Nightly Marcelino Benedict MD        sodium chloride 0.9% flush 10 mL  10 mL Intravenous PRN Marcelino Benedict MD

## 2025-06-10 NOTE — PT/OT/SLP EVAL
[facility]  Speech Language Pathology Department  Clinical Swallow Evaluation    Patient Name:  Kaity Cuevas   MRN:  [id number]    Recommendations     General recommendations:  Modified Barium Swallow Study  Solid texture recommendation:  NPO  Liquid consistency recommendation: NPO   Medications: NPO  Precautions: aspiration    History     Kaity Cuevas is a/n 64 y.o. female admitted with L sided weakness and facial droop.     Per pt and family report: diagnosed with meningioma in 2009 with radiation. In 2020, she has near total resection with radiation to follow.     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     Past Medical History:   Diagnosis Date    Benign meningioma     Carpal tunnel syndrome on right     Cataract     Cervical disc displacement     Cervical spondylosis     Hard of hearing     Hyperlipidemia     Hypertension     Neoplasm, brain     Nephrolithiasis     Neurotrophic keratoconjunctivitis     Osteoporosis      Past Surgical History:   Procedure Laterality Date    BRAIN SURGERY  06/04/2020    CARPAL TUNNEL RELEASE Right 06/07/2024    Procedure: RELEASE, CARPAL TUNNEL;  Surgeon: Yuriy Stark MD;  Location: [facility] OR;  Service: Orthopedics;  Laterality: Right;    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  07/26/2021    COLONOSCOPY      cyberknife for left petroclival tumor  04/20/2009    EYE SURGERY      HYSTERECTOMY  2009    Left C5-6, C6-7 posterior foraminotomies  08/20/2014    [facility]    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  06/04/2020    Day    removal of kidney stone  1995    TRIGGER FINGER RELEASE Right 06/07/2024    Procedure: RELEASE, TRIGGER FINGER; 3rd and 4th finger;  Surgeon: Yuriy Stark MD;  Location: [facility] OR;  Service: Orthopedics;  Laterality: Right;     Home  "diet texture/consistency: Regular and thin liquids  Current method of nutrition: NPO    Patient complaint: "it's very hard to swallow"    Imaging   Results for orders placed during the hospital encounter of 08/06/23    X-Ray Chest 1 View    Narrative  EXAMINATION:  XR CHEST 1 VIEW    CLINICAL HISTORY:  r/o bleeding or hemorrhage;    TECHNIQUE:  One view    COMPARISON:  CT chest same date    FINDINGS:  Cardiopericardial silhouette is within normal limits. Lungs are without dense focal or segmental consolidation, congestive process, pleural effusions or pneumothorax.    There appears some sclerosis of the left humeral head.    Impression  NO ACUTE CARDIOPULMONARY PROCESS IDENTIFIED.      Electronically signed by: Chago Plummer  Date:    08/06/2023  Time:    20:56    No results found for this or any previous visit.    No results found for this or any previous visit.    Subjective     Patient awake and alert. Patient with severe L facial droop. Complete numbness of L face. Found with stick secretions in mouth. SLP set up oral suction. Provided oral care using suction and moist toothette swab.     Patient goals: to eat/drink   Spiritual/Cultural/Latter-day Beliefs/Practices that affect care: no    Pain/Comfort: Pain Rating 1: 0/10    Restraints/positioning devices: none    Objective     ORAL MUSCULATURE  Dentition: own teeth  Secretion Management: problems swallowing saliva and oral holding  Mucosal Quality: sticky  Facial Movement: reduced left  Oral Labial Strength & Mobility: impaired pursing and impaired seal  Lingual Strength & Mobility: midline sift, however with adequate movement  Volitional Cough: Productive and Weak  Volitional Swallow: reduced    PO TRIALS  Consistency Fed By Oral Symptoms Pharyngeal Symptoms   Ice chips SLP Slowed oral transit time Reduced laryngeal movement to palpation   Thin liquid by spoon SLP Anterior spillage  Reduced closure around utensil/straw  Slowed oral transit time None   Puree SLP " Reduced closure around utensil/straw  Slowed oral transit time None   Thin liquid by straw SLP Anterior spillage  Reduced closure around utensil/straw  Slowed oral transit time Cough after swallow     Suspected swallow delay.    Assessment     Patient demonstrating signs/sx of aspiration. MBS warranted to further assess swallow.    Education     Patient and family were provided with verbal education regarding risks of aspiration and SLP POC.  Understanding was verbalized.    Plan     SLP Follow-Up:  Yes   Plan of Care reviewed with:  patient     Time Tracking     SLP Treatment Date:   06/10/25  Speech Start Time:  1015  Speech Stop Time:  1030     Speech Total Time (min):  15 min    Billable minutes:  Swallow and Oral Function Evaluation, 15 minutes     06/10/2025

## 2025-06-11 LAB
ALBUMIN SERPL-MCNC: 3.7 G/DL (ref 3.4–4.8)
ALBUMIN/GLOB SERPL: 1 RATIO (ref 1.1–2)
ALP SERPL-CCNC: 75 UNIT/L (ref 40–150)
ALT SERPL-CCNC: 16 UNIT/L (ref 0–55)
ANION GAP SERPL CALC-SCNC: 11 MEQ/L
AST SERPL-CCNC: 17 UNIT/L (ref 11–45)
BASOPHILS # BLD AUTO: 0.02 X10(3)/MCL
BASOPHILS NFR BLD AUTO: 0.1 %
BILIRUB SERPL-MCNC: 0.5 MG/DL
BUN SERPL-MCNC: 35.8 MG/DL (ref 9.8–20.1)
CALCIUM SERPL-MCNC: 9.8 MG/DL (ref 8.4–10.2)
CHLORIDE SERPL-SCNC: 104 MMOL/L (ref 98–107)
CO2 SERPL-SCNC: 25 MMOL/L (ref 23–31)
CREAT SERPL-MCNC: 0.85 MG/DL (ref 0.55–1.02)
CREAT/UREA NIT SERPL: 42
EOSINOPHIL # BLD AUTO: 0 X10(3)/MCL (ref 0–0.9)
EOSINOPHIL NFR BLD AUTO: 0 %
ERYTHROCYTE [DISTWIDTH] IN BLOOD BY AUTOMATED COUNT: 13.1 % (ref 11.5–17)
GFR SERPLBLD CREATININE-BSD FMLA CKD-EPI: >60 ML/MIN/1.73/M2
GLOBULIN SER-MCNC: 3.8 GM/DL (ref 2.4–3.5)
GLUCOSE SERPL-MCNC: 137 MG/DL (ref 82–115)
HCT VFR BLD AUTO: 40.2 % (ref 37–47)
HGB BLD-MCNC: 13.3 G/DL (ref 12–16)
IMM GRANULOCYTES # BLD AUTO: 0.07 X10(3)/MCL (ref 0–0.04)
IMM GRANULOCYTES NFR BLD AUTO: 0.5 %
LEFT CCA DIST DIAS: 5 CM/S
LEFT CCA DIST SYS: 39 CM/S
LEFT CCA PROX DIAS: 13 CM/S
LEFT CCA PROX SYS: 73 CM/S
LEFT ECA DIAS: 6 CM/S
LEFT ECA SYS: 67 CM/S
LEFT ICA DIST DIAS: 19 CM/S
LEFT ICA DIST SYS: 58 CM/S
LEFT ICA MID DIAS: 18 CM/S
LEFT ICA MID SYS: 53 CM/S
LEFT ICA PROX DIAS: 13 CM/S
LEFT ICA PROX SYS: 47 CM/S
LEFT VERTEBRAL DIAS: 11 CM/S
LEFT VERTEBRAL SYS: 55 CM/S
LYMPHOCYTES # BLD AUTO: 0.74 X10(3)/MCL (ref 0.6–4.6)
LYMPHOCYTES NFR BLD AUTO: 4.9 %
MCH RBC QN AUTO: 27.4 PG (ref 27–31)
MCHC RBC AUTO-ENTMCNC: 33.1 G/DL (ref 33–36)
MCV RBC AUTO: 82.9 FL (ref 80–94)
MONOCYTES # BLD AUTO: 1.16 X10(3)/MCL (ref 0.1–1.3)
MONOCYTES NFR BLD AUTO: 7.6 %
NEUTROPHILS # BLD AUTO: 13.25 X10(3)/MCL (ref 2.1–9.2)
NEUTROPHILS NFR BLD AUTO: 86.9 %
NRBC BLD AUTO-RTO: 0 %
OHS CV CAROTID RIGHT ICA EDV HIGHEST: 21
OHS CV CAROTID ULTRASOUND LEFT ICA/CCA RATIO: 1.49
OHS CV CAROTID ULTRASOUND RIGHT ICA/CCA RATIO: 1.27
OHS CV PV CAROTID LEFT HIGHEST CCA: 73
OHS CV PV CAROTID LEFT HIGHEST ICA: 58
OHS CV PV CAROTID RIGHT HIGHEST CCA: 72
OHS CV PV CAROTID RIGHT HIGHEST ICA: 75
OHS CV US CAROTID LEFT HIGHEST EDV: 19
PLATELET # BLD AUTO: 344 X10(3)/MCL (ref 130–400)
PMV BLD AUTO: 9.8 FL (ref 7.4–10.4)
POTASSIUM SERPL-SCNC: 3.6 MMOL/L (ref 3.5–5.1)
PROT SERPL-MCNC: 7.5 GM/DL (ref 5.8–7.6)
RBC # BLD AUTO: 4.85 X10(6)/MCL (ref 4.2–5.4)
RIGHT CCA DIST DIAS: 13 CM/S
RIGHT CCA DIST SYS: 59 CM/S
RIGHT CCA PROX DIAS: 11 CM/S
RIGHT CCA PROX SYS: 72 CM/S
RIGHT ECA DIAS: 7 CM/S
RIGHT ECA SYS: 82 CM/S
RIGHT ICA DIST DIAS: 21 CM/S
RIGHT ICA DIST SYS: 75 CM/S
RIGHT ICA MID DIAS: 16 CM/S
RIGHT ICA MID SYS: 55 CM/S
RIGHT ICA PROX DIAS: 12 CM/S
RIGHT ICA PROX SYS: 47 CM/S
RIGHT VERTEBRAL DIAS: 9 CM/S
RIGHT VERTEBRAL SYS: 43 CM/S
SODIUM SERPL-SCNC: 140 MMOL/L (ref 136–145)
WBC # BLD AUTO: 15.24 X10(3)/MCL (ref 4.5–11.5)

## 2025-06-11 PROCEDURE — 21400001 HC TELEMETRY ROOM

## 2025-06-11 PROCEDURE — 97116 GAIT TRAINING THERAPY: CPT

## 2025-06-11 PROCEDURE — 25000003 PHARM REV CODE 250: Performed by: INTERNAL MEDICINE

## 2025-06-11 PROCEDURE — 63600175 PHARM REV CODE 636 W HCPCS: Performed by: INTERNAL MEDICINE

## 2025-06-11 PROCEDURE — 92526 ORAL FUNCTION THERAPY: CPT

## 2025-06-11 PROCEDURE — 97110 THERAPEUTIC EXERCISES: CPT

## 2025-06-11 PROCEDURE — 36415 COLL VENOUS BLD VENIPUNCTURE: CPT | Performed by: INTERNAL MEDICINE

## 2025-06-11 PROCEDURE — 80053 COMPREHEN METABOLIC PANEL: CPT | Performed by: INTERNAL MEDICINE

## 2025-06-11 PROCEDURE — 85025 COMPLETE CBC W/AUTO DIFF WBC: CPT | Performed by: INTERNAL MEDICINE

## 2025-06-11 PROCEDURE — 99233 SBSQ HOSP IP/OBS HIGH 50: CPT | Mod: ,,, | Performed by: STUDENT IN AN ORGANIZED HEALTH CARE EDUCATION/TRAINING PROGRAM

## 2025-06-11 PROCEDURE — 97535 SELF CARE MNGMENT TRAINING: CPT

## 2025-06-11 RX ADMIN — DEXAMETHASONE SODIUM PHOSPHATE 4 MG: 4 INJECTION, SOLUTION INTRA-ARTICULAR; INTRALESIONAL; INTRAMUSCULAR; INTRAVENOUS; SOFT TISSUE at 06:06

## 2025-06-11 RX ADMIN — GABAPENTIN 300 MG: 300 CAPSULE ORAL at 01:06

## 2025-06-11 RX ADMIN — ATORVASTATIN CALCIUM 40 MG: 40 TABLET, FILM COATED ORAL at 08:06

## 2025-06-11 RX ADMIN — HYDRALAZINE HYDROCHLORIDE 10 MG: 20 INJECTION INTRAMUSCULAR; INTRAVENOUS at 11:06

## 2025-06-11 RX ADMIN — DEXAMETHASONE SODIUM PHOSPHATE 4 MG: 4 INJECTION, SOLUTION INTRA-ARTICULAR; INTRALESIONAL; INTRAMUSCULAR; INTRAVENOUS; SOFT TISSUE at 01:06

## 2025-06-11 RX ADMIN — ERYTHROMYCIN: 5 OINTMENT OPHTHALMIC at 01:06

## 2025-06-11 RX ADMIN — ERYTHROMYCIN: 5 OINTMENT OPHTHALMIC at 05:06

## 2025-06-11 RX ADMIN — DILTIAZEM HYDROCHLORIDE 180 MG: 180 CAPSULE, COATED, EXTENDED RELEASE ORAL at 08:06

## 2025-06-11 RX ADMIN — GABAPENTIN 300 MG: 300 CAPSULE ORAL at 08:06

## 2025-06-11 RX ADMIN — METOPROLOL SUCCINATE 100 MG: 50 TABLET, EXTENDED RELEASE ORAL at 08:06

## 2025-06-11 RX ADMIN — ERYTHROMYCIN: 5 OINTMENT OPHTHALMIC at 08:06

## 2025-06-11 RX ADMIN — LOSARTAN POTASSIUM 100 MG: 50 TABLET, FILM COATED ORAL at 08:06

## 2025-06-11 RX ADMIN — DEXAMETHASONE SODIUM PHOSPHATE 4 MG: 4 INJECTION, SOLUTION INTRA-ARTICULAR; INTRALESIONAL; INTRAMUSCULAR; INTRAVENOUS; SOFT TISSUE at 05:06

## 2025-06-11 RX ADMIN — DEXAMETHASONE SODIUM PHOSPHATE 4 MG: 4 INJECTION, SOLUTION INTRA-ARTICULAR; INTRALESIONAL; INTRAMUSCULAR; INTRAVENOUS; SOFT TISSUE at 12:06

## 2025-06-11 RX ADMIN — DEXAMETHASONE SODIUM PHOSPHATE 4 MG: 4 INJECTION, SOLUTION INTRA-ARTICULAR; INTRALESIONAL; INTRAMUSCULAR; INTRAVENOUS; SOFT TISSUE at 11:06

## 2025-06-11 NOTE — PT/OT/SLP PROGRESS
Occupational Therapy   Treatment    Name: Kaity Cuevas  MRN: [id number]    Recommendations:     Recommended therapy intensity at discharge: High Intensity Therapy   Discharge Equipment Recommendations:  to be determined by next level of care  Barriers to discharge:  Other (Comment) (ongoing medical needs)    Assessment:     Kaity Cuevas is a 64 y.o. 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  She presents with the following performance deficits affecting function are weakness, impaired endurance, impaired self care skills, impaired functional mobility, gait instability, impaired balance, decreased safety awareness, decreased lower extremity function, decreased upper extremity function, impaired coordination.     Rehab Prognosis:  Good; patient would benefit from acute skilled OT services to address these deficits and reach maximum level of function.       Plan:     Patient to be seen 6 x/week to address the above listed problems via self-care/home management, therapeutic activities, therapeutic exercises, neuromuscular re-education  Plan of Care Expires: 07/08/25  Plan of Care Reviewed with: patient, family    Subjective     Pain/Comfort:  Pain Rating 1: 0/10    Objective:     Communicated with: RN prior to session.  Patient found up in chair with NG tube, PureWick, peripheral IV, pulse ox (continuous), telemetry upon OT entry to room.    General Precautions: Standard, fall, aspiration, NPO    Orthopedic Precautions:N/A  Braces: N/A  Respiratory Status: Room air  Vital Signs: Blood Pressure: 151/86  HR: 91     Occupational Performance:     Functional Mobility/Transfers:  Transfers: Sit to Stand: minimum assistance with rolling walker  Toilet Transfer: minimum assistance with rolling walker and grab bars using Step Transfer  Pt completed functional mobility within room ~8ft to toilet then ~20 ft using RW with Mod A. Pt with  left lateral lean, ataxia, and appears with decreased proprioception at LLE as pt with difficulty accurately placing on ground. Very NBOS at times, multiple LOB    Therapeutic Exercise:  Pt completed 2 sets of 10 reps of the following while seated:   Shoulder flex/ext (required assist to achieve full ROM)   Shoulder ab/add   Bicep curls   Chest press     Therapeutic Positioning    OT interventions performed during the course of today's session in an effort to prevent and/or reduce acquired pressure injuries:   Education was provided on benefits of and recommendations for therapeutic positioning    [facility] 6 Click ADL: 18    Co-Treatment: No    Patient Education:  Patient and family were provided with verbal education education regarding OT role/goals/POC, fall prevention, safety awareness, and home exercise program .  Understanding was verbalized, however additional teaching warranted.    Patient left up in chair with all lines intact, call button in reach, and family present.    GOALS:   Multidisciplinary Problems       Occupational Therapy Goals          Problem: Occupational Therapy    Goal Priority Disciplines Outcome Interventions   Occupational Therapy Goal     OT, PT/OT Progressing    Description: Goals to be met by: 7/8/25     Patient will increase functional independence with ADLs by performing:    UE Dressing with Modified [place].  LE Dressing with Modified [place].  Grooming while standing with Modified [place].  Toileting from toilet with Modified [place] for hygiene and clothing management.   Toilet transfer to toilet with Modified [place]. Progress from commode as appropriate.                          Time Tracking:     OT Date of Treatment: 06/11/25  OT Start Time: 1343  OT Stop Time: 1407  OT Total Time (min): 24 min    Billable Minutes:Self Care/Home Management 10 mins  Therapeutic Exercise 14 mins    OT/ELIZABETH: OT     Number of ELIZABETH visits since last OT visit: 1    6/11/2025

## 2025-06-11 NOTE — PT/OT/SLP PROGRESS
Physical Therapy Treatment    Patient Name:  Kaity Cuevas   MRN:  [id number]    Recommendations:     Discharge therapy intensity: High Intensity Therapy   Discharge Equipment Recommendations: to be determined by next level of care  Barriers to discharge: Impaired mobility and Ongoing medical needs    Assessment:     Kaity Cuevas is a 64 y.o. female admitted with a medical diagnosis of  acute left sided weakness, residual meningoma-- increase in cerebellopontine angle mass.  She presents with the following impairments/functional limitations: weakness, impaired endurance, impaired self care skills, impaired functional mobility, gait instability, impaired balance, decreased coordination, impaired coordination.    Rehab Prognosis: Good; patient would benefit from acute skilled PT services to address these deficits and reach maximum level of function.    Recent Surgery: * No surgery found *      Plan:     During this hospitalization, patient would benefit from acute PT services 6 x/week to address the identified rehab impairments via gait training, therapeutic activities, therapeutic exercises and progress toward the following goals:    Plan of Care Expires:  07/10/25    Subjective     Chief Complaint: NA  Patient/Family Comments/goals: return to PLOF  Pain/Comfort:  Pain Rating 1: 0/10      Objective:     Communicated with nsg prior to session.  Patient found supine with telemetry, peripheral IV, PureWick, SCD, pulse ox (continuous) upon PT entry to room.     General Precautions: Standard, aspiration, fall, SBP<160  Orthopedic Precautions:    Braces: N/A  Respiratory Status: Room air  Blood Pressure: 161/87  Skin Integrity: Visible skin intact      Functional Mobility:  Bed Mobility:     Supine to Sit: minimum assistance  Transfers:     Sit to Stand:  minimum assistance with rolling walker  Gait: Pt ambulated 15ft modA with RW. Posterior lean requiring VC/TC to correct. Demonstrated NBOS/tandem gait with  decreased sonia. No LOB but pt unsteady and needed minimum VC for walker management.     Co-Treatment: No    Education:  Patient and family were provided with verbal education education regarding PT role/goals/POC, fall prevention, safety awareness, and discharge/DME recommendations.  Understanding was verbalized.     Patient left up in chair with all lines intact, call button in reach, nsg notified, and family present    GOALS:   Multidisciplinary Problems       Physical Therapy Goals          Problem: Physical Therapy    Goal Priority Disciplines Outcome Interventions   Physical Therapy Goal     PT, PT/OT Progressing    Description: Goals to be met by: 7/10/2025     Patient will increase functional independence with mobility by performin. Supine to sit with Carroll  2. Sit to supine with Carroll  3. Sit to stand transfer with Carroll  4. Gait  x 100 feet with Carroll using No Assistive Device.                          Time Tracking:     PT Received On: 25  PT Start Time: 1013     PT Stop Time: 1039  PT Total Time (min): 26 min     Billable Minutes: Gait Training 2    Treatment Type: Treatment  PT/PTA: PT     Number of PTA visits since last PT visit: 2025

## 2025-06-11 NOTE — PROGRESS NOTES
Hospital Medicine  Progress Note    Patient Name: Kaity Cuevas  MRN: [id number]  Status: IP- Inpatient   Admission Date: 6/9/2025  Length of Stay: 1  Date of Service: 06/10/2025       CC: hospital follow-up for meningioma       SUBJECTIVE   64 year old with a history that includes meningioma, s/p resection 2020, presented to ED 6/9 with worsening left sided weakness.  CT head non-specific findings in the right frontal lobe suspicious for acute infarct; in addition noting interval enlargement of left CP meningioma since 07/18/2024.   Patient was admitted to  services; Neurology and Neurosurgery.  Subsequent MRI ruled out acute CVA, noting residual meningioma at the cerebellopontine angle, larger than the prior examination and with associated surrounding edema. Patient started on IV decadron.  Neurosurgery on board.  Neurology signed off.      Today: Patient seen and examined at bedside, and chart reviewed.  Patient has failed swallow evaluation.  Otherwise stable.      MEDICATIONS   Scheduled   atorvastatin  40 mg Oral Daily    dexAMETHasone injection  4 mg Intravenous Q6H    diltiaZEM  180 mg Oral Daily    erythromycin   Left Eye QID    gabapentin  300 mg Oral TID    losartan  100 mg Oral Daily    metoprolol succinate  100 mg Oral Nightly     Continuous Infusions  None      PHYSICAL EXAM   VITALS: T 97.6 °F (36.4 °C)   BP (!) 169/90   P 108   RR 16   O2 97 %    GENERAL: Awake and in NAD  LUNGS: CTA anteriorly  CVS: Normal rate  GI/: Soft, NT, bowel sounds positive.  EXTREMITIES: No LE edema  NEURO: AAOx3  PSYCH: Cooperative      LABS   CBC  Recent Labs     06/09/25  1033 06/10/25  0508   WBC 10.92 7.63   RBC 5.39 4.89   HGB 14.6 13.4   HCT 45.0 39.4   MCV 83.5 80.6   MCH 27.1 27.4   MCHC 32.4* 34.0   RDW 13.2 13.1    290     CHEM  Recent Labs     06/09/25  1033 06/10/25  0508    140   K 3.3* 3.5   CO2 25 25   BUN 15.3 21.2*   CREATININE 0.90 0.77   CALCIUM 10.4* 10.0   MG  --  1.90   PHOS  --   4.3   ALBUMIN 4.2 3.7   GLOBULIN 4.2* 3.9*   ALKPHOS 85 78   ALT 22 20   AST 26 21   BILITOT 0.7 0.4   TSH 3.194  --          ASSESSMENT   Enlarging CP meningioma with surrounding edema  Worsening left sided deficits  OP dysphagia s/t above    PLAN   Continue with IV decadron  Appreciate NSGY input  Will place NGT for nutrition and meds  Continue ST, monitor for progress  Continue PT/OT as well  Otherwise continue current management and monitoring in the interim        Prophylaxis: SCDs        Marcelino Benedict MD  [facility]

## 2025-06-11 NOTE — PLAN OF CARE
Problem: Adult Inpatient Plan of Care  Goal: Plan of Care Review  Outcome: Not Progressing  Goal: Patient-Specific Goal (Individualized)  Outcome: Not Progressing  Goal: Absence of Hospital-Acquired Illness or Injury  Outcome: Not Progressing  Goal: Optimal Comfort and Wellbeing  Outcome: Not Progressing  Goal: Readiness for Transition of Care  Outcome: Not Progressing     Problem: Infection  Goal: Absence of Infection Signs and Symptoms  Outcome: Not Progressing     Problem: Stroke, Ischemic (Includes Transient Ischemic Attack)  Goal: Optimal Coping  Outcome: Not Progressing  Goal: Effective Bowel Elimination  Outcome: Not Progressing

## 2025-06-11 NOTE — PROGRESS NOTES
Hospital Medicine  Progress Note    Patient Name: Kaity Cuevas  MRN: [id number]  Status: IP- Inpatient   Admission Date: 6/9/2025  Length of Stay: 2  Date of Service: 06/11/2025       CC: hospital follow-up for meningioma       SUBJECTIVE   64 year old with a history that includes meningioma, s/p resection 2020, presented to ED 6/9 with worsening left sided weakness.  CT head non-specific findings in the right frontal lobe suspicious for acute infarct; in addition noting interval enlargement of left CP meningioma since 07/18/2024.   Patient was admitted to  services; Neurology and Neurosurgery.  Subsequent MRI ruled out acute CVA, noting residual meningioma at the cerebellopontine angle, larger than the prior examination and with associated surrounding edema. Patient started on IV decadron.  Neurosurgery on board.  Neurology signed off.      Today: Patient seen and examined at bedside, and chart reviewed.  Patient has failed swallow evaluation.  Otherwise stable.      MEDICATIONS   Scheduled   atorvastatin  40 mg Oral Daily    dexAMETHasone injection  4 mg Intravenous Q6H    diltiaZEM  180 mg Oral Daily    erythromycin   Left Eye QID    gabapentin  300 mg Oral TID    losartan  100 mg Oral Daily    metoprolol succinate  100 mg Oral Nightly     Continuous Infusions  None      PHYSICAL EXAM   VITALS: T 97.4 °F (36.3 °C)   BP (!) 162/87   P 87   RR 16   O2 95 %    GENERAL: Awake and in NAD  LUNGS: CTA anteriorly  CVS: Normal rate  GI/: Soft, NT, bowel sounds positive.  EXTREMITIES: No LE edema  NEURO: AAOx3  PSYCH: Cooperative      LABS   CBC  Recent Labs     06/10/25  0508 06/11/25  0345   WBC 7.63 15.24*   RBC 4.89 4.85   HGB 13.4 13.3   HCT 39.4 40.2   MCV 80.6 82.9   MCH 27.4 27.4   MCHC 34.0 33.1   RDW 13.1 13.1    344     CHEM  Recent Labs     06/09/25  1033 06/10/25  0508 06/11/25  0345    140 140   K 3.3* 3.5 3.6   CO2 25 25 25   BUN 15.3 21.2* 35.8*   CREATININE 0.90 0.77 0.85   CALCIUM  10.4* 10.0 9.8   MG  --  1.90  --    PHOS  --  4.3  --    ALBUMIN 4.2 3.7 3.7   GLOBULIN 4.2* 3.9* 3.8*   ALKPHOS 85 78 75   ALT 22 20 16   AST 26 21 17   BILITOT 0.7 0.4 0.5   TSH 3.194  --   --          ASSESSMENT   Enlarging CP meningioma with surrounding edema  Worsening left sided deficits  OP dysphagia s/t above    PLAN   Continue with IV decadron, appreciate NSGY input  Continue to titrate NG tube feeds to goal  Continue ST, repeat swallow evaluation in next 5-7 days  Continue PT/OT as well  Otherwise continue current management and monitoring in the interim        Prophylaxis: SCDs        Marcelino Benedict MD  [facility]

## 2025-06-11 NOTE — PT/OT/SLP PROGRESS
[facility]  Speech Language Pathology Department  Dysphagia Therapy Progress Note    Patient Name:  Kaity Cuevas   MRN:  [id number]    Recommendations     General recommendations:  dysphagia therapy  Solid texture recommendation:  NPO  Liquid consistency recommendation: NPO   Medications: NPO    Discharge therapy intensity: High Intensity Therapy   Barriers to safe discharge:  acuity of illness and severity of impairment    Subjective     Patient awake and alert.  Spiritual/Cultural/Anglican Beliefs/Practices that affect care: no    Pain/Comfort: Pain Rating 1: 0/10    Objective     Oral Musculature  Oral care provided.    Therapeutic Activities:  Pt tolerated thermal stimulation to the anterior faucial pillars x10 (puree trials care home through) with 100% swallow responses. Delay 5-8 seconds.    Therapeutic PO Trials:  Consistency Amount Fed By Oral Symptoms Pharyngeal Symptoms   Puree x4 SLP Slowed oral transit time Swallow delay     Assessment     Pt continues to present with oropharyngeal dysphagia and remains unsafe for PO diet.    Outcome Measures     Functional Oral Intake Scale: 1 - Nothing by mouth    Goals     Multidisciplinary Problems       SLP Goals          Problem: SLP    Goal Priority Disciplines Outcome   SLP Goal     SLP Progressing   Description: LTG: The pt will tolerate least restrictive diet with no overt s/sx of aspiration.    STGs:  1. Patient will provide swallow response with delay < 2 seconds.  2. Patient will tolerate puree trials with no signs/sx of aspiration.  3. Patient will participate in repeat MBS.                       Patient Education     Patient and family were provided with verbal education regarding SLP POC.  Understanding was verbalized.    Plan     Will continue to follow and tx as appropriate.    SLP Follow-Up:  Yes   Patient to be seen:  daily   Plan of Care expires:  06/17/25  Plan of Care reviewed with:  patient, family       Time  Tracking     SLP Treatment Date:   06/11/25  Speech Start Time:  1210  Speech Stop Time:  1230     Speech Total Time (min):  20 min    Billable minutes:  Treatment of Swallow Dysfunction, 20 minutes       06/11/2025

## 2025-06-11 NOTE — PROGRESS NOTES
Inpatient Nutrition Assessment    Admit Date: 6/9/2025   Total duration of encounter: 2 days   Patient Age: 64 y.o.    Nutrition Recommendation/Prescription     Continue NPO until cleared for oral diet. Texture modifications per SLP.     Advance tube feedings 10mL q8hr as tolerated to goal rate.   Goal tube feeding recommendations   Isosource HN goal rate 55mL/hr + 50mL q2hr water flush will provide  1320kcals/d (83% est needs)  59g protein/d (88% est needs)  1391mL fl/d (102% est needs)  (calculations based on estimated 20 hr/d run time)     Monitor and replace electrolytes as need.     Communication of Recommendations: reviewed with nurse, reviewed with patient, and reviewed with family    Nutrition Assessment     Malnutrition Assessment/Nutrition-Focused Physical Exam    Malnutrition Context: acute illness or injury (06/10/25 1043)  Malnutrition Level: severe (06/10/25 1043)  Energy Intake (Malnutrition): less than or equal to 50% for greater than or equal to 5 days (06/10/25 1043)  Weight Loss (Malnutrition): other (see comments) (Unable to assess) (06/10/25 1043)  Subcutaneous Fat (Malnutrition): mild depletion (06/10/25 1043)  Orbital Region (Subcutaneous Fat Loss): mild depletion  Upper Arm Region (Subcutaneous Fat Loss): mild depletion     Muscle Mass (Malnutrition): moderate depletion (06/10/25 1043)  Druze Region (Muscle Loss): moderate depletion  Clavicle Bone Region (Muscle Loss): moderate depletion                    Fluid Accumulation (Malnutrition): other (see comments) (Not present) (06/10/25 1043)        A minimum of two characteristics is recommended for diagnosis of either severe or non-severe malnutrition.    Chart Review    Reason Seen: malnutrition screening tool (MST) and follow-up    Malnutrition Screening Tool Results   Have you recently lost weight without trying?: Unsure  Have you been eating poorly because of a decreased appetite?: Yes   MST Score: 3   Diagnosis:  Worsening left facial  droop, left-sided weakness  History of benign meningioma resected in 2020 followed by Neurosurgery with serial MRI  --with chronic left facial deficits as well as left sided weakness  HLD  Hypertension    Relevant Medical History: Benign meningioma, Carpal tunnel syndrome on right, Cataract, Cervical disc displacement, Cervical spondylosis, Hard of hearing, Hyperlipidemia, Hypertension, Neoplasm, brain, Nephrolithiasis, Neurotrophic keratoconjunctivitis, and Osteoporosis     Scheduled Medications:  atorvastatin, 40 mg, Daily  dexAMETHasone injection, 4 mg, Q6H  diltiaZEM, 180 mg, Daily  erythromycin, , QID  gabapentin, 300 mg, TID  losartan, 100 mg, Daily  metoprolol succinate, 100 mg, Nightly    Continuous Infusions:   PRN Medications:  bisacodyL, 10 mg, Daily PRN  hydrALAZINE, 10 mg, Q4H PRN  labetalol, 20 mg, Q4H PRN  sodium chloride 0.9%, 10 mL, PRN    Calorie Containing IV Medications: no significant kcals from medications at this time    Recent Labs   Lab 06/09/25  1033 06/10/25  0508 06/11/25  0345    140 140   K 3.3* 3.5 3.6   CALCIUM 10.4* 10.0 9.8   PHOS  --  4.3  --    MG  --  1.90  --     102 104   CO2 25 25 25   BUN 15.3 21.2* 35.8*   CREATININE 0.90 0.77 0.85   EGFRNORACEVR >60 >60 >60    141* 137*   BILITOT 0.7 0.4 0.5   ALKPHOS 85 78 75   ALT 22 20 16   AST 26 21 17   ALBUMIN 4.2 3.7 3.7   TRIG 73  --   --    WBC 10.92 7.63 15.24*   HGB 14.6 13.4 13.3   HCT 45.0 39.4 40.2     Nutrition Orders:  Diet NPO  Tube Feedings/Formulas 55; 1,100; Isosource HN; NG; 50; Every 2 hours  Appetite/Oral Intake: not applicable/NPO  Factors Affecting Nutritional Intake: chewing difficulty, difficulty/impaired swallowing, and NPO  Social Needs Impacting Access to Food: none identified  Food/Synagogue/Cultural Preferences: vanilla or strawberry oral supplement   Food Allergies: none reported  Last Bowel Movement: 06/09/25  Wound(s):      Comments    6/10/25 Reports poor appetite since 6/6/25, with  "nausea, vomiting and increased difficultly chewing/swallowing food. She did have a fair appetite prior to this, oral intake was inconsistent. Left side deficit from resected meningioma sx in , would use right side of mouth for chewing without difficultly up until this past weekend. Family reports UBW ~130lbs in 2020 and has slowly lost unintentional weight. Thinks weight stable ~110lbs x1 year. Admit weight of 99lbs, unsure time frame of 10% weight loss. Will drink vanilla or strawberry ONS once diet resumed. Tube feeding recommendation above if unable to resume oral diet.     25 Tolerating tube feedings at 35mL/hr this morning. Denies any GI complaints.     Anthropometrics    Height: 5' 2" (157.5 cm),    Last Weight: 45.3 kg (99 lb 13.9 oz) (25 1017), Weight Method: Standard Scale   BMI 18.27kg/m^2  BMI Classification: underweight (BMI less than 18.5)     Ideal Body Weight (IBW), Female: 110 lb                          Usual Body Weight (UBW), k kg  % Usual Body Weight: 90.79     Usual Weight Provided By: patient and family/caregiver    Wt Readings from Last 5 Encounters:   25 45.3 kg (99 lb 13.9 oz)   24 47.3 kg (104 lb 3.2 oz)   24 49.8 kg (109 lb 12.6 oz)   24 49.9 kg (110 lb 0.2 oz)   23 49.9 kg (110 lb)     Weight Change(s) Since Admission:   Wt Readings from Last 1 Encounters:   25 1017 45.3 kg (99 lb 13.9 oz)   Admit Weight: 45.3 kg (99 lb 13.9 oz) (25 1017), Weight Method: Standard Scale    Estimated Needs    Weight Used For Calorie Calculations: 45.3 kg (99 lb 13.9 oz)  Energy Calorie Requirements (kcal): 1585-1812kacls/d (35-40kcals/kg) for weight gain  Energy Need Method: Kcal/kg  Weight Used For Protein Calculations: 45.3 kg (99 lb 13.9 oz)  Protein Requirements: 68g/d (1.5g/kg)   Fluid Requirements (mL): 1359mL fl/d (30ml/kg)        Enteral Nutrition     Formula: Isosource HN  Rate/Volume: 35mL/hr  Water Flushes: 50mL q2hr " (500mL/d)  Additives/Modulars: none at this time  Route: nasogastric tube  Method: continuous  Total Nutrition Provided by Tube Feeding, Additives, and Flushes:  Calories Provided  840 kcal/d, 53% needs   Protein Provided  38 g/d, 56% needs   Fluid Provided  1067 ml/d, 79% needs   Continuous feeding calculations based on estimated 20 hr/d run time unless otherwise stated.    Parenteral Nutrition     Patient not receiving parenteral nutrition support at this time.    Evaluation of Received Nutrient Intake    Calories: not meeting estimated needs  Protein: not meeting estimated needs    Patient Education     Not applicable.    Nutrition Diagnosis     PES: Inadequate energy intake related to inability to consume sufficient nutrients as evidenced by likely <50% EER since 6/6/25. (active)     PES: Severe acute disease or injury related malnutrition Related to inability to consume sufficient nutrients  As Evidenced by:  - weight loss: Unable to assess - energy intake: <= 50% for 5 days (meets criteria for <= 50% >= 5 days (severe - acute)) - muscle mass depletion: 2 areas of moderate muscle loss (Temporalis, Clavicle) - loss of subcutaneous fat: 3 areas of mild fat loss (Buccal, Infraorbital, Triceps Skinfold) new    Nutrition Interventions     Intervention(s): collaboration with other providers  Intervention(s): Enteral nutrition;Oral nutritional supplement;Feeding assistance/management;Oral diet/nutrient modifications    Goal: Meet greater than 80% of nutritional needs by follow-up. (goal progressing)  Nutrition Goals & Monitoring     Dietitian will monitor: energy intake, enteral nutrition intake, weight change, electrolyte/renal panel, beliefs/attitudes, glucose/endocrine profile, and gastrointestinal profile  Discharge planning: too early to determine; pending clinical course  Nutrition Risk/Follow-Up: patient at increased nutrition risk; dietitian will follow-up twice weekly   Please consult if re-assessment needed  sooner.

## 2025-06-11 NOTE — PROGRESS NOTES
[facility] - Neurology  Neurosurgery  Progress Note    Subjective:     Interval History:   F/U: Left middle cerebellar parenchymal hemorrhage, hx of meningioma  Patient failed swallow study yesterday. NGT was placed and patient started on tubefeeds. She continues to have left facial droop and left sided weakness. She worked with PT and OT who are both recommending high intensity therapy.     Post-Op Info:  * No surgery found *          Medications:  Continuous Infusions:  Scheduled Meds:   atorvastatin  40 mg Oral Daily    dexAMETHasone injection  4 mg Intravenous Q6H    diltiaZEM  180 mg Oral Daily    erythromycin   Left Eye QID    gabapentin  300 mg Oral TID    losartan  100 mg Oral Daily    metoprolol succinate  100 mg Oral Nightly     PRN Meds:  Current Facility-Administered Medications:     bisacodyL, 10 mg, Rectal, Daily PRN    hydrALAZINE, 10 mg, Intravenous, Q4H PRN    labetalol, 20 mg, Intravenous, Q4H PRN    sodium chloride 0.9%, 10 mL, Intravenous, PRN     Review of Systems  Objective:     Weight: 45.3 kg (99 lb 13.9 oz)  Body mass index is 18.27 kg/m².  Vital Signs (Most Recent):  Temp: 97.4 °F (36.3 °C) (06/11/25 0809)  Pulse: 92 (06/11/25 0742)  Resp: 16 (06/10/25 2300)  BP: (!) 168/85 (06/11/25 0818)  SpO2: 95 % (06/11/25 0450) Vital Signs (24h Range):  Temp:  [97.4 °F (36.3 °C)-98.5 °F (36.9 °C)] 97.4 °F (36.3 °C)  Pulse:  [] 92  Resp:  [16] 16  SpO2:  [95 %-98 %] 95 %  BP: (119-169)/(67-90) 168/85                              NG/OG Tube 06/10/25 1513 Sisters sump 16 Fr. Right nostril (Active)   Placement Check placement verified by x-ray 06/10/25 2000   Tube advanced (cm) 63 06/10/25 2000   Tolerance no signs/symptoms of discomfort 06/10/25 2000   Securement secured to nostril center w/ adhesive device 06/10/25 2000   Clamp Status/Tolerance no abdominal discomfort;no emesis;no abdominal distention;no nausea 06/10/25 2000   Feeding Type by pump 06/10/25 2000   Feeding Action  feeding continued 06/10/25 2000   Current Rate (mL/hr) 15 mL/hr 06/10/25 2000   Goal Rate (mL/hr) 55 mL/hr 06/10/25 2000   Intake (mL) 60 mL 06/10/25 2000   Water Bolus (mL) 60 mL 06/10/25 2000   Rate Formula Tube Feeding (mL/hr) 15 mL/hr 06/10/25 2000   Formula Name Isosource 06/10/25 2000       Neurosurgery Physical Exam  Awake and alert  Responds appropriately to questions and able to converse in conversations  PERRLA, EOMI  Dry left eye due to being unable to follow close left eyelid  Left facial droop  Left sided weakness  Full strength of the right side  NGT in place and receiving tubefeeds      Significant Labs:  Recent Labs   Lab 06/09/25  1033 06/10/25  0508 06/11/25  0345    141* 137*    140 140   K 3.3* 3.5 3.6    102 104   CO2 25 25 25   BUN 15.3 21.2* 35.8*   CREATININE 0.90 0.77 0.85   CALCIUM 10.4* 10.0 9.8   MG  --  1.90  --      Recent Labs   Lab 06/09/25  1033 06/10/25  0508 06/11/25  0345   WBC 10.92 7.63 15.24*   HGB 14.6 13.4 13.3   HCT 45.0 39.4 40.2    290 344     Recent Labs   Lab 06/09/25  1033 06/10/25  0508   INR 0.9 1.0   APTT 28.9 29.3     Microbiology Results (last 7 days)       ** No results found for the last 168 hours. **            Significant Diagnostics:      Assessment/Plan:    Dx: Left middle cerebellar parenchymal hemorrhage, hx of meningioma      Plan:  - Floor status. Primary team is hospitalist.  - Neuro checks Q4H  - SBP <160/90  - Continue decadron 4mg Q6H x 2 days, 2mg Q8H x 2 days, 2mg BID x 2 days, then continue 2mg daily till seen in clinic  - Erythromycin ointment for dry eye  - HOB 30 degrees  - Continue NGT for tubefeeds, but will likely need PEG tube placement to be discharge for rehab  - Continue PT, OT, SLP  - SCDs for DVT prophylaxis  - Continue to hold all antiplatelets and anticoagulants for now  - Case management onboard working on placement  - Follow up in neurosurgery clinic in 4-6 weeks with CT head without contrast and MRI brain  with without contrast with SALEEM.     Active Diagnoses:    Diagnosis Date Noted POA    Severe malnutrition [E43] 06/10/2025 Yes    Stroke [I63.9] 06/09/2025 Yes      Problems Resolved During this Admission:       LARISA Parsons-BC  Neurosurgery  [facility] [place] [facility]

## 2025-06-12 LAB — POCT GLUCOSE: 150 MG/DL (ref 70–110)

## 2025-06-12 PROCEDURE — 25000003 PHARM REV CODE 250

## 2025-06-12 PROCEDURE — 21400001 HC TELEMETRY ROOM

## 2025-06-12 PROCEDURE — 25000003 PHARM REV CODE 250: Performed by: INTERNAL MEDICINE

## 2025-06-12 PROCEDURE — 97535 SELF CARE MNGMENT TRAINING: CPT | Mod: CO

## 2025-06-12 PROCEDURE — 63600175 PHARM REV CODE 636 W HCPCS: Performed by: INTERNAL MEDICINE

## 2025-06-12 PROCEDURE — 97530 THERAPEUTIC ACTIVITIES: CPT | Mod: CO

## 2025-06-12 PROCEDURE — 63600175 PHARM REV CODE 636 W HCPCS

## 2025-06-12 PROCEDURE — 92526 ORAL FUNCTION THERAPY: CPT

## 2025-06-12 PROCEDURE — 97116 GAIT TRAINING THERAPY: CPT

## 2025-06-12 PROCEDURE — 97112 NEUROMUSCULAR REEDUCATION: CPT

## 2025-06-12 RX ORDER — PANTOPRAZOLE SODIUM 40 MG/10ML
40 INJECTION, POWDER, LYOPHILIZED, FOR SOLUTION INTRAVENOUS DAILY
Status: DISCONTINUED | OUTPATIENT
Start: 2025-06-12 | End: 2025-06-20 | Stop reason: HOSPADM

## 2025-06-12 RX ORDER — CARVEDILOL 3.12 MG/1
6.25 TABLET ORAL 2 TIMES DAILY
Status: DISCONTINUED | OUTPATIENT
Start: 2025-06-12 | End: 2025-06-14

## 2025-06-12 RX ORDER — DEXAMETHASONE SODIUM PHOSPHATE 4 MG/ML
4 INJECTION, SOLUTION INTRA-ARTICULAR; INTRALESIONAL; INTRAMUSCULAR; INTRAVENOUS; SOFT TISSUE EVERY 8 HOURS
Status: DISCONTINUED | OUTPATIENT
Start: 2025-06-12 | End: 2025-06-14

## 2025-06-12 RX ORDER — POLYETHYLENE GLYCOL 3350 17 G/17G
17 POWDER, FOR SOLUTION ORAL DAILY
Status: DISCONTINUED | OUTPATIENT
Start: 2025-06-12 | End: 2025-06-14

## 2025-06-12 RX ADMIN — POLYETHYLENE GLYCOL 3350 17 G: 17 POWDER, FOR SOLUTION ORAL at 03:06

## 2025-06-12 RX ADMIN — ERYTHROMYCIN: 5 OINTMENT OPHTHALMIC at 08:06

## 2025-06-12 RX ADMIN — LABETALOL HYDROCHLORIDE 20 MG: 5 INJECTION, SOLUTION INTRAVENOUS at 11:06

## 2025-06-12 RX ADMIN — DEXAMETHASONE SODIUM PHOSPHATE 4 MG: 4 INJECTION, SOLUTION INTRA-ARTICULAR; INTRALESIONAL; INTRAMUSCULAR; INTRAVENOUS; SOFT TISSUE at 11:06

## 2025-06-12 RX ADMIN — ERYTHROMYCIN: 5 OINTMENT OPHTHALMIC at 03:06

## 2025-06-12 RX ADMIN — DEXAMETHASONE SODIUM PHOSPHATE 4 MG: 4 INJECTION, SOLUTION INTRA-ARTICULAR; INTRALESIONAL; INTRAMUSCULAR; INTRAVENOUS; SOFT TISSUE at 09:06

## 2025-06-12 RX ADMIN — DEXAMETHASONE SODIUM PHOSPHATE 4 MG: 4 INJECTION, SOLUTION INTRA-ARTICULAR; INTRALESIONAL; INTRAMUSCULAR; INTRAVENOUS; SOFT TISSUE at 12:06

## 2025-06-12 RX ADMIN — CARVEDILOL 6.25 MG: 3.12 TABLET, FILM COATED ORAL at 03:06

## 2025-06-12 RX ADMIN — ATORVASTATIN CALCIUM 40 MG: 40 TABLET, FILM COATED ORAL at 08:06

## 2025-06-12 RX ADMIN — GABAPENTIN 300 MG: 300 CAPSULE ORAL at 08:06

## 2025-06-12 RX ADMIN — PANTOPRAZOLE SODIUM 40 MG: 40 INJECTION, POWDER, FOR SOLUTION INTRAVENOUS at 03:06

## 2025-06-12 RX ADMIN — CARVEDILOL 6.25 MG: 3.12 TABLET, FILM COATED ORAL at 08:06

## 2025-06-12 RX ADMIN — LOSARTAN POTASSIUM 100 MG: 50 TABLET, FILM COATED ORAL at 08:06

## 2025-06-12 RX ADMIN — DEXAMETHASONE SODIUM PHOSPHATE 4 MG: 4 INJECTION, SOLUTION INTRA-ARTICULAR; INTRALESIONAL; INTRAMUSCULAR; INTRAVENOUS; SOFT TISSUE at 05:06

## 2025-06-12 RX ADMIN — DILTIAZEM HYDROCHLORIDE 180 MG: 180 CAPSULE, COATED, EXTENDED RELEASE ORAL at 08:06

## 2025-06-12 RX ADMIN — GABAPENTIN 300 MG: 300 CAPSULE ORAL at 03:06

## 2025-06-12 NOTE — PROGRESS NOTES
[facility]  Hospital Medicine Progress Note        Chief Complaint: Inpatient Follow-up for     HPI:   64 year old with a history that includes meningioma, s/p resection 2020 and radiation x 2 (2009 and 2021), presented to ED 6/9 with worsening left sided weakness.  CT head non-specific findings in the right frontal lobe suspicious for acute infarct; in addition noting interval enlargement of left CP meningioma since 07/18/2024.   Patient was admitted to  services; Neurology and Neurosurgery.  Subsequent MRI ruled out acute CVA, noting residual meningioma at the cerebellopontine angle, larger than the prior examination and with associated surrounding edema. Patient started on IV decadron.  Neurosurgery on board.  Neurology signed off.      Patient failed swallow. Will consult GI for PEG tube. Continue steroid taper as per neurosurgery.     Interval Hx:   Seen and examined. NG tube in place. Sister at bedside. All questions answered.     Case was discussed with patient's nurse and  on the floor.    Objective/physical exam:  General: In no acute distress, afebrile  Chest: Clear to auscultation bilaterally  Heart: RRR, +S1, S2, no appreciable murmur  Abdomen: Soft, nontender, BS +  MSK: Warm, no lower extremity edema, no clubbing or cyanosis  Neurologic: Alert and oriented x4, Cranial nerve II-XII intact, Strength 5/5 in all 4 extremities    VITAL SIGNS: 24 HRS MIN & MAX LAST   Temp  Min: 97.2 °F (36.2 °C)  Max: 98.3 °F (36.8 °C) 97.5 °F (36.4 °C)   BP  Min: 134/70  Max: 173/85 (!) 159/84   Pulse  Min: 76  Max: 88  83   Resp  Min: 16  Max: 18 16   SpO2  Min: 95 %  Max: 98 % 98 %     I have reviewed the following labs:  Recent Labs   Lab 06/09/25  1033 06/10/25  0508 06/11/25  0345   WBC 10.92 7.63 15.24*   RBC 5.39 4.89 4.85   HGB 14.6 13.4 13.3   HCT 45.0 39.4 40.2   MCV 83.5 80.6 82.9   MCH 27.1 27.4 27.4   MCHC 32.4* 34.0 33.1   RDW 13.2 13.1 13.1    290 344   MPV  9.8 9.5 9.8     Recent Labs   Lab 06/09/25  1033 06/10/25  0508 06/11/25  0345    140 140   K 3.3* 3.5 3.6    102 104   CO2 25 25 25   BUN 15.3 21.2* 35.8*   CREATININE 0.90 0.77 0.85    141* 137*   CALCIUM 10.4* 10.0 9.8   MG  --  1.90  --    ALBUMIN 4.2 3.7 3.7   PROT 8.4* 7.6 7.5   ALKPHOS 85 78 75   ALT 22 20 16   AST 26 21 17   BILITOT 0.7 0.4 0.5     Microbiology Results (last 7 days)       ** No results found for the last 168 hours. **             See below for Radiology    Assessment/Plan:  # Residual meningioma at the cerebellopontine angle appears larger than the prior examination and there is some increased surrounding edema.  # Old area of focal hemorrhage or calcification in the posterior cerebral convexity on the right side  # Hx of meningioma, s/p resection 2020 and radiation x 2 (2009 and 2021)  # HTN  # Dysphagia 2/2 meningioma  # Steroid induced leukocytosis    - neurology and neurosurgery recommendations appreciated   - plan to repeat scans in 6 weeks to determine if additional radiation is needed   - Continue decadron 4mg Q6H x 2 days, 2mg Q8H x 2 days, 2mg BID x 2 days, then continue 2mg daily till seen in clinic - will switch to 4mg q8hr today 6/12  - consult GI for PEG tube. I spoke with Dr. Lagos; he thinks low probability of obtaining swallowing function with the steroids  - IV PPI while on steroids  - continue diltiazem and losartan for BP. Will switch metop XL to coreg to help with BP control  - PT/OT/SLP  - aspiration precautions    VTE prophylaxis: SCD    Patient condition:  Guarded    Anticipated discharge and Disposition:         All diagnosis and differential diagnosis have been reviewed; assessment and plan has been documented; I have personally reviewed the labs and test results that are presently available; I have reviewed the patients medication list; I have reviewed the consulting providers response and recommendations. I have reviewed or attempted to review  medical records based upon their availability    All of the patient's questions have been  addressed and answered. Patient's is agreeable to the above stated plan. I will continue to monitor closely and make adjustments to medical management as needed.    _____________________________________________________________________    Malnutrition Status:  Nutrition consulted. Most recent weight and BMI monitored-     Measurements:  Wt Readings from Last 1 Encounters:   06/09/25 45.3 kg (99 lb 13.9 oz)   Body mass index is 18.27 kg/m².    Patient has been screened and assessed by RD.    Malnutrition Type:  Context: acute illness or injury  Level: severe    Malnutrition Characteristic Summary:  Weight Loss (Malnutrition): other (see comments) (Unable to assess)  Energy Intake (Malnutrition): less than or equal to 50% for greater than or equal to 5 days  Subcutaneous Fat (Malnutrition): mild depletion  Muscle Mass (Malnutrition): moderate depletion  Fluid Accumulation (Malnutrition): other (see comments) (Not present)    Interventions/Recommendations (treatment strategy):  Enteral nutrition;Oral nutritional supplement;Feeding assistance/management;Oral diet/nutrient modifications     Scheduled Med:   atorvastatin  40 mg Oral Daily    dexAMETHasone injection  4 mg Intravenous Q6H    diltiaZEM  180 mg Oral Daily    erythromycin   Left Eye QID    gabapentin  300 mg Oral TID    losartan  100 mg Oral Daily    metoprolol succinate  100 mg Oral Nightly      Continuous Infusions:     PRN Meds:    Current Facility-Administered Medications:     bisacodyL, 10 mg, Rectal, Daily PRN    hydrALAZINE, 10 mg, Intravenous, Q4H PRN    labetalol, 20 mg, Intravenous, Q4H PRN    sodium chloride 0.9%, 10 mL, Intravenous, PRN     Radiology:  I have personally reviewed the following imaging and agree with the radiologist.     CV Ultrasound Bilateral Doppler Carotid  The right internal carotid artery is patent with less than 50% stenosis.  The left  internal carotid artery is patent with less than 50% stenosis.  Bilateral vertebral arteries are patent with antegrade flow.       Ian Elise MD  Department of Hospital Medicine   [facility]   06/12/2025

## 2025-06-12 NOTE — CONSULTS
Consult Note    Reason for Consult:      We were consulted to evaluate this patient for PEG.     HPI:   Patient is a 64-year-old female known to our group (known to Dr. Lopez) with a past medical history of hypertension, benign meningioma s/p resection, hypertension, C5-7 posterior foraminotomies.    She presented to the ER 6/9 with facial droop and difficulty speaking and walking.  Patient has left-sided facial droop 2/2 brain tumor but was more pronounced upon arrival.  Upon arrival, VSS other than hypertension.  CT head shows loss of gray-white differentiation of the right frontal lobe.  Suspicious for acute infarct.    GI consulted for PEG placement    Evaluated by speech who recommends strict NPO.   Afebrile, WBC 15.2, no abx. On IV dexamethasone 4 mg Q 8 hr  Plt 344, INR 1.0, no blood thinners.   No previous gastric bypass/sleeve  No other abdominal imaging to review  Patient/family agree with PEG placement  Tolerating Tfs  Last Bm Sunday    Previous records reviewed. Collateral information obtained from family member present at bedside.  Colonoscopy in the past with Dr. Lopez    PCP:  Tabby Parker MD    Review of patient's allergies indicates:   Allergen Reactions    Codeine     Sulfa (sulfonamide antibiotics) Rash and Other (See Comments)        Current Medications[1]  Prescriptions Prior to Admission[2]    Past Medical History:  Past Medical History:   Diagnosis Date    Benign meningioma     Carpal tunnel syndrome on right     Cataract     Cervical disc displacement     Cervical spondylosis     Hard of hearing     Hyperlipidemia     Hypertension     Neoplasm, brain     Nephrolithiasis     Neurotrophic keratoconjunctivitis     Osteoporosis       Past Surgical History:  Past Surgical History:   Procedure Laterality Date    BRAIN SURGERY  06/04/2020    CARPAL TUNNEL RELEASE Right 06/07/2024    Procedure: RELEASE, CARPAL TUNNEL;  Surgeon: Yuriy Stark MD;  Location: [facility];  Service:  Orthopedics;  Laterality: Right;    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  07/26/2021    COLONOSCOPY      cyberknife for left petroclival tumor  04/20/2009    EYE SURGERY      HYSTERECTOMY  2009    Left C5-6, C6-7 posterior foraminotomies  08/20/2014    [place]    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  06/04/2020    [facility]    removal of kidney stone  1995    TRIGGER FINGER RELEASE Right 06/07/2024    Procedure: RELEASE, TRIGGER FINGER; 3rd and 4th finger;  Surgeon: Yuriy Stark MD;  Location: [facility] OR;  Service: Orthopedics;  Laterality: Right;      Family History:  Family History   Problem Relation Name Age of Onset    Kidney disease Mother Maribel Auzenne     Hyperlipidemia Mother Maribel Auzenne     Hypertension Mother Maribel Auzenne     Arthritis Mother Maribel Auzenne     Heart disease Mother Maribel Auzenne     Stroke Father Stanley Auzenne     Kidney disease Father Stanley Auzenne     Skin cancer Father Stanley Auzenne     Hyperlipidemia Father Stanley Auzenne     Hypertension Father Stanley Auzenne     Cancer Father Stanley Auzenne     Thyroid cancer Sister      Hypertension Sister      Hypertension Brother Stanley Rogerszenne Jr     Diabetes Mellitus Paternal Grandmother Kassandra Auzenne     Diabetes Paternal Grandmother Kassandra Auzenne     Lung cancer Paternal Grandfather Scooter Auzenne     Heart disease Paternal Grandfather Scooter Auzenne     Cancer Sister Veronika Brayan     Hypertension Sister Veronika Brayan     Miscarriages / Stillbirths Sister Veronika Brayan     Cancer Maternal Uncle Jonathan Kennedy     Cancer Paternal Uncle Versey Auzenne     Diabetes Paternal Aunt Candie Mandujano     Early death Paternal Aunt Mimi Duque     Hypertension Brother Sebastien Auzenne     Hypertension Sister Nataliia Coteelle      Social History:  Social History     Tobacco Use    Smoking status: Never    Smokeless tobacco: Never   Substance Use Topics    Alcohol use: Not Currently     Comment: rarely       Review of Systems:      Review of Systems   All other systems reviewed and are negative.      Objective:     VITAL SIGNS: 24 HR MIN & MAX LAST    Temp  Min: 97.2 °F (36.2 °C)  Max: 98.3 °F (36.8 °C)  97.5 °F (36.4 °C)        BP  Min: 134/70  Max: 173/85  (!) 159/84     Pulse  Min: 76  Max: 88  83     Resp  Min: 16  Max: 18  16    SpO2  Min: 95 %  Max: 98 %  98 %        Intake/Output Summary (Last 24 hours) at 6/12/2025 1354  Last data filed at 6/11/2025 2000  Gross per 24 hour   Intake 120 ml   Output --   Net 120 ml       Physical Exam  Constitutional:       General: She is not in acute distress.     Appearance: She is normal weight. She is ill-appearing. She is not toxic-appearing.   HENT:      Head: Normocephalic and atraumatic.      Mouth/Throat:      Mouth: Mucous membranes are dry.      Pharynx: Oropharynx is clear.      Comments: Facial droop  Cardiovascular:      Rate and Rhythm: Normal rate and regular rhythm.      Pulses: Normal pulses.      Heart sounds: Normal heart sounds.   Pulmonary:      Effort: Pulmonary effort is normal.      Breath sounds: Normal breath sounds.   Abdominal:      General: Bowel sounds are normal. There is no distension.      Palpations: Abdomen is soft. There is no mass.      Tenderness: There is no abdominal tenderness. There is no guarding.      Hernia: No hernia is present.      Comments: NGT noted   Skin:     General: Skin is warm and dry.   Neurological:      Mental Status: She is alert and oriented to person, place, and time.   Psychiatric:         Mood and Affect: Mood normal.         Behavior: Behavior normal.         Thought Content: Thought content normal.         Judgment: Judgment normal.           Recent Results (from the past 48 hours)   Comprehensive metabolic panel    Collection Time: 06/11/25  3:45 AM   Result Value Ref Range    Sodium 140 136 - 145 mmol/L    Potassium 3.6 3.5 - 5.1 mmol/L    Chloride 104 98 - 107 mmol/L    CO2 25 23 - 31 mmol/L    Glucose 137 (H) 82 - 115 mg/dL     Blood Urea Nitrogen 35.8 (H) 9.8 - 20.1 mg/dL    Creatinine 0.85 0.55 - 1.02 mg/dL    Calcium 9.8 8.4 - 10.2 mg/dL    Protein Total 7.5 5.8 - 7.6 gm/dL    Albumin 3.7 3.4 - 4.8 g/dL    Globulin 3.8 (H) 2.4 - 3.5 gm/dL    Albumin/Globulin Ratio 1.0 (L) 1.1 - 2.0 ratio    Bilirubin Total 0.5 <=1.5 mg/dL    ALP 75 40 - 150 unit/L    ALT 16 0 - 55 unit/L    AST 17 11 - 45 unit/L    eGFR >60 mL/min/1.73/m2    Anion Gap 11.0 mEq/L    BUN/Creatinine Ratio 42    CBC with Differential    Collection Time: 06/11/25  3:45 AM   Result Value Ref Range    WBC 15.24 (H) 4.50 - 11.50 x10(3)/mcL    RBC 4.85 4.20 - 5.40 x10(6)/mcL    Hgb 13.3 12.0 - 16.0 g/dL    Hct 40.2 37.0 - 47.0 %    MCV 82.9 80.0 - 94.0 fL    MCH 27.4 27.0 - 31.0 pg    MCHC 33.1 33.0 - 36.0 g/dL    RDW 13.1 11.5 - 17.0 %    Platelet 344 130 - 400 x10(3)/mcL    MPV 9.8 7.4 - 10.4 fL    Neut % 86.9 %    Lymph % 4.9 %    Mono % 7.6 %    Eos % 0.0 %    Basophil % 0.1 %    Imm Grans % 0.5 %    Neut # 13.25 (H) 2.1 - 9.2 x10(3)/mcL    Lymph # 0.74 0.6 - 4.6 x10(3)/mcL    Mono # 1.16 0.1 - 1.3 x10(3)/mcL    Eos # 0.00 0 - 0.9 x10(3)/mcL    Baso # 0.02 <=0.2 x10(3)/mcL    Imm Gran # 0.07 (H) 0.00 - 0.04 x10(3)/mcL    NRBC% 0.0 %       CV Ultrasound Bilateral Doppler Carotid  Result Date: 6/11/2025  The right internal carotid artery is patent with less than 50% stenosis. The left internal carotid artery is patent with less than 50% stenosis. Bilateral vertebral arteries are patent with antegrade flow.     XR Gastric tube check, non-radiologist performed  Result Date: 6/10/2025  EXAMINATION: XR GASTRIC TUBE CHECK, NON-RADIOLOGIST PERFORMED CLINICAL HISTORY: placement; TECHNIQUE: Single view of the abdomen COMPARISON: None FINDINGS: EG tube projects over the left upper quadrant expected location of the stomach     As above. Electronically signed by: Dustin Peña Date:    06/10/2025 Time:    16:33    Fl Modified Barium Swallow Speech  Result Date: 6/10/2025  See procedure  notes from Speech Pathologist. This procedure was auto-finalized.    CT Head Without Contrast  Result Date: 6/10/2025  EXAMINATION: CT HEAD WITHOUT CONTRAST CLINICAL HISTORY: fu p fossa hemorrhage; TECHNIQUE: Axial scans were obtained from skull base to the vertex. Coronal and sagittal reconstructions obtained from the axial data. Automatic exposure control was utilized to limit radiation dose. Contrast: None Radiation Dose: Total DLP: 830 mGy*cm COMPARISON: CT head dated 06/09/2025 FINDINGS: There is similar appearance of parenchymal hemorrhage in the left middle cerebellar peduncle measuring 1.8 x 2.6 cm in size.  Residual left CP angle meningioma is better visualized on comparison MRI.  There is encephalomalacia in the left temporal lobe.  Patchy hypodensities in the subcortical periventricular white matter likely represent chronic microvascular ischemic changes.  There is stable mass effect with partial effacement of the 4th ventricle.  The lateral ventricles remain stable in size.  The skull base is intact.     Stable exam without significant interval change. Electronically signed by: Maddie Benavides Date:    06/10/2025 Time:    10:37    MRI Brain W WO Contrast  Result Date: 6/9/2025  EXAMINATION: MRI BRAIN W WO CONTRAST CLINICAL HISTORY: Meningioma; TECHNIQUE: Multiplanar multisequence MR imaging of the brain was performed without and with contrast. COMPARISON: 07/18/2024 MRI FINDINGS: There is some mild cerebral atrophy seen consistent with patient's age.  There is 80 residual area of meningioma seen at the cerebellopontine angle on the left side that measures 2.1 x 0.9 cm.  It is slightly larger than the prior examination.  There are postsurgical changes seen in the left temporal fossa and left cerebellopontine angle from previous meningioma resection.  The meningioma extends anteriorly to the jose and abuts the basilar artery on the left side.  There is another meningioma seen on the right side of the  centrum semiovale measured on image 152 series 10.  It measures 1.1 x 0.8 cm.  It is partially calcified. There is old area of focal hemorrhage or calcification in the posterior frontal region at the cerebral convexity on the right side image 10 series 4.  This was seen on prior examination as well. There is some cerebral atrophy and some periventricular white matter change consistent with patient's age.     Residual meningioma at the cerebellopontine angle appears larger than the prior examination and there is some increased surrounding edema. Postsurgical changes in the left temporal fossa and left posterior fossa stable meningioma the centrum semiovale on the right side Old area of focal hemorrhage or calcification in the posterior cerebral convexity on the right side relatively stable since the prior examination Electronically signed by: Hernando Najera Date:    06/09/2025 Time:    14:12    CTA Head and Neck (xpd)  Result Date: 6/9/2025  EXAMINATION: CTA HEAD AND NECK (XPD) CLINICAL HISTORY: Vertigo, central; TECHNIQUE: CT angiogram was performed from the level of the minor to the top of the head following the IV administration of contrast. 100 mL Isovue 370.  Sagittal and coronal reconstructions and maximum intensity projection reconstructions were performed. Arterial stenosis percentages are based on NASCET measurement criteria.  Automated exposure control, dose radiation lowering technique was utilized. COMPARISON: No prior CTA FINDINGS: CTA NECK: Common origin of the brachiocephalic trunk and left common carotid artery.  Left vertebral artery arises from the aortic arch, normal anatomic variants. Patent right common carotid artery.  Small amount of atherosclerotic plaque at the carotid bulb without flow-limiting stenosis.  Patent external carotid artery.  Continuing cervical ICA is widely patent. Patent left common carotid artery with small amount of calcified atherosclerotic plaque at the distal common  carotid artery.  Patent carotid bulb with no flow-limiting stenosis.  Continuing cervical ICA is widely patent.  Patent external carotid artery. Dominant right vertebral artery.  No flow-limiting stenosis at throughout the courses of either vertebral artery. CTA HEAD: No flow-limiting stenosis within the intracranial arterial vasculature.  There is mild calcified atherosclerotic plaque along the carotid siphons bilaterally. Patent dural venous sinuses. Other: Pleuroparenchymal scarring is present at the lung apices.  Degenerative disc changes are present at the cervical spine.     1. No flow-limiting stenosis or large vessel occlusion within the intracranial arterial vasculature. 2. No flow-limiting stenosis within the cervical arterial vasculature. 3. Patent dural venous sinuses. Electronically signed by: Bernardo Roy MD Date:    06/09/2025 Time:    12:19    CT Head Without Contrast  Result Date: 6/9/2025  EXAMINATION: CT HEAD WITHOUT CONTRAST INDICATION: Neuro deficit, acute, stroke suspected;Facial Droop (Difficulty speaking and walking starting yesterday evening at unknown time. L sided facial droop baseline s/t brain tumor but more severe since yesterday as well. Headache since Thursday.  VAN negative. ) TECHNIQUE: Contiguous axial images are acquired through the head without IV contrast.  These images were reconstructed into the coronal and sagittal plane.  Automated exposure control, dose radiation lowering technique was utilized. COMPARISON: Brain MRI from 07/18/2024 FINDINGS: Patient status post left frontotemporal craniotomy.  Clear mastoid air cells.  Left temporal encephalomalacia is present deep to the postsurgical changes.  Findings are similar compared to 07/18/2024 when allowing for differences in technique. There is a geographic area of decreased density at the right frontal lobe with loss of gray-white differentiation.  No extra-axial fluid collection, contusion or hemorrhage.  No  midline shift.  Patent basilar cisterns.  No hyperdense vessel sign. Partially calcified right frontal convexity, presumed meningioma measures 1.2 x 0.8 cm, similar in size since 07/18/2024. Left CP angle mass with erosion at the petrous apex measures approximately 3.7 by 2.5 cm, larger since 07/18/2024, previously measuring 1.5 x 0.6 cm.     1. Loss of gray-white differentiation at the right frontal lobe (best seen image 30, series 2).  Findings suspicious for acute infarct.  No evidence of hemorrhagic transformation. 2. No evidence of intraparenchymal hemorrhage. 3. Findings most consistent with interval enlargement of left CP angle mass since 07/18/2024.  Findings on MRI from 07/19/2024 suggested underlying meningioma. Electronically signed by: Bernardo Roy MD Date:    06/09/2025 Time:    11:52      Imaging personally reviewed by myself and SP.    Assessment / Plan:     64-year-old white female known to our group with a past medical history of hypertension, benign meningioma s/p resection, hypertension, C5-7 posterior foraminotomies. Presented to the ER 6/9 with facial droop and difficulty speaking and walking.  Patient has left-sided facial droop 2/2 brain tumor but was more pronounced upon arrival.  Upon arrival, VSS other than hypertension.  CT head shows loss of gray-white differentiation of the right frontal lobe.  Suspicious for acute infarct.    GI consulted for PEG placement  1. Need for alternative means of nutrition  2. Oropharyngeal dysphagia   Not cleared by speech  - Discussed EGD/PEG with family/patient in detail including R/B/A.  Wishes to proceed.    - NPO after MN and Hold TFs at [facility] for EGD/PEG tomorrow.   - Hold AM ASA and lovenox.   - Will need to keep abdominal binder in place at all times to prevent dislodgement.      Thank you for allowing us to participate in this patient's care.  Case and plan of care discussed with MD Jamila Yip NP with Dr. Dustin Priest MD          [1]    Current Facility-Administered Medications   Medication Dose Route Frequency Provider Last Rate Last Admin    atorvastatin tablet 40 mg  40 mg Oral Daily Marcelino Benedict MD   40 mg at 06/12/25 0851    bisacodyL suppository 10 mg  10 mg Rectal Daily PRN Marcelino Benedict MD        carvediloL tablet 6.25 mg  6.25 mg Oral BID Ian Elise MD        dexAMETHasone injection 4 mg  4 mg Intravenous Q8H Ian Elise MD   4 mg at 06/12/25 1230    diltiaZEM 24 hr capsule 180 mg  180 mg Oral Daily Marcelino Benedict MD   180 mg at 06/12/25 0851    erythromycin 5 mg/gram (0.5 %) ophthalmic ointment   Left Eye QID Marcelino Benedict MD   Given at 06/12/25 0852    gabapentin capsule 300 mg  300 mg Oral TID Marcelino Benedict MD   300 mg at 06/12/25 0851    hydrALAZINE injection 10 mg  10 mg Intravenous Q4H PRN Marcelino Benedict MD   10 mg at 06/11/25 2328    labetaloL injection 20 mg  20 mg Intravenous Q4H PRN Marcelino Benedict MD   20 mg at 06/12/25 1122    losartan tablet 100 mg  100 mg Oral Daily Marcelino Benedict MD   100 mg at 06/12/25 0851    pantoprazole injection 40 mg  40 mg Intravenous Daily Ian Elise MD        sodium chloride 0.9% flush 10 mL  10 mL Intravenous PRN Marcelino Benedict MD       [2]   Medications Prior to Admission   Medication Sig Dispense Refill Last Dose/Taking    aspirin (ECOTRIN) 81 MG EC tablet Take 1 tablet by mouth once daily.   Taking    atorvastatin (LIPITOR) 10 MG tablet Take 10 mg by mouth every evening.   Taking    biotin 5,000 mcg TbDL Take 1 tablet by mouth Daily.   Taking    calcium carbonate (OS-MATEO) 600 mg calcium (1,500 mg) Tab Take 600 mg by mouth 2 (two) times daily with meals.   Taking    diltiaZEM (CARDIZEM CD) 180 MG 24 hr capsule Take 180 mg by mouth once daily.   Taking    ergocalciferol (ERGOCALCIFEROL) 50,000 unit Cap Take 50,000 Units by mouth every 6 weeks.   Taking    erythromycin (ROMYCIN) ophthalmic ointment Place into the left eye 4 (four) times daily.    Taking    estradioL (ESTRACE) 1 MG tablet Take 1 mg by mouth nightly.   Taking    gabapentin (NEURONTIN) 300 MG capsule TAKE 1 CAPSULE BY MOUTH THREE TIMES A DAY 90 capsule 2 Taking    ipratropium (ATROVENT) 21 mcg (0.03 %) nasal spray 2 sprays by Each Nostril route 3 (three) times daily.   Taking    losartan (COZAAR) 100 MG tablet Take 100 mg by mouth once daily.   Taking    metoprolol succinate (TOPROL-XL) 100 MG 24 hr tablet Take 100 mg by mouth nightly.   Taking    trospium (SANCTURA) 20 mg Tab tablet Take 20 mg by mouth 2 (two) times daily.   Taking    vibegron (GEMTESA) 75 mg Tab Take 75 mg by mouth Daily.   Taking    vitamin K2 100 mcg Cap Take 1 capsule by mouth Daily.   Taking    amLODIPine (NORVASC) 10 MG tablet Take 10 mg by mouth once daily.       denosumab (PROLIA) 60 mg/mL Syrg Inject 60 mg into the skin every 6 (six) months.       erythromycin base (ERYTHROMYCIN OPHT) Apply to eye 3 (three) times daily.       folic acid (FOLVITE) 1 MG tablet Take 1 tablet by mouth once daily.       HYDROcodone-acetaminophen (NORCO) 5-325 mg per tablet Take 1 tablet by mouth every 6 (six) hours as needed for Pain. (Patient not taking: Reported on 9/12/2024) 12 tablet 0

## 2025-06-12 NOTE — PT/OT/SLP PROGRESS
Physical Therapy Treatment    Patient Name:  Kaity Cuevas   MRN:  [id number]    Recommendations:     Discharge therapy intensity: High Intensity Therapy   Discharge Equipment Recommendations: to be determined by next level of care  Barriers to discharge: Impaired mobility and Ongoing medical needs    Assessment:     Kaity Cuevas is a 64 y.o. 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  She presents with the following impairments/functional limitations: weakness, impaired endurance, impaired self care skills, impaired functional mobility, gait instability, impaired balance, decreased coordination, impaired coordination.    Rehab Prognosis: Good; patient would benefit from acute skilled PT services to address these deficits and reach maximum level of function.    Recent Surgery: Procedure(s) (LRB):  PEG (N/A)      Plan:     During this hospitalization, patient would benefit from acute PT services 6 x/week to address the identified rehab impairments via gait training, therapeutic activities, therapeutic exercises, neuromuscular re-education and progress toward the following goals:    Plan of Care Expires:  07/10/25    Subjective     Chief Complaint: NA  Patient/Family Comments/goals: to get better  Pain/Comfort:  Pain Rating 1: 0/10      Objective:     Communicated with nsg prior to session.  Patient found up in chair with NG tube, PureWick, peripheral IV, telemetry upon PT entry to room.     General Precautions: Standard, fall, SBP<160  Orthopedic Precautions:    Braces: N/A  Respiratory Status: Room air  Blood Pressure: 162/82  Skin Integrity: Visible skin intact      Functional Mobility:  Transfers:     Sit to Stand:  minimum assistance with rolling walker  Gait: Pt ambulated 60ft Ayala with RW. Pt demonstrated L lateral lean throughout with frequent VC/TC to correct. Pt also demonstrates NBOS and LLE stepping in  tandem. Overall pt unsteady throughout gait trial; minor LOB but pt able to stabilize herself      Therapeutic Activities/Exercises:  Standing Balance: Ayala with eyes closed and opened.   Mirror Activity: 'X' placed on various spots on mirror and had pt reach with arm to touch each 'X'; requires pt to reach in various planes/fields outside TONE; x2 sets on BUE    Co-Treatment: No    Education:  Patient and spouse were provided with verbal education education regarding PT role/goals/POC, fall prevention, safety awareness, and discharge/DME recommendations.  Understanding was verbalized.     Patient left up in chair with all lines intact, call button in reach, and  present    GOALS:   Multidisciplinary Problems       Physical Therapy Goals          Problem: Physical Therapy    Goal Priority Disciplines Outcome Interventions   Physical Therapy Goal     PT, PT/OT Progressing    Description: Goals to be met by: 7/10/2025     Patient will increase functional independence with mobility by performin. Supine to sit with [place]  2. Sit to supine with [place]  3. Sit to stand transfer with [place]  4. Gait  x 100 feet with [place] using No Assistive Device.                          Time Tracking:     PT Received On: 25  PT Start Time: 1401     PT Stop Time: 1434  PT Total Time (min): 33 min     Billable Minutes: Gait Training 1 and Neuromuscular Re-education 1    Treatment Type: Treatment  PT/PTA: PT     Number of PTA visits since last PT visit: 2     2025

## 2025-06-12 NOTE — PT/OT/SLP PROGRESS
[facility]  Speech Language Pathology Department  Dysphagia Therapy Progress Note    Patient Name:  Kaity Cuevas   MRN:  [id number]    Recommendations     General recommendations:  dysphagia therapy  Solid texture recommendation:  NPO  Liquid consistency recommendation: NPO   Medications: NPO    Discharge therapy intensity: High Intensity Therapy   Barriers to safe discharge:  acuity of illness and severity of impairment    Subjective     Patient awake and alert. Pt sitting in chair.  During session, patient found to be having tremors of her head (shaking her head) that her sister also noticed to be new. SLP reported findings to nurse.  Spiritual/Cultural/Religion Beliefs/Practices that affect care: no    Pain/Comfort: Pain Rating 1: 0/10    Objective     Oral Musculature  Oral care provided. Set up longer tubing for oral suction, as patient enjoys sitting in the chair and it did not reach. SLP educated and practiced suctioning oral cavity with patient, as she is not able to do it independently.    Therapeutic Activities:  Pt tolerated thermal stimulation to the anterior faucial pillars x10 with 100% swallow responses. Delay 5-8 seconds.    Therapeutic PO Trials:  Consistency Amount Fed By Oral Symptoms Pharyngeal Symptoms   Puree x5 SLP Slowed oral transit time Swallow delay 5-8 seconds     Assessment     Pt continues to present with oropharyngeal dysphagia and remains unsafe for PO diet.    Outcome Measures     Functional Oral Intake Scale: 1 - Nothing by mouth    Goals     Multidisciplinary Problems       SLP Goals          Problem: SLP    Goal Priority Disciplines Outcome   SLP Goal     SLP Progressing   Description: LTG: The pt will tolerate least restrictive diet with no overt s/sx of aspiration.    STGs:  1. Patient will provide swallow response with delay < 2 seconds.  2. Patient will tolerate puree trials with no signs/sx of aspiration.  3. Patient will participate in  repeat MBS.                       Patient Education     Patient and family were provided with verbal education regarding SLP POC.  Understanding was verbalized.    Plan     Will continue to follow and tx as appropriate.    SLP Follow-Up:  Yes   Patient to be seen:  daily   Plan of Care expires:  06/17/25  Plan of Care reviewed with:  patient, family       Time Tracking     SLP Treatment Date:   06/12/25  Speech Start Time:  1210  Speech Stop Time:  1230     Speech Total Time (min):  20 min    Billable minutes:  Treatment of Swallow Dysfunction, 20 minutes       06/12/2025

## 2025-06-12 NOTE — PT/OT/SLP PROGRESS
Occupational Therapy   Treatment    Name: Kaity Cuevas  MRN: [id number]    Recommendations:     Recommended therapy intensity at discharge: High Intensity Therapy   Discharge Equipment Recommendations:  to be determined by next level of care  Barriers to discharge:       Assessment:     Kaity Cuevas is a 64 y.o. female with a medical diagnosis of <principal problem not specified>.  She presents with good motivation and participation. Performance deficits affecting function are weakness, impaired endurance, impaired self care skills, impaired functional mobility, gait instability, impaired balance, decreased safety awareness, decreased lower extremity function, decreased upper extremity function, impaired coordination.     Rehab Prognosis:  Good; patient would benefit from acute skilled OT services to address these deficits and reach maximum level of function.       Plan:     Patient to be seen 6 x/week to address the above listed problems via self-care/home management, therapeutic activities, therapeutic exercises, neuromuscular re-education  Plan of Care Expires: 07/08/25  Plan of Care Reviewed with: patient, sibling    Subjective     Pain/Comfort:  Pain Rating 1: 0/10    Objective:     Communicated with: nurse prior to session.  Patient found HOB elevated with NG tube, PureWick, pulse ox (continuous), peripheral IV, telemetry upon OT entry to room.    General Precautions: Standard, fall    Orthopedic Precautions:N/A  Braces: N/A  Respiratory Status: Room air  Vital Signs: Blood Pressure: 161/87     Occupational Performance:     Functional Mobility/Transfers:  Bed mobility:    Scooting: minimum assistance  Supine to Sit: contact guard assistance  Transfers: Sit to Stand: minimum assistance and moderate assistance with rolling walker  Ambulate to and from bathroom with RW with Min/Mod A secondary to left lateral lean    Activities of Daily Living:  Grooming: minimum assistance to brush teeth, no assistance  with activity however assistance required to maintain standing balance with RW during ax due to left lateral lean   LB dressing: CGA sitting edge of bed to adjust socks    Therapeutic Positioning    OT interventions performed during the course of today's session in an effort to prevent and/or reduce acquired pressure injuries:   Education was provided on benefits of and recommendations for therapeutic positioning    AMPAC 6 Click ADL: 18    Co-Treatment: No    Patient Education:  Patient provided with verbal education and demonstrations education regarding OT role/goals/POC, fall prevention, and safety awareness.  Understanding was verbalized, however additional teaching warranted.      Patient left up in chair with all lines intact, call button in reach, and sister present.    GOALS:   Multidisciplinary Problems       Occupational Therapy Goals          Problem: Occupational Therapy    Goal Priority Disciplines Outcome Interventions   Occupational Therapy Goal     OT, PT/OT Progressing    Description: Goals to be met by: 7/8/25     Patient will increase functional independence with ADLs by performing:    UE Dressing with Modified [place].  LE Dressing with Modified [place].  Grooming while standing with Modified [place].  Toileting from toilet with Modified [place] for hygiene and clothing management.   Toilet transfer to toilet with Modified [place]. Progress from commode as appropriate.                          Time Tracking:     OT Date of Treatment: 06/12/25  OT Start Time: 0930  OT Stop Time: 1017  OT Total Time (min): 47 min    Billable Minutes:Self Care/Home Management 28  Therapeutic Activity 19    Supervising Occupational Therapist: LUKE Santoyo  OT/ELIZABETH: ELIZABETH     Number of ELIZABETH visits since last OT visit: 2    6/12/2025

## 2025-06-12 NOTE — PLAN OF CARE
Patient is recommended to continue high intensity therapy post discharge. She is still on NG tube feedings - will follow for improved MBS or planned peg placement prior to sending inpatient rehab referral.  Patient and family updated.

## 2025-06-13 ENCOUNTER — ANESTHESIA EVENT (OUTPATIENT)
Dept: ENDOSCOPY | Facility: HOSPITAL | Age: 65
End: 2025-06-13
Payer: MEDICARE

## 2025-06-13 ENCOUNTER — ANESTHESIA (OUTPATIENT)
Dept: ENDOSCOPY | Facility: HOSPITAL | Age: 65
End: 2025-06-13
Payer: MEDICARE

## 2025-06-13 LAB
ANION GAP SERPL CALC-SCNC: 8 MEQ/L
BASOPHILS # BLD AUTO: 0.01 X10(3)/MCL
BASOPHILS NFR BLD AUTO: 0.1 %
BUN SERPL-MCNC: 29.1 MG/DL (ref 9.8–20.1)
CALCIUM SERPL-MCNC: 8.9 MG/DL (ref 8.4–10.2)
CHLORIDE SERPL-SCNC: 104 MMOL/L (ref 98–107)
CO2 SERPL-SCNC: 29 MMOL/L (ref 23–31)
CREAT SERPL-MCNC: 0.71 MG/DL (ref 0.55–1.02)
CREAT/UREA NIT SERPL: 41
EOSINOPHIL # BLD AUTO: 0 X10(3)/MCL (ref 0–0.9)
EOSINOPHIL NFR BLD AUTO: 0 %
ERYTHROCYTE [DISTWIDTH] IN BLOOD BY AUTOMATED COUNT: 13.8 % (ref 11.5–17)
GFR SERPLBLD CREATININE-BSD FMLA CKD-EPI: >60 ML/MIN/1.73/M2
GLUCOSE SERPL-MCNC: 129 MG/DL (ref 82–115)
HCT VFR BLD AUTO: 41.9 % (ref 37–47)
HGB BLD-MCNC: 13.7 G/DL (ref 12–16)
IMM GRANULOCYTES # BLD AUTO: 0.07 X10(3)/MCL (ref 0–0.04)
IMM GRANULOCYTES NFR BLD AUTO: 0.6 %
INR PPP: 1
LYMPHOCYTES # BLD AUTO: 0.69 X10(3)/MCL (ref 0.6–4.6)
LYMPHOCYTES NFR BLD AUTO: 5.7 %
MCH RBC QN AUTO: 27.3 PG (ref 27–31)
MCHC RBC AUTO-ENTMCNC: 32.7 G/DL (ref 33–36)
MCV RBC AUTO: 83.5 FL (ref 80–94)
MONOCYTES # BLD AUTO: 0.91 X10(3)/MCL (ref 0.1–1.3)
MONOCYTES NFR BLD AUTO: 7.6 %
NEUTROPHILS # BLD AUTO: 10.34 X10(3)/MCL (ref 2.1–9.2)
NEUTROPHILS NFR BLD AUTO: 86 %
NRBC BLD AUTO-RTO: 0 %
OHS QRS DURATION: 92 MS
OHS QTC CALCULATION: 490 MS
PLATELET # BLD AUTO: 353 X10(3)/MCL (ref 130–400)
PMV BLD AUTO: 10 FL (ref 7.4–10.4)
POTASSIUM SERPL-SCNC: 4 MMOL/L (ref 3.5–5.1)
PROTHROMBIN TIME: 13.1 SECONDS (ref 12.5–14.5)
RBC # BLD AUTO: 5.02 X10(6)/MCL (ref 4.2–5.4)
SODIUM SERPL-SCNC: 141 MMOL/L (ref 136–145)
TROPONIN I SERPL-MCNC: <0.01 NG/ML (ref 0–0.04)
WBC # BLD AUTO: 12.02 X10(3)/MCL (ref 4.5–11.5)

## 2025-06-13 PROCEDURE — 27201423 OPTIME MED/SURG SUP & DEVICES STERILE SUPPLY: Performed by: STUDENT IN AN ORGANIZED HEALTH CARE EDUCATION/TRAINING PROGRAM

## 2025-06-13 PROCEDURE — 25000003 PHARM REV CODE 250

## 2025-06-13 PROCEDURE — 85610 PROTHROMBIN TIME: CPT

## 2025-06-13 PROCEDURE — 93010 ELECTROCARDIOGRAM REPORT: CPT | Mod: ,,, | Performed by: INTERNAL MEDICINE

## 2025-06-13 PROCEDURE — 63600175 PHARM REV CODE 636 W HCPCS: Performed by: NURSE ANESTHETIST, CERTIFIED REGISTERED

## 2025-06-13 PROCEDURE — 43246 EGD PLACE GASTROSTOMY TUBE: CPT | Performed by: STUDENT IN AN ORGANIZED HEALTH CARE EDUCATION/TRAINING PROGRAM

## 2025-06-13 PROCEDURE — 37000008 HC ANESTHESIA 1ST 15 MINUTES: Performed by: STUDENT IN AN ORGANIZED HEALTH CARE EDUCATION/TRAINING PROGRAM

## 2025-06-13 PROCEDURE — 36415 COLL VENOUS BLD VENIPUNCTURE: CPT

## 2025-06-13 PROCEDURE — 84484 ASSAY OF TROPONIN QUANT: CPT

## 2025-06-13 PROCEDURE — 25000003 PHARM REV CODE 250: Performed by: NURSE ANESTHETIST, CERTIFIED REGISTERED

## 2025-06-13 PROCEDURE — 0DH63UZ INSERTION OF FEEDING DEVICE INTO STOMACH, PERCUTANEOUS APPROACH: ICD-10-PCS | Performed by: STUDENT IN AN ORGANIZED HEALTH CARE EDUCATION/TRAINING PROGRAM

## 2025-06-13 PROCEDURE — 21400001 HC TELEMETRY ROOM

## 2025-06-13 PROCEDURE — 25000003 PHARM REV CODE 250: Performed by: INTERNAL MEDICINE

## 2025-06-13 PROCEDURE — 63600175 PHARM REV CODE 636 W HCPCS: Performed by: INTERNAL MEDICINE

## 2025-06-13 PROCEDURE — 63600175 PHARM REV CODE 636 W HCPCS

## 2025-06-13 PROCEDURE — 85025 COMPLETE CBC W/AUTO DIFF WBC: CPT

## 2025-06-13 PROCEDURE — 37000009 HC ANESTHESIA EA ADD 15 MINS: Performed by: STUDENT IN AN ORGANIZED HEALTH CARE EDUCATION/TRAINING PROGRAM

## 2025-06-13 PROCEDURE — 92526 ORAL FUNCTION THERAPY: CPT

## 2025-06-13 PROCEDURE — 93005 ELECTROCARDIOGRAM TRACING: CPT

## 2025-06-13 PROCEDURE — 97116 GAIT TRAINING THERAPY: CPT

## 2025-06-13 PROCEDURE — 80048 BASIC METABOLIC PNL TOTAL CA: CPT

## 2025-06-13 RX ORDER — ONDANSETRON HYDROCHLORIDE 2 MG/ML
4 INJECTION, SOLUTION INTRAVENOUS ONCE
Status: COMPLETED | OUTPATIENT
Start: 2025-06-13 | End: 2025-06-13

## 2025-06-13 RX ORDER — CEFAZOLIN SODIUM 1 G/3ML
INJECTION, POWDER, FOR SOLUTION INTRAMUSCULAR; INTRAVENOUS
Status: DISCONTINUED | OUTPATIENT
Start: 2025-06-13 | End: 2025-06-13

## 2025-06-13 RX ORDER — PROPOFOL 10 MG/ML
VIAL (ML) INTRAVENOUS
Status: COMPLETED
Start: 2025-06-13 | End: 2025-06-13

## 2025-06-13 RX ORDER — CEFAZOLIN SODIUM 1 G/3ML
INJECTION, POWDER, FOR SOLUTION INTRAMUSCULAR; INTRAVENOUS
Status: DISPENSED
Start: 2025-06-13 | End: 2025-06-13

## 2025-06-13 RX ORDER — PROPOFOL 10 MG/ML
VIAL (ML) INTRAVENOUS
Status: DISCONTINUED | OUTPATIENT
Start: 2025-06-13 | End: 2025-06-13

## 2025-06-13 RX ORDER — LIDOCAINE HYDROCHLORIDE 10 MG/ML
INJECTION, SOLUTION EPIDURAL; INFILTRATION; INTRACAUDAL; PERINEURAL
Status: DISCONTINUED | OUTPATIENT
Start: 2025-06-13 | End: 2025-06-13

## 2025-06-13 RX ORDER — PROPOFOL 10 MG/ML
VIAL (ML) INTRAVENOUS CONTINUOUS PRN
Status: DISCONTINUED | OUTPATIENT
Start: 2025-06-13 | End: 2025-06-13

## 2025-06-13 RX ORDER — LIDOCAINE HYDROCHLORIDE 10 MG/ML
INJECTION, SOLUTION EPIDURAL; INFILTRATION; INTRACAUDAL; PERINEURAL
Status: COMPLETED
Start: 2025-06-13 | End: 2025-06-13

## 2025-06-13 RX ORDER — HYDRALAZINE HYDROCHLORIDE 25 MG/1
25 TABLET, FILM COATED ORAL EVERY 8 HOURS
Status: DISCONTINUED | OUTPATIENT
Start: 2025-06-13 | End: 2025-06-14

## 2025-06-13 RX ORDER — SODIUM CHLORIDE, SODIUM LACTATE, POTASSIUM CHLORIDE, CALCIUM CHLORIDE 600; 310; 30; 20 MG/100ML; MG/100ML; MG/100ML; MG/100ML
INJECTION, SOLUTION INTRAVENOUS CONTINUOUS
Status: ACTIVE | OUTPATIENT
Start: 2025-06-13 | End: 2025-06-14

## 2025-06-13 RX ADMIN — GABAPENTIN 300 MG: 300 CAPSULE ORAL at 09:06

## 2025-06-13 RX ADMIN — HYDRALAZINE HYDROCHLORIDE 10 MG: 20 INJECTION INTRAMUSCULAR; INTRAVENOUS at 12:06

## 2025-06-13 RX ADMIN — ONDANSETRON 4 MG: 2 INJECTION INTRAMUSCULAR; INTRAVENOUS at 02:06

## 2025-06-13 RX ADMIN — ERYTHROMYCIN: 5 OINTMENT OPHTHALMIC at 03:06

## 2025-06-13 RX ADMIN — CEFAZOLIN 1 G: 330 INJECTION, POWDER, FOR SOLUTION INTRAMUSCULAR; INTRAVENOUS at 11:06

## 2025-06-13 RX ADMIN — HYDRALAZINE HYDROCHLORIDE 10 MG: 20 INJECTION INTRAMUSCULAR; INTRAVENOUS at 05:06

## 2025-06-13 RX ADMIN — DEXAMETHASONE SODIUM PHOSPHATE 4 MG: 4 INJECTION, SOLUTION INTRA-ARTICULAR; INTRALESIONAL; INTRAMUSCULAR; INTRAVENOUS; SOFT TISSUE at 05:06

## 2025-06-13 RX ADMIN — LIDOCAINE HYDROCHLORIDE 50 MG: 10 INJECTION, SOLUTION EPIDURAL; INFILTRATION; INTRACAUDAL; PERINEURAL at 11:06

## 2025-06-13 RX ADMIN — PROPOFOL 150 MCG/KG/MIN: 10 INJECTION, EMULSION INTRAVENOUS at 11:06

## 2025-06-13 RX ADMIN — SODIUM CHLORIDE: 9 INJECTION, SOLUTION INTRAVENOUS at 11:06

## 2025-06-13 RX ADMIN — HYDRALAZINE HYDROCHLORIDE 25 MG: 25 TABLET ORAL at 09:06

## 2025-06-13 RX ADMIN — ERYTHROMYCIN: 5 OINTMENT OPHTHALMIC at 09:06

## 2025-06-13 RX ADMIN — LABETALOL HYDROCHLORIDE 20 MG: 5 INJECTION, SOLUTION INTRAVENOUS at 02:06

## 2025-06-13 RX ADMIN — CARVEDILOL 6.25 MG: 3.12 TABLET, FILM COATED ORAL at 09:06

## 2025-06-13 RX ADMIN — DEXAMETHASONE SODIUM PHOSPHATE 4 MG: 4 INJECTION, SOLUTION INTRA-ARTICULAR; INTRALESIONAL; INTRAMUSCULAR; INTRAVENOUS; SOFT TISSUE at 09:06

## 2025-06-13 RX ADMIN — PROPOFOL 80 MG: 10 INJECTION, EMULSION INTRAVENOUS at 11:06

## 2025-06-13 RX ADMIN — SODIUM CHLORIDE, POTASSIUM CHLORIDE, SODIUM LACTATE AND CALCIUM CHLORIDE: 600; 310; 30; 20 INJECTION, SOLUTION INTRAVENOUS at 08:06

## 2025-06-13 RX ADMIN — DEXAMETHASONE SODIUM PHOSPHATE 4 MG: 4 INJECTION, SOLUTION INTRA-ARTICULAR; INTRALESIONAL; INTRAMUSCULAR; INTRAVENOUS; SOFT TISSUE at 02:06

## 2025-06-13 NOTE — PLAN OF CARE
Discussed recommendations for high intensity therapy post discharge. Given list of providers. FOC obtained. Referral sent to [facility]. Rehab.

## 2025-06-13 NOTE — PROVATION PATIENT INSTRUCTIONS
Discharge Summary/Instructions after an Endoscopic Procedure  Patient Name: Kaity Cuevas  Patient MRN: [id number]  Patient YOB: 1960  Friday, June 13, 2025  Dustin Priest MD  Dear patient,  As a result of recent federal legislation (The Federal Cures Act), you may   receive lab or pathology results from your procedure in your [facility]   account before your physician is able to contact you. Your physician or   their representative will relay the results to you with their   recommendations at their soonest availability.  Thank you,  RESTRICTIONS:  During your procedure today, you received medications for sedation.  These   medications may affect your judgment, balance and coordination.  Therefore,   for 24 hours, you have the following restrictions:   - DO NOT drive a car, operate machinery, make legal/financial decisions,   sign important papers or drink alcohol.    ACTIVITY:  Today: no heavy lifting, straining or running due to procedural   sedation/anesthesia.  The following day: return to full activity including work.  DIET:  Eat and drink normally unless instructed otherwise.     TREATMENT FOR COMMON SIDE EFFECTS:  - Mild abdominal pain, nausea, belching, bloating or excessive gas:  rest,   eat lightly and use a heating pad.  - Sore Throat: treat with throat lozenges and/or gargle with warm salt   water.  - Because air was used during the procedure, expelling large amounts of air   from your rectum or belching is normal.  - If a bowel prep was taken, you may not have a bowel movement for 1-3 days.    This is normal.  SYMPTOMS TO WATCH FOR AND REPORT TO YOUR PHYSICIAN:  1. Abdominal pain or bloating, other than gas cramps.  2. Chest pain.  3. Back pain.  4. Signs of infection such as: chills or fever occurring within 24 hours   after the procedure.  5. Rectal bleeding, which would show as bright red, maroon, or black stools.   (A tablespoon of blood from the rectum is not serious, especially if    hemorrhoids are present.)  6. Vomiting.  7. Weakness or dizziness.  GO DIRECTLY TO THE NEAREST EMERGENCY ROOM IF YOU HAVE ANY OF THE FOLLOWING:      Difficulty breathing              Chills and/or fever over 101 F   Persistent vomiting and/or vomiting blood   Severe abdominal pain   Severe chest pain   Black, tarry stools   Bleeding- more than one tablespoon   Any other symptom or condition that you feel may need urgent attention  Your doctor recommends these additional instructions:  If any biopsies were taken, your doctors clinic will contact you in 1 to 2   weeks with any results.  Recommendations:  - Return patient to hospital [facility] for ongoing care.   - Resume previous diet.   - Continue present medications.   - Please follow PEG recommendations: keep site clean, dry, and intact.   Maintain an abdominal binder at all times. Okay to use 4 hours post-PEG   placement (to allow anesthesia to wear off).  Impressions:  PEG  - Normal esophagus.   - Erythematous mucosa in the stomach.   - Normal examined duodenum.   - An endoscopically removable PEG placement was successfully completed.   - No specimens collected.  For questions, problems or results please call your physician - Dustin Priest MD at Work:  [phone].  [facility] ED at [phone]  IF A COMPLICATION OR EMERGENCY SITUATION ARISES AND YOU ARE UNABLE TO REACH   YOUR PHYSICIAN - GO DIRECTLY TO THE EMERGENCY ROOM.  MD Dustin Ball MD  6/13/2025 12:07:39 PM  This report has been verified and signed electronically.  Dear patient,  As a result of recent federal legislation (The Federal Cures Act), you may   receive lab or pathology results from your procedure in your [facility]   account before your physician is able to contact you. Your physician or   their representative will relay the results to you with their   recommendations at their soonest availability.  Thank you,  PROVATION

## 2025-06-13 NOTE — PROGRESS NOTES
[facility]  Hospital Medicine Progress Note        Chief Complaint: Inpatient Follow-up for     HPI:   64 year old with a history that includes meningioma, s/p resection 2020 and radiation x 2 (2009 and 2021), presented to ED 6/9 with worsening left sided weakness.  CT head non-specific findings in the right frontal lobe suspicious for acute infarct; in addition noting interval enlargement of left CP meningioma since 07/18/2024.   Patient was admitted to  services; Neurology and Neurosurgery.  Subsequent MRI ruled out acute CVA, noting residual meningioma at the cerebellopontine angle, larger than the prior examination and with associated surrounding edema. Patient started on IV decadron.  Neurosurgery on board.  Neurology signed off.      Patient failed swallow. Will consult GI for PEG tube. Continue steroid taper as per neurosurgery. Planned for PEG tube today.     Interval Hx:   NAEO. Seen and examined. Multiple family members at bedside. Planned for PEG tube today. All questions answered.     Case was discussed with patient's nurse and  on the floor.    Objective/physical exam:  General: In no acute distress, afebrile. NG tube in placed  Chest: Clear to auscultation bilaterally  Heart: RRR, +S1, S2, no appreciable murmur  Abdomen: Soft, nontender, BS +  MSK: Warm, no lower extremity edema, no clubbing or cyanosis  Neurologic: Alert and oriented x4, Cranial nerve II-XII intact, Strength 5/5 in all 4 extremities    VITAL SIGNS: 24 HRS MIN & MAX LAST   Temp  Min: 97.2 °F (36.2 °C)  Max: 98 °F (36.7 °C) 97.2 °F (36.2 °C)   BP  Min: 135/78  Max: 175/89 (!) 172/101   Pulse  Min: 78  Max: 88  86   Resp  Min: 18  Max: 19 19   SpO2  Min: 94 %  Max: 99 % 98 % (room air)     I have reviewed the following labs:  Recent Labs   Lab 06/10/25  0508 06/11/25  0345 06/13/25  0554   WBC 7.63 15.24* 12.02*   RBC 4.89 4.85 5.02   HGB 13.4 13.3 13.7   HCT 39.4 40.2 41.9   MCV 80.6 82.9 83.5    MCH 27.4 27.4 27.3   MCHC 34.0 33.1 32.7*   RDW 13.1 13.1 13.8    344 353   MPV 9.5 9.8 10.0     Recent Labs   Lab 06/09/25  1033 06/10/25  0508 06/11/25  0345 06/13/25  0554    140 140 141   K 3.3* 3.5 3.6 4.0    102 104 104   CO2 25 25 25 29   BUN 15.3 21.2* 35.8* 29.1*   CREATININE 0.90 0.77 0.85 0.71    141* 137* 129*   CALCIUM 10.4* 10.0 9.8 8.9   MG  --  1.90  --   --    ALBUMIN 4.2 3.7 3.7  --    PROT 8.4* 7.6 7.5  --    ALKPHOS 85 78 75  --    ALT 22 20 16  --    AST 26 21 17  --    BILITOT 0.7 0.4 0.5  --      Microbiology Results (last 7 days)       ** No results found for the last 168 hours. **             See below for Radiology    Assessment/Plan:  # Residual meningioma at the cerebellopontine angle appears larger than the prior examination and there is some increased surrounding edema.  # Old area of focal hemorrhage or calcification in the posterior cerebral convexity on the right side  # Hx of meningioma, s/p resection 2020 and radiation x 2 (2009 and 2021)  # HTN  # Dysphagia 2/2 meningioma  # Steroid induced leukocytosis    - neurology and neurosurgery recommendations appreciated   - plan to repeat scans in 6 weeks to determine if additional radiation is needed   - Continue decadron 4mg Q6H x 2 days, 2mg Q8H x 2 days, 2mg BID x 2 days, then continue 2mg daily till seen in clinic - will switch to 4mg q8hr 6/12  - consult GI for PEG tube. I spoke with Dr. Lagos; he thinks low probability of obtaining swallowing function with the steroids   - planned for PEG tube on 6/13  - IV PPI while on steroids  - continue diltiazem and losartan for BP. Will switch metop XL to coreg to help with BP control. Will start hydralazine 25 mg every 8 hours  - PT/OT/SLP  - aspiration precautions    VTE prophylaxis: SCD    Patient condition:  Guarded    Anticipated discharge and Disposition:         All diagnosis and differential diagnosis have been reviewed; assessment and plan has been  documented; I have personally reviewed the labs and test results that are presently available; I have reviewed the patients medication list; I have reviewed the consulting providers response and recommendations. I have reviewed or attempted to review medical records based upon their availability    All of the patient's questions have been  addressed and answered. Patient's is agreeable to the above stated plan. I will continue to monitor closely and make adjustments to medical management as needed.    _____________________________________________________________________    Malnutrition Status:  Nutrition consulted. Most recent weight and BMI monitored-     Measurements:  Wt Readings from Last 1 Encounters:   06/09/25 45.3 kg (99 lb 13.9 oz)   Body mass index is 18.27 kg/m².    Patient has been screened and assessed by RD.    Malnutrition Type:  Context: acute illness or injury  Level: severe    Malnutrition Characteristic Summary:  Weight Loss (Malnutrition): other (see comments) (Unable to assess)  Energy Intake (Malnutrition): less than or equal to 50% for greater than or equal to 5 days  Subcutaneous Fat (Malnutrition): mild depletion  Muscle Mass (Malnutrition): moderate depletion  Fluid Accumulation (Malnutrition): other (see comments) (Not present)    Interventions/Recommendations (treatment strategy):  Enteral nutrition;Oral nutritional supplement;Feeding assistance/management;Oral diet/nutrient modifications     Scheduled Med:   atorvastatin  40 mg Oral Daily    carvediloL  6.25 mg Oral BID    dexAMETHasone injection  4 mg Intravenous Q8H    diltiaZEM  180 mg Oral Daily    erythromycin   Left Eye QID    gabapentin  300 mg Oral TID    LIDOcaine (PF) 10 mg/ml (1%)        losartan  100 mg Oral Daily    pantoprazole  40 mg Intravenous Daily    polyethylene glycol  17 g Oral Daily    propofol          Continuous Infusions:     PRN Meds:    Current Facility-Administered Medications:     bisacodyL, 10 mg, Rectal,  Daily PRN    hydrALAZINE, 10 mg, Intravenous, Q4H PRN    labetalol, 20 mg, Intravenous, Q4H PRN    LIDOcaine (PF) 10 mg/ml (1%), , ,     propofol, , ,     sodium chloride 0.9%, 10 mL, Intravenous, PRN     Radiology:  I have personally reviewed the following imaging and agree with the radiologist.     CV Ultrasound Bilateral Doppler Carotid  The right internal carotid artery is patent with less than 50% stenosis.  The left internal carotid artery is patent with less than 50% stenosis.  Bilateral vertebral arteries are patent with antegrade flow.       Ian Elise MD  Department of Hospital Medicine   [facility]   06/13/2025

## 2025-06-13 NOTE — TRANSFER OF CARE
"Anesthesia Transfer of Care Note    Patient: Kaity Cuevas    Procedure(s) Performed: Procedure(s) (LRB):  PEG (N/A)    Patient location: GI    Anesthesia Type: general    Transport from OR: Transported from OR on room air with adequate spontaneous ventilation    Post pain: adequate analgesia    Post assessment: no apparent anesthetic complications    Post vital signs: stable    Level of consciousness: awake and sedated    Nausea/Vomiting: no nausea/vomiting    Complications: none    Transfer of care protocol was followed    Last vitals: Visit Vitals  BP (!) 172/101   Pulse 89   Temp 36 °C (96.8 °F)   Resp 14   Ht 5' 2" (1.575 m)   Wt 45.3 kg (99 lb 13.9 oz)   SpO2 99%   BMI 18.27 kg/m²     "

## 2025-06-13 NOTE — PT/OT/SLP PROGRESS
[facility]  Speech Language Pathology Department  Dysphagia Therapy Progress Note    Patient Name:  Kaity Cuevas   MRN:  [id number]    Recommendations     General recommendations:  dysphagia therapy  Solid texture recommendation:  NPO  Liquid consistency recommendation: NPO   Medications: NPO    Discharge therapy intensity: High Intensity Therapy   Barriers to safe discharge:  acuity of illness and severity of impairment    Subjective     Patient awake and alert. Pt received PEG placement today.  Spiritual/Cultural/Hindu Beliefs/Practices that affect care: no    Pain/Comfort: Pain Rating 1: 0/10    Objective     Oral Musculature  Oral care provided.     Therapeutic Activities:  Pt tolerated thermal stimulation to the anterior faucial pillars x15 with 100% swallow responses. Delay 5-12 seconds, increased today likely due to weakness and being tired.    Assessment     Pt continues to present with oropharyngeal dysphagia and remains unsafe for PO diet.    Outcome Measures     Functional Oral Intake Scale: 1 - Nothing by mouth    Goals     Multidisciplinary Problems       SLP Goals          Problem: SLP    Goal Priority Disciplines Outcome   SLP Goal     SLP Progressing   Description: LTG: The pt will tolerate least restrictive diet with no overt s/sx of aspiration.    STGs:  1. Patient will provide swallow response with delay < 2 seconds.  2. Patient will tolerate puree trials with no signs/sx of aspiration.  3. Patient will participate in repeat MBS.                       Patient Education     Patient and family were provided with verbal education regarding SLP POC.  Understanding was verbalized.    Plan     Will continue to follow and tx as appropriate.    SLP Follow-Up:  Yes   Patient to be seen:  daily   Plan of Care expires:  06/17/25  Plan of Care reviewed with:  patient       Time Tracking     SLP Treatment Date:   06/13/25  Speech Start Time:  1500  Speech Stop Time:  1510      Speech Total Time (min):  10 min    Billable minutes:  Treatment of Swallow Dysfunction, 10 minutes       06/13/2025

## 2025-06-13 NOTE — PT/OT/SLP PROGRESS
Physical Therapy Treatment    Patient Name:  Kaity Cuevas   MRN:  [id number]    Recommendations:     Discharge therapy intensity: High Intensity Therapy   Discharge Equipment Recommendations: to be determined by next level of care  Barriers to discharge: medical dx, impaired mobility, decreased independence     Assessment:     Kaity Cuevas is a 64 y.o. female admitted with a medical diagnosis of acute left sided weakness, residual meningoma-- increase in cerebellopontine angle mass .    She presents with the following impairments/functional limitations: weakness, gait instability, impaired endurance, impaired balance, impaired self care skills, impaired functional mobility, decreased lower extremity function.    Rehab Prognosis: Good; patient would benefit from acute skilled PT services to address these deficits and reach maximum level of function.    Recent Surgery: Procedure(s) (LRB):  PEG (N/A) * Day of Surgery *    Plan:     During this hospitalization, patient would benefit from acute PT services 6 x/week to address the identified rehab impairments via gait training, therapeutic activities, therapeutic exercises, neuromuscular re-education and progress toward the following goals:    Plan of Care Expires:  07/10/25    Subjective     Chief Complaint: n/a  Patient/Family Comments/goals: to return PLOF   Pain/Comfort:  Pain Rating 1: 0/10      Objective:     Communicated with NSG prior to session.  Patient found HOB elevated with NG tube, peripheral IV, PureWick upon PT entry to room.     General Precautions: Standard, fall (SBP<160)  Orthopedic Precautions: N/A  Braces: N/A  Respiratory Status: Room air  Blood Pressure: 168/84- ok'd for mobility  Skin Integrity: Visible skin intact      Functional Mobility:  Bed Mobility:     Supine to Sit: stand by assistance  Transfers:     Sit to Stand:  minimum assistance with rolling walker; x1 from EOB, x1 from chair  Gait:   Pt ambulates approx 44 ft with use of  RW and [place]; step to pattern with NBOS; L lateral trunk lean; TC for RW management 2/2 veer to R; decreased sonia  Seated rest break occurs  Pt then ambulates and additional 44 ft with use of RW and [place]; this trial pt is instructed to keep each LE in a different colored tile in order to promote a more normalized TONE; however, still remains with L lateral trunk lean; decreased sonia      Co-Treatment: No    Education:  Patient and spouse were provided with verbal education  regarding PT role/goals/POC, fall prevention, safety awareness, and discharge/DME recommendations.  Understanding was verbalized.     Patient left up in chair with all lines intact, call button in reach, geomat cushion, and spouse present    GOALS:   Multidisciplinary Problems       Physical Therapy Goals          Problem: Physical Therapy    Goal Priority Disciplines Outcome Interventions   Physical Therapy Goal     PT, PT/OT Progressing    Description: Goals to be met by: 7/10/2025     Patient will increase functional independence with mobility by performin. Supine to sit with [place]  2. Sit to supine with [place]  3. Sit to stand transfer with [place]  4. Gait  x 100 feet with [place] using No Assistive Device.                          Time Tracking:     PT Received On: 25  PT Start Time: 822     PT Stop Time: 08  PT Total Time (min): 31 min     Billable Minutes: Gait Training 2    Treatment Type: Treatment  PT/PTA: PT     Number of PTA visits since last PT visit: 3     2025

## 2025-06-13 NOTE — PT/OT/SLP PROGRESS
Occupational Therapy      Patient Name:  Kaity Cuevas   MRN:  [id number]    Attempted OT session this morning prior to PEG procedure, however RN requesting hold due to pt's /84. Transport also present to take pt to procedure. Will follow-up as schedule allows.    6/13/2025

## 2025-06-13 NOTE — PROGRESS NOTES
Inpatient Nutrition Assessment    Admit Date: 6/9/2025   Total duration of encounter: 4 days   Patient Age: 64 y.o.    Nutrition Recommendation/Prescription     Continue NPO until cleared for oral diet. Texture modifications per SLP.     Once ok to start tube feedings through PEG, being rate at 25mL/hr and advance to goal after 4hr if tolerating.   Goal tube feeding recommendations   Isosource HN goal rate 55mL/hr + 50mL q2hr water flush will provide  1320kcals/d (83% est needs)  59g protein/d (88% est needs)  1391mL fl/d (102% est needs)  (calculations based on estimated 20 hr/d run time)     Monitor and replace electrolytes as need.     Bowel regimen as feasible.    Communication of Recommendations: reviewed with nurse, reviewed with patient, and reviewed with family    Nutrition Assessment     Malnutrition Assessment/Nutrition-Focused Physical Exam    Malnutrition Context: acute illness or injury (06/10/25 1043)  Malnutrition Level: severe (06/10/25 1043)  Energy Intake (Malnutrition): less than or equal to 50% for greater than or equal to 5 days (06/10/25 1043)  Weight Loss (Malnutrition): other (see comments) (Unable to assess) (06/10/25 1043)  Subcutaneous Fat (Malnutrition): mild depletion (06/10/25 1043)  Orbital Region (Subcutaneous Fat Loss): mild depletion  Upper Arm Region (Subcutaneous Fat Loss): mild depletion     Muscle Mass (Malnutrition): moderate depletion (06/10/25 1043)  Jew Region (Muscle Loss): moderate depletion  Clavicle Bone Region (Muscle Loss): moderate depletion                    Fluid Accumulation (Malnutrition): other (see comments) (Not present) (06/10/25 1043)        A minimum of two characteristics is recommended for diagnosis of either severe or non-severe malnutrition.    Chart Review    Reason Seen: malnutrition screening tool (MST) and follow-up    Malnutrition Screening Tool Results   Have you recently lost weight without trying?: Unsure  Have you been eating poorly because  of a decreased appetite?: Yes   MST Score: 3   Diagnosis:  Worsening left facial droop, left-sided weakness  History of benign meningioma resected in 2020 followed by Neurosurgery with serial MRI  --with chronic left facial deficits as well as left sided weakness  HLD  Hypertension    Relevant Medical History: Benign meningioma, Carpal tunnel syndrome on right, Cataract, Cervical disc displacement, Cervical spondylosis, Hard of hearing, Hyperlipidemia, Hypertension, Neoplasm, brain, Nephrolithiasis, Neurotrophic keratoconjunctivitis, and Osteoporosis     Scheduled Medications:  atorvastatin, 40 mg, Daily  carvediloL, 6.25 mg, BID  ceFAZolin, ,   dexAMETHasone injection, 4 mg, Q8H  diltiaZEM, 180 mg, Daily  erythromycin, , QID  gabapentin, 300 mg, TID  hydrALAZINE, 25 mg, Q8H  losartan, 100 mg, Daily  pantoprazole, 40 mg, Daily  polyethylene glycol, 17 g, Daily    Continuous Infusions:   PRN Medications:  bisacodyL, 10 mg, Daily PRN  ceFAZolin, ,   hydrALAZINE, 10 mg, Q4H PRN  labetalol, 20 mg, Q4H PRN  sodium chloride 0.9%, 10 mL, PRN    Calorie Containing IV Medications: no significant kcals from medications at this time    Recent Labs   Lab 06/09/25  1033 06/10/25  0508 06/11/25  0345 06/13/25  0554    140 140 141   K 3.3* 3.5 3.6 4.0   CALCIUM 10.4* 10.0 9.8 8.9   PHOS  --  4.3  --   --    MG  --  1.90  --   --     102 104 104   CO2 25 25 25 29   BUN 15.3 21.2* 35.8* 29.1*   CREATININE 0.90 0.77 0.85 0.71   EGFRNORACEVR >60 >60 >60 >60    141* 137* 129*   BILITOT 0.7 0.4 0.5  --    ALKPHOS 85 78 75  --    ALT 22 20 16  --    AST 26 21 17  --    ALBUMIN 4.2 3.7 3.7  --    TRIG 73  --   --   --    WBC 10.92 7.63 15.24* 12.02*   HGB 14.6 13.4 13.3 13.7   HCT 45.0 39.4 40.2 41.9     Nutrition Orders:  Diet NPO  Tube Feedings/Formulas 55; 1,100; Isosource HN; NG; 50; Every 2 hours  Appetite/Oral Intake: not applicable/NPO  Factors Affecting Nutritional Intake: chewing difficulty, difficulty/impaired  "swallowing, and NPO  Social Needs Impacting Access to Food: none identified  Food/Shinto/Cultural Preferences: vanilla or strawberry oral supplement   Food Allergies: none reported  Last Bowel Movement: 25  Wound(s):      Comments    6/10/25 Reports poor appetite since 25, with nausea, vomiting and increased difficultly chewing/swallowing food. She did have a fair appetite prior to this, oral intake was inconsistent. Left side deficit from resected meningioma sx in , would use right side of mouth for chewing without difficultly up until this past weekend. Family reports UBW ~130lbs in 2020 and has slowly lost unintentional weight. Thinks weight stable ~110lbs x1 year. Admit weight of 99lbs, unsure time frame of 10% weight loss. Will drink vanilla or strawberry ONS once diet resumed. Tube feeding recommendation above if unable to resume oral diet.     25 Tolerating tube feedings at 35mL/hr this morning. Denies any GI complaints.     25 PEG placement today. Was tolerating tube feedings at goal. Last BM , receiving stool softeners.     Anthropometrics    Height: 5' 2" (157.5 cm),    Last Weight: 45.3 kg (99 lb 13.9 oz) (25 1017), Weight Method: Standard Scale   BMI 18.27kg/m^2  BMI Classification: underweight (BMI less than 18.5)     Ideal Body Weight (IBW), Female: 110 lb                          Usual Body Weight (UBW), k kg  % Usual Body Weight: 90.79     Usual Weight Provided By: patient and family/caregiver    Wt Readings from Last 5 Encounters:   25 45.3 kg (99 lb 13.9 oz)   24 47.3 kg (104 lb 3.2 oz)   24 49.8 kg (109 lb 12.6 oz)   24 49.9 kg (110 lb 0.2 oz)   23 49.9 kg (110 lb)     Weight Change(s) Since Admission:   Wt Readings from Last 1 Encounters:   25 1017 45.3 kg (99 lb 13.9 oz)   Admit Weight: 45.3 kg (99 lb 13.9 oz) (25 1017), Weight Method: Standard Scale    Estimated Needs    Weight Used For Calorie Calculations: 45.3 " kg (99 lb 13.9 oz)  Energy Calorie Requirements (kcal): 1585-1812kacls/d (35-40kcals/kg) for weight gain  Energy Need Method: Kcal/kg  Weight Used For Protein Calculations: 45.3 kg (99 lb 13.9 oz)  Protein Requirements: 68g/d (1.5g/kg)   Fluid Requirements (mL): 1359mL fl/d (30ml/kg)        Enteral Nutrition  6/13/25 TF on hold for PEG placement   Formula: Isosource HN  Rate/Volume:   Water Flushes:   Additives/Modulars: none at this time  Route: PEG tube  Method: continuous  Total Nutrition Provided by Tube Feeding, Additives, and Flushes:  Calories Provided   kcal/d, % needs   Protein Provided   g/d, % needs   Fluid Provided   ml/d, % needs   Continuous feeding calculations based on estimated 20 hr/d run time unless otherwise stated.    Parenteral Nutrition     Patient not receiving parenteral nutrition support at this time.    Evaluation of Received Nutrient Intake    Calories: not meeting estimated needs  Protein: not meeting estimated needs    Patient Education     Not applicable.    Nutrition Diagnosis     PES: Inadequate energy intake related to inability to consume sufficient nutrients as evidenced by likely <50% EER since 6/6/25. (active)     PES: Severe acute disease or injury related malnutrition Related to inability to consume sufficient nutrients  As Evidenced by:  - weight loss: Unable to assess - energy intake: <= 50% for 5 days (meets criteria for <= 50% >= 5 days (severe - acute)) - muscle mass depletion: 2 areas of moderate muscle loss (Temporalis, Clavicle) - loss of subcutaneous fat: 3 areas of mild fat loss (Buccal, Infraorbital, Triceps Skinfold) new    Nutrition Interventions     Intervention(s): collaboration with other providers  Intervention(s): Enteral nutrition;Oral nutritional supplement;Feeding assistance/management;Oral diet/nutrient modifications    Goal: Meet greater than 80% of nutritional needs by follow-up. (goal progressing)  Nutrition Goals & Monitoring     Dietitian will  monitor: energy intake, enteral nutrition intake, weight change, electrolyte/renal panel, beliefs/attitudes, glucose/endocrine profile, and gastrointestinal profile  Discharge planning: tube feeding (Isosource HN/Osmolite 1.2 or equivalent)  Nutrition Risk/Follow-Up: patient at increased nutrition risk; dietitian will follow-up twice weekly   Please consult if re-assessment needed sooner.

## 2025-06-13 NOTE — ANESTHESIA PREPROCEDURE EVALUATION
"                                                                                                             06/13/2025  Kaity Cuevas is a 64 y.o., female with multiple medical problems including hx of stroke, left side weakness, and severe malnutrition.  She is here today for PEG tube placement.  She is calm in pre-op holding area today, and she answers questions appropriately.  She has done well in the past under anesthesia.    "64 year old with a history that includes meningioma, s/p resection 2020 and radiation x 2 (2009 and 2021), presented to ED 6/9 with worsening left sided weakness.  CT head non-specific findings in the right frontal lobe suspicious for acute infarct; in addition noting interval enlargement of left CP meningioma since 07/18/2024.   Patient was admitted to  services; Neurology and Neurosurgery.  Subsequent MRI ruled out acute CVA, noting residual meningioma at the cerebellopontine angle, larger than the prior examination and with associated surrounding edema. Patient started on IV decadron.  Neurosurgery on board.  Neurology signed off.       Patient failed swallow. Will consult GI for PEG tube. Continue steroid taper as per neurosurgery.      ...    Assessment/Plan:  # Residual meningioma at the cerebellopontine angle appears larger than the prior examination and there is some increased surrounding edema.  # Old area of focal hemorrhage or calcification in the posterior cerebral convexity on the right side  # Hx of meningioma, s/p resection 2020 and radiation x 2 (2009 and 2021)  # HTN  # Dysphagia 2/2 meningioma  # Steroid induced leukocytosis     - neurology and neurosurgery recommendations appreciated                 - plan to repeat scans in 6 weeks to determine if additional radiation is needed                 - Continue decadron 4mg Q6H x 2 days, 2mg Q8H x 2 days, 2mg BID x 2 days, then continue 2mg daily till seen in clinic - will switch to 4mg q8hr today 6/12  - consult GI " "for PEG tube. I spoke with Dr. Lagos; he thinks low probability of obtaining swallowing function with the steroids  - IV PPI while on steroids  - continue diltiazem and losartan for BP. Will switch metop XL to coreg to help with BP control  - PT/OT/SLP  - aspiration precautions"    Pre-op Assessment    I have reviewed the Patient Summary Reports.     I have reviewed the Nursing Notes. I have reviewed the NPO Status.   I have reviewed the Medications.     Review of Systems  Anesthesia Hx:  No problems with previous Anesthesia                Cardiovascular:  Exercise tolerance: good   Hypertension                                          Renal/:  Chronic Renal Disease                Neurological:   CVA Neuromuscular Disease,                                       Physical Exam  General: Well nourished and Cooperative    Airway:  Mallampati: II   Mouth Opening: Normal  TM Distance: Normal  Tongue: Normal  Neck ROM: Normal ROM    Chest/Lungs:  Clear to auscultation    Heart:  Rate: Normal        Anesthesia Plan  Type of Anesthesia, risks & benefits discussed:    Anesthesia Type: Gen Natural Airway  Intra-op Monitoring Plan: Standard ASA Monitors  Induction:  IV  Informed Consent: Informed consent signed with the Patient and all parties understand the risks and agree with anesthesia plan.  All questions answered.   ASA Score: 3  Day of Surgery Review of History & Physical: H&P Update referred to the surgeon/provider.    Ready For Surgery From Anesthesia Perspective.     .         "

## 2025-06-14 LAB
ANION GAP SERPL CALC-SCNC: 7 MEQ/L
BASOPHILS # BLD AUTO: 0.01 X10(3)/MCL
BASOPHILS NFR BLD AUTO: 0.1 %
BUN SERPL-MCNC: 29.4 MG/DL (ref 9.8–20.1)
CALCIUM SERPL-MCNC: 8.1 MG/DL (ref 8.4–10.2)
CHLORIDE SERPL-SCNC: 107 MMOL/L (ref 98–107)
CO2 SERPL-SCNC: 27 MMOL/L (ref 23–31)
CREAT SERPL-MCNC: 0.65 MG/DL (ref 0.55–1.02)
CREAT/UREA NIT SERPL: 45
EOSINOPHIL # BLD AUTO: 0 X10(3)/MCL (ref 0–0.9)
EOSINOPHIL NFR BLD AUTO: 0 %
ERYTHROCYTE [DISTWIDTH] IN BLOOD BY AUTOMATED COUNT: 13.9 % (ref 11.5–17)
GFR SERPLBLD CREATININE-BSD FMLA CKD-EPI: >60 ML/MIN/1.73/M2
GLUCOSE SERPL-MCNC: 133 MG/DL (ref 82–115)
HCT VFR BLD AUTO: 40.4 % (ref 37–47)
HGB BLD-MCNC: 13.2 G/DL (ref 12–16)
IMM GRANULOCYTES # BLD AUTO: 0.06 X10(3)/MCL (ref 0–0.04)
IMM GRANULOCYTES NFR BLD AUTO: 0.6 %
LYMPHOCYTES # BLD AUTO: 0.62 X10(3)/MCL (ref 0.6–4.6)
LYMPHOCYTES NFR BLD AUTO: 6.2 %
MCH RBC QN AUTO: 27.4 PG (ref 27–31)
MCHC RBC AUTO-ENTMCNC: 32.7 G/DL (ref 33–36)
MCV RBC AUTO: 83.8 FL (ref 80–94)
MONOCYTES # BLD AUTO: 0.8 X10(3)/MCL (ref 0.1–1.3)
MONOCYTES NFR BLD AUTO: 8 %
NEUTROPHILS # BLD AUTO: 8.55 X10(3)/MCL (ref 2.1–9.2)
NEUTROPHILS NFR BLD AUTO: 85.1 %
NRBC BLD AUTO-RTO: 0 %
PLATELET # BLD AUTO: 305 X10(3)/MCL (ref 130–400)
PMV BLD AUTO: 10.1 FL (ref 7.4–10.4)
POTASSIUM SERPL-SCNC: 4.3 MMOL/L (ref 3.5–5.1)
RBC # BLD AUTO: 4.82 X10(6)/MCL (ref 4.2–5.4)
SODIUM SERPL-SCNC: 141 MMOL/L (ref 136–145)
WBC # BLD AUTO: 10.04 X10(3)/MCL (ref 4.5–11.5)

## 2025-06-14 PROCEDURE — 36415 COLL VENOUS BLD VENIPUNCTURE: CPT

## 2025-06-14 PROCEDURE — 85025 COMPLETE CBC W/AUTO DIFF WBC: CPT

## 2025-06-14 PROCEDURE — 63600175 PHARM REV CODE 636 W HCPCS: Performed by: INTERNAL MEDICINE

## 2025-06-14 PROCEDURE — 80048 BASIC METABOLIC PNL TOTAL CA: CPT

## 2025-06-14 PROCEDURE — 21400001 HC TELEMETRY ROOM

## 2025-06-14 PROCEDURE — 63600175 PHARM REV CODE 636 W HCPCS

## 2025-06-14 PROCEDURE — 25000003 PHARM REV CODE 250

## 2025-06-14 RX ORDER — LOSARTAN POTASSIUM 50 MG/1
100 TABLET ORAL DAILY
Status: DISCONTINUED | OUTPATIENT
Start: 2025-06-14 | End: 2025-06-20 | Stop reason: HOSPADM

## 2025-06-14 RX ORDER — ATORVASTATIN CALCIUM 40 MG/1
40 TABLET, FILM COATED ORAL DAILY
Status: DISCONTINUED | OUTPATIENT
Start: 2025-06-14 | End: 2025-06-20 | Stop reason: HOSPADM

## 2025-06-14 RX ORDER — CARVEDILOL 12.5 MG/1
12.5 TABLET ORAL 2 TIMES DAILY
Status: DISCONTINUED | OUTPATIENT
Start: 2025-06-14 | End: 2025-06-20 | Stop reason: HOSPADM

## 2025-06-14 RX ORDER — HYDRALAZINE HYDROCHLORIDE 25 MG/1
25 TABLET, FILM COATED ORAL EVERY 8 HOURS
Status: DISCONTINUED | OUTPATIENT
Start: 2025-06-14 | End: 2025-06-20 | Stop reason: HOSPADM

## 2025-06-14 RX ORDER — CARVEDILOL 3.12 MG/1
6.25 TABLET ORAL 2 TIMES DAILY
Status: DISCONTINUED | OUTPATIENT
Start: 2025-06-14 | End: 2025-06-14

## 2025-06-14 RX ORDER — DEXAMETHASONE SODIUM PHOSPHATE 4 MG/ML
2 INJECTION, SOLUTION INTRA-ARTICULAR; INTRALESIONAL; INTRAMUSCULAR; INTRAVENOUS; SOFT TISSUE EVERY 12 HOURS
Status: COMPLETED | OUTPATIENT
Start: 2025-06-14 | End: 2025-06-16

## 2025-06-14 RX ORDER — GABAPENTIN 300 MG/1
300 CAPSULE ORAL 3 TIMES DAILY
Status: DISCONTINUED | OUTPATIENT
Start: 2025-06-14 | End: 2025-06-20 | Stop reason: HOSPADM

## 2025-06-14 RX ORDER — POLYETHYLENE GLYCOL 3350 17 G/17G
17 POWDER, FOR SOLUTION ORAL DAILY
Status: DISCONTINUED | OUTPATIENT
Start: 2025-06-14 | End: 2025-06-20 | Stop reason: HOSPADM

## 2025-06-14 RX ADMIN — PANTOPRAZOLE SODIUM 40 MG: 40 INJECTION, POWDER, FOR SOLUTION INTRAVENOUS at 08:06

## 2025-06-14 RX ADMIN — GABAPENTIN 300 MG: 300 CAPSULE ORAL at 08:06

## 2025-06-14 RX ADMIN — ERYTHROMYCIN: 5 OINTMENT OPHTHALMIC at 08:06

## 2025-06-14 RX ADMIN — CARVEDILOL 12.5 MG: 12.5 TABLET, FILM COATED ORAL at 08:06

## 2025-06-14 RX ADMIN — DEXAMETHASONE SODIUM PHOSPHATE 2 MG: 4 INJECTION, SOLUTION INTRA-ARTICULAR; INTRALESIONAL; INTRAMUSCULAR; INTRAVENOUS; SOFT TISSUE at 08:06

## 2025-06-14 RX ADMIN — ATORVASTATIN CALCIUM 40 MG: 40 TABLET, FILM COATED ORAL at 08:06

## 2025-06-14 RX ADMIN — HYDRALAZINE HYDROCHLORIDE 25 MG: 25 TABLET ORAL at 08:06

## 2025-06-14 RX ADMIN — HYDRALAZINE HYDROCHLORIDE 25 MG: 25 TABLET ORAL at 05:06

## 2025-06-14 RX ADMIN — DEXAMETHASONE SODIUM PHOSPHATE 4 MG: 4 INJECTION, SOLUTION INTRA-ARTICULAR; INTRALESIONAL; INTRAMUSCULAR; INTRAVENOUS; SOFT TISSUE at 05:06

## 2025-06-14 RX ADMIN — HYDRALAZINE HYDROCHLORIDE 10 MG: 20 INJECTION INTRAMUSCULAR; INTRAVENOUS at 04:06

## 2025-06-14 RX ADMIN — CARVEDILOL 6.25 MG: 3.12 TABLET, FILM COATED ORAL at 08:06

## 2025-06-14 RX ADMIN — HYDRALAZINE HYDROCHLORIDE 25 MG: 25 TABLET ORAL at 01:06

## 2025-06-14 RX ADMIN — GABAPENTIN 300 MG: 300 CAPSULE ORAL at 04:06

## 2025-06-14 RX ADMIN — POLYETHYLENE GLYCOL 3350 17 G: 17 POWDER, FOR SOLUTION ORAL at 08:06

## 2025-06-14 RX ADMIN — LABETALOL HYDROCHLORIDE 20 MG: 5 INJECTION, SOLUTION INTRAVENOUS at 04:06

## 2025-06-14 RX ADMIN — ERYTHROMYCIN: 5 OINTMENT OPHTHALMIC at 01:06

## 2025-06-14 RX ADMIN — LOSARTAN POTASSIUM 100 MG: 50 TABLET, FILM COATED ORAL at 08:06

## 2025-06-14 RX ADMIN — ERYTHROMYCIN: 5 OINTMENT OPHTHALMIC at 05:06

## 2025-06-14 NOTE — PLAN OF CARE
Problem: Adult Inpatient Plan of Care  Goal: Patient-Specific Goal (Individualized)  Outcome: Progressing  Goal: Absence of Hospital-Acquired Illness or Injury  Outcome: Progressing  Goal: Optimal Comfort and Wellbeing  Outcome: Progressing  Intervention: Provide Person-Centered Care  Flowsheets (Taken 6/14/2025 1702)  Trust Relationship/Rapport:   care explained   empathic listening provided     Problem: Stroke, Ischemic (Includes Transient Ischemic Attack)  Goal: Optimal Coping  Outcome: Progressing  Intervention: Support Psychosocial Response to Stroke  Flowsheets (Taken 6/14/2025 1702)  Supportive Measures: self-care encouraged

## 2025-06-14 NOTE — PROGRESS NOTES
[facility]  Hospital Medicine Progress Note        Chief Complaint: Inpatient Follow-up for     HPI:   64 year old with a history that includes meningioma, s/p resection 2020 and radiation x 2 (2009 and 2021), presented to ED 6/9 with worsening left sided weakness.  CT head non-specific findings in the right frontal lobe suspicious for acute infarct; in addition noting interval enlargement of left CP meningioma since 07/18/2024.   Patient was admitted to  services; Neurology and Neurosurgery.  Subsequent MRI ruled out acute CVA, noting residual meningioma at the cerebellopontine angle, larger than the prior examination and with associated surrounding edema. Patient started on IV decadron.  Neurosurgery on board.  Neurology signed off.      Patient failed swallow. Will consult GI for PEG tube. Continue steroid taper as per neurosurgery. S/p PEG tube on 6/13.    Interval Hx:   Had mild left sided chest pain yesterday evening that resolved. Otherwise doing ok. Sister at bedside.     Case was discussed with patient's nurse and  on the floor.    Objective/physical exam:  General: In no acute distress, afebrile. NG tube in placed  Chest: Clear to auscultation bilaterally  Heart: RRR, +S1, S2, no appreciable murmur  Abdomen: Soft, nontender, BS +  MSK: Warm, no lower extremity edema, no clubbing or cyanosis  Neurologic: Alert and oriented x4, Cranial nerve II-XII intact, Strength 5/5 in all 4 extremities    VITAL SIGNS: 24 HRS MIN & MAX LAST   Temp  Min: 97.4 °F (36.3 °C)  Max: 98.5 °F (36.9 °C) 97.8 °F (36.6 °C)   BP  Min: 125/70  Max: 193/94 (!) 160/77   Pulse  Min: 80  Max: 102  84   Resp  Min: 18  Max: 18 18   SpO2  Min: 96 %  Max: 100 % 96 %     I have reviewed the following labs:  Recent Labs   Lab 06/11/25  0345 06/13/25  0554 06/14/25  0344   WBC 15.24* 12.02* 10.04   RBC 4.85 5.02 4.82   HGB 13.3 13.7 13.2   HCT 40.2 41.9 40.4   MCV 82.9 83.5 83.8   MCH 27.4 27.3 27.4    MCHC 33.1 32.7* 32.7*   RDW 13.1 13.8 13.9    353 305   MPV 9.8 10.0 10.1     Recent Labs   Lab 06/09/25  1033 06/10/25  0508 06/11/25  0345 06/13/25  0554 06/14/25  0344    140 140 141 141   K 3.3* 3.5 3.6 4.0 4.3    102 104 104 107   CO2 25 25 25 29 27   BUN 15.3 21.2* 35.8* 29.1* 29.4*   CREATININE 0.90 0.77 0.85 0.71 0.65    141* 137* 129* 133*   CALCIUM 10.4* 10.0 9.8 8.9 8.1*   MG  --  1.90  --   --   --    ALBUMIN 4.2 3.7 3.7  --   --    PROT 8.4* 7.6 7.5  --   --    ALKPHOS 85 78 75  --   --    ALT 22 20 16  --   --    AST 26 21 17  --   --    BILITOT 0.7 0.4 0.5  --   --      Microbiology Results (last 7 days)       ** No results found for the last 168 hours. **             See below for Radiology    Assessment/Plan:  # Residual meningioma at the cerebellopontine angle appears larger than the prior examination and there is some increased surrounding edema.  # Old area of focal hemorrhage or calcification in the posterior cerebral convexity on the right side  # Hx of meningioma, s/p resection 2020 and radiation x 2 (2009 and 2021)  # HTN  # Dysphagia 2/2 meningioma  # Steroid induced leukocytosis    - neurology and neurosurgery recommendations appreciated   - plan to repeat scans in 6 weeks to determine if additional radiation is needed   - Continue decadron 4mg Q6H x 2 days, 2mg Q8H x 2 days, 2mg BID x 2 days, then continue 2mg daily till seen in clinic - will switch to 2mg q8hr 6/14  - consult GI for PEG tube. I spoke with Dr. Lagos; he thinks low probability of obtaining swallowing function with the steroids   - s/p PEG tube on 6/13   - started on tube feeds this morning  - IV PPI while on steroids  - continue losartan 100 mg daily. Continue hydralazine 25 mg q8hr. Continue home diltiazem and increase coreg 12.5mg BID  - PT/OT/SLP  - aspiration precautions  - labs noted today. Will monitor every 48 hours    VTE prophylaxis: SCD    Patient condition:  Guarded    Anticipated  discharge and Disposition:         All diagnosis and differential diagnosis have been reviewed; assessment and plan has been documented; I have personally reviewed the labs and test results that are presently available; I have reviewed the patients medication list; I have reviewed the consulting providers response and recommendations. I have reviewed or attempted to review medical records based upon their availability    All of the patient's questions have been  addressed and answered. Patient's is agreeable to the above stated plan. I will continue to monitor closely and make adjustments to medical management as needed.    _____________________________________________________________________    Malnutrition Status:  Nutrition consulted. Most recent weight and BMI monitored-     Measurements:  Wt Readings from Last 1 Encounters:   06/09/25 45.3 kg (99 lb 13.9 oz)   Body mass index is 18.27 kg/m².    Patient has been screened and assessed by RD.    Malnutrition Type:  Context: acute illness or injury  Level: severe    Malnutrition Characteristic Summary:  Weight Loss (Malnutrition): other (see comments) (Unable to assess)  Energy Intake (Malnutrition): less than or equal to 50% for greater than or equal to 5 days  Subcutaneous Fat (Malnutrition): mild depletion  Muscle Mass (Malnutrition): moderate depletion  Fluid Accumulation (Malnutrition): other (see comments) (Not present)    Interventions/Recommendations (treatment strategy):  Enteral nutrition;Oral nutritional supplement;Feeding assistance/management;Oral diet/nutrient modifications     Scheduled Med:   atorvastatin  40 mg Per G Tube Daily    carvediloL  6.25 mg Per G Tube BID    dexAMETHasone injection  4 mg Intravenous Q8H    diltiaZEM  180 mg Oral Daily    erythromycin   Left Eye QID    gabapentin  300 mg Per G Tube TID    hydrALAZINE  25 mg Per G Tube Q8H    losartan  100 mg Per G Tube Daily    pantoprazole  40 mg Intravenous Daily    polyethylene glycol   17 g Per G Tube Daily      Continuous Infusions:     PRN Meds:    Current Facility-Administered Medications:     bisacodyL, 10 mg, Rectal, Daily PRN    hydrALAZINE, 10 mg, Intravenous, Q4H PRN    labetalol, 20 mg, Intravenous, Q4H PRN    sodium chloride 0.9%, 10 mL, Intravenous, PRN     Radiology:  I have personally reviewed the following imaging and agree with the radiologist.     CV Ultrasound Bilateral Doppler Carotid  The right internal carotid artery is patent with less than 50% stenosis.  The left internal carotid artery is patent with less than 50% stenosis.  Bilateral vertebral arteries are patent with antegrade flow.       Ian Elise MD  Department of Hospital Medicine   [facility]   06/14/2025

## 2025-06-14 NOTE — PROGRESS NOTES
"Gastroenterology Progress Note    Subjective/Interval History:  Pt awake in bed  Insertion site a little tender  Site CDI  Binder on, however way to large for pt  No acute changes    ROS:  12 point system reviewed and is negative except as noted in HPI    Vital Signs:  BP (!) 160/77   Pulse 84   Temp 97.8 °F (36.6 °C) (Oral)   Resp 18   Ht 5' 2" (1.575 m)   Wt 45.3 kg (99 lb 13.9 oz)   SpO2 96%   BMI 18.27 kg/m²   Body mass index is 18.27 kg/m².    Physical Exam:  Constitutional:       General: She is not in acute distress.     Appearance: She is normal weight. She is ill-appearing. She is not toxic-appearing.   HENT:      Head: Normocephalic and atraumatic.      Mouth/Throat:      Mouth: Mucous membranes are dry.      Pharynx: Oropharynx is clear.      Comments: Facial droop  Cardiovascular:      Rate and Rhythm: Normal rate and regular rhythm.      Pulses: Normal pulses.      Heart sounds: Normal heart sounds.   Pulmonary:      Effort: Pulmonary effort is normal.      Breath sounds: Normal breath sounds.   Abdominal:      General: Bowel sounds are normal. There is no distension.      Palpations: Abdomen is soft. There is no mass.      Tenderness: There is no abdominal tenderness. There is no guarding.      Hernia: No hernia is present.      Comments: NGT noted   Skin:     General: Skin is warm and dry.   Neurological:      Mental Status: She is alert and oriented to person, place, and time.   Psychiatric:         Mood and Affect: Mood normal.         Behavior: Behavior normal.         Thought Content: Thought content normal.         Judgment: Judgment normal.     Labs:  Recent Results (from the past 48 hours)   POCT glucose    Collection Time: 06/12/25  8:34 PM   Result Value Ref Range    POCT Glucose 150 (H) 70 - 110 mg/dL   Basic Metabolic Panel    Collection Time: 06/13/25  5:54 AM   Result Value Ref Range    Sodium 141 136 - 145 mmol/L    Potassium 4.0 3.5 - 5.1 mmol/L    Chloride 104 98 - 107 mmol/L    " CO2 29 23 - 31 mmol/L    Glucose 129 (H) 82 - 115 mg/dL    Blood Urea Nitrogen 29.1 (H) 9.8 - 20.1 mg/dL    Creatinine 0.71 0.55 - 1.02 mg/dL    BUN/Creatinine Ratio 41     Calcium 8.9 8.4 - 10.2 mg/dL    Anion Gap 8.0 mEq/L    eGFR >60 mL/min/1.73/m2   Protime-INR    Collection Time: 06/13/25  5:54 AM   Result Value Ref Range    PT 13.1 12.5 - 14.5 seconds    INR 1.0 <=1.3   CBC with Differential    Collection Time: 06/13/25  5:54 AM   Result Value Ref Range    WBC 12.02 (H) 4.50 - 11.50 x10(3)/mcL    RBC 5.02 4.20 - 5.40 x10(6)/mcL    Hgb 13.7 12.0 - 16.0 g/dL    Hct 41.9 37.0 - 47.0 %    MCV 83.5 80.0 - 94.0 fL    MCH 27.3 27.0 - 31.0 pg    MCHC 32.7 (L) 33.0 - 36.0 g/dL    RDW 13.8 11.5 - 17.0 %    Platelet 353 130 - 400 x10(3)/mcL    MPV 10.0 7.4 - 10.4 fL    Neut % 86.0 %    Lymph % 5.7 %    Mono % 7.6 %    Eos % 0.0 %    Basophil % 0.1 %    Imm Grans % 0.6 %    Neut # 10.34 (H) 2.1 - 9.2 x10(3)/mcL    Lymph # 0.69 0.6 - 4.6 x10(3)/mcL    Mono # 0.91 0.1 - 1.3 x10(3)/mcL    Eos # 0.00 0 - 0.9 x10(3)/mcL    Baso # 0.01 <=0.2 x10(3)/mcL    Imm Gran # 0.07 (H) 0.00 - 0.04 x10(3)/mcL    NRBC% 0.0 %   EKG 12-lead    Collection Time: 06/13/25  5:32 PM   Result Value Ref Range    QRS Duration 92 ms    [facility] QTC Calculation 490 ms   Troponin I    Collection Time: 06/13/25  5:56 PM   Result Value Ref Range    Troponin-I <0.010 0.000 - 0.045 ng/mL   Basic Metabolic Panel    Collection Time: 06/14/25  3:44 AM   Result Value Ref Range    Sodium 141 136 - 145 mmol/L    Potassium 4.3 3.5 - 5.1 mmol/L    Chloride 107 98 - 107 mmol/L    CO2 27 23 - 31 mmol/L    Glucose 133 (H) 82 - 115 mg/dL    Blood Urea Nitrogen 29.4 (H) 9.8 - 20.1 mg/dL    Creatinine 0.65 0.55 - 1.02 mg/dL    BUN/Creatinine Ratio 45     Calcium 8.1 (L) 8.4 - 10.2 mg/dL    Anion Gap 7.0 mEq/L    eGFR >60 mL/min/1.73/m2   CBC with Differential    Collection Time: 06/14/25  3:44 AM   Result Value Ref Range    WBC 10.04 4.50 - 11.50 x10(3)/mcL    RBC 4.82 4.20 -  5.40 x10(6)/mcL    Hgb 13.2 12.0 - 16.0 g/dL    Hct 40.4 37.0 - 47.0 %    MCV 83.8 80.0 - 94.0 fL    MCH 27.4 27.0 - 31.0 pg    MCHC 32.7 (L) 33.0 - 36.0 g/dL    RDW 13.9 11.5 - 17.0 %    Platelet 305 130 - 400 x10(3)/mcL    MPV 10.1 7.4 - 10.4 fL    Neut % 85.1 %    Lymph % 6.2 %    Mono % 8.0 %    Eos % 0.0 %    Basophil % 0.1 %    Imm Grans % 0.6 %    Neut # 8.55 2.1 - 9.2 x10(3)/mcL    Lymph # 0.62 0.6 - 4.6 x10(3)/mcL    Mono # 0.80 0.1 - 1.3 x10(3)/mcL    Eos # 0.00 0 - 0.9 x10(3)/mcL    Baso # 0.01 <=0.2 x10(3)/mcL    Imm Gran # 0.06 (H) 0.00 - 0.04 x10(3)/mcL    NRBC% 0.0 %       Imaging:  CV Ultrasound Bilateral Doppler Carotid  Result Date: 6/11/2025  The right internal carotid artery is patent with less than 50% stenosis. The left internal carotid artery is patent with less than 50% stenosis. Bilateral vertebral arteries are patent with antegrade flow.     XR Gastric tube check, non-radiologist performed  Result Date: 6/10/2025  EXAMINATION: XR GASTRIC TUBE CHECK, NON-RADIOLOGIST PERFORMED CLINICAL HISTORY: placement; TECHNIQUE: Single view of the abdomen COMPARISON: None FINDINGS: EG tube projects over the left upper quadrant expected location of the stomach     As above. Electronically signed by: Dustin Peña Date:    06/10/2025 Time:    16:33    Fl Modified Barium Swallow Speech  Result Date: 6/10/2025  See procedure notes from Speech Pathologist. This procedure was auto-finalized.    CT Head Without Contrast  Result Date: 6/10/2025  EXAMINATION: CT HEAD WITHOUT CONTRAST CLINICAL HISTORY: fu p fossa hemorrhage; TECHNIQUE: Axial scans were obtained from skull base to the vertex. Coronal and sagittal reconstructions obtained from the axial data. Automatic exposure control was utilized to limit radiation dose. Contrast: None Radiation Dose: Total DLP: 830 mGy*cm COMPARISON: CT head dated 06/09/2025 FINDINGS: There is similar appearance of parenchymal hemorrhage in the left middle cerebellar peduncle  measuring 1.8 x 2.6 cm in size.  Residual left CP angle meningioma is better visualized on comparison MRI.  There is encephalomalacia in the left temporal lobe.  Patchy hypodensities in the subcortical periventricular white matter likely represent chronic microvascular ischemic changes.  There is stable mass effect with partial effacement of the 4th ventricle.  The lateral ventricles remain stable in size.  The skull base is intact.     Stable exam without significant interval change. Electronically signed by: Maddie Benavides Date:    06/10/2025 Time:    10:37    MRI Brain W WO Contrast  Result Date: 6/9/2025  EXAMINATION: MRI BRAIN W WO CONTRAST CLINICAL HISTORY: Meningioma; TECHNIQUE: Multiplanar multisequence MR imaging of the brain was performed without and with contrast. COMPARISON: 07/18/2024 MRI FINDINGS: There is some mild cerebral atrophy seen consistent with patient's age.  There is 80 residual area of meningioma seen at the cerebellopontine angle on the left side that measures 2.1 x 0.9 cm.  It is slightly larger than the prior examination.  There are postsurgical changes seen in the left temporal fossa and left cerebellopontine angle from previous meningioma resection.  The meningioma extends anteriorly to the jose and abuts the basilar artery on the left side.  There is another meningioma seen on the right side of the centrum semiovale measured on image 152 series 10.  It measures 1.1 x 0.8 cm.  It is partially calcified. There is old area of focal hemorrhage or calcification in the posterior frontal region at the cerebral convexity on the right side image 10 series 4.  This was seen on prior examination as well. There is some cerebral atrophy and some periventricular white matter change consistent with patient's age.     Residual meningioma at the cerebellopontine angle appears larger than the prior examination and there is some increased surrounding edema. Postsurgical changes in the left temporal  fossa and left posterior fossa stable meningioma the centrum semiovale on the right side Old area of focal hemorrhage or calcification in the posterior cerebral convexity on the right side relatively stable since the prior examination Electronically signed by: Hernando Najera Date:    06/09/2025 Time:    14:12    CTA Head and Neck (xpd)  Result Date: 6/9/2025  EXAMINATION: CTA HEAD AND NECK (XPD) CLINICAL HISTORY: Vertigo, central; TECHNIQUE: CT angiogram was performed from the level of the minor to the top of the head following the IV administration of contrast. 100 mL Isovue 370.  Sagittal and coronal reconstructions and maximum intensity projection reconstructions were performed. Arterial stenosis percentages are based on NASCET measurement criteria.  Automated exposure control, dose radiation lowering technique was utilized. COMPARISON: No prior CTA FINDINGS: CTA NECK: Common origin of the brachiocephalic trunk and left common carotid artery.  Left vertebral artery arises from the aortic arch, normal anatomic variants. Patent right common carotid artery.  Small amount of atherosclerotic plaque at the carotid bulb without flow-limiting stenosis.  Patent external carotid artery.  Continuing cervical ICA is widely patent. Patent left common carotid artery with small amount of calcified atherosclerotic plaque at the distal common carotid artery.  Patent carotid bulb with no flow-limiting stenosis.  Continuing cervical ICA is widely patent.  Patent external carotid artery. Dominant right vertebral artery.  No flow-limiting stenosis at throughout the courses of either vertebral artery. CTA HEAD: No flow-limiting stenosis within the intracranial arterial vasculature.  There is mild calcified atherosclerotic plaque along the carotid siphons bilaterally. Patent dural venous sinuses. Other: Pleuroparenchymal scarring is present at the lung apices.  Degenerative disc changes are present at the cervical spine.     1. No  flow-limiting stenosis or large vessel occlusion within the intracranial arterial vasculature. 2. No flow-limiting stenosis within the cervical arterial vasculature. 3. Patent dural venous sinuses. Electronically signed by: Bernardo Roy MD Date:    06/09/2025 Time:    12:19    CT Head Without Contrast  Result Date: 6/9/2025  EXAMINATION: CT HEAD WITHOUT CONTRAST INDICATION: Neuro deficit, acute, stroke suspected;Facial Droop (Difficulty speaking and walking starting yesterday evening at unknown time. L sided facial droop baseline s/t brain tumor but more severe since yesterday as well. Headache since Thursday.  VAN negative. ) TECHNIQUE: Contiguous axial images are acquired through the head without IV contrast.  These images were reconstructed into the coronal and sagittal plane.  Automated exposure control, dose radiation lowering technique was utilized. COMPARISON: Brain MRI from 07/18/2024 FINDINGS: Patient status post left frontotemporal craniotomy.  Clear mastoid air cells.  Left temporal encephalomalacia is present deep to the postsurgical changes.  Findings are similar compared to 07/18/2024 when allowing for differences in technique. There is a geographic area of decreased density at the right frontal lobe with loss of gray-white differentiation.  No extra-axial fluid collection, contusion or hemorrhage.  No midline shift.  Patent basilar cisterns.  No hyperdense vessel sign. Partially calcified right frontal convexity, presumed meningioma measures 1.2 x 0.8 cm, similar in size since 07/18/2024. Left CP angle mass with erosion at the petrous apex measures approximately 3.7 by 2.5 cm, larger since 07/18/2024, previously measuring 1.5 x 0.6 cm.     1. Loss of gray-white differentiation at the right frontal lobe (best seen image 30, series 2).  Findings suspicious for acute infarct.  No evidence of hemorrhagic transformation. 2. No evidence of intraparenchymal hemorrhage. 3. Findings most consistent  with interval enlargement of left CP angle mass since 07/18/2024.  Findings on MRI from 07/19/2024 suggested underlying meningioma. Electronically signed by: Bernardo Roy MD Date:    06/09/2025 Time:    11:52         Assessment/Plan:  64-year-old white female known to our group with a past medical history of hypertension, benign meningioma s/p resection, hypertension, C5-7 posterior foraminotomies. Presented to the ER 6/9 with facial droop and difficulty speaking and walking.  Patient has left-sided facial droop 2/2 brain tumor but was more pronounced upon arrival.  Upon arrival, VSS other than hypertension.  CT head shows loss of gray-white differentiation of the right frontal lobe.  Suspicious for acute infarct.     GI consulted for PEG placement  1. Need for alternative means of nutrition  2. Oropharyngeal dysphagia   Not cleared by speech      6-13-25 - PEG placed  Site CDI  Binder in place - asked nursing for a smaller one    No further GI recs - please call if needed       Marinane Florez NP as scribe for Dr. Dustin Priest

## 2025-06-15 LAB
BASOPHILS # BLD AUTO: 0.01 X10(3)/MCL
BASOPHILS NFR BLD AUTO: 0.1 %
EOSINOPHIL # BLD AUTO: 0.01 X10(3)/MCL (ref 0–0.9)
EOSINOPHIL NFR BLD AUTO: 0.1 %
ERYTHROCYTE [DISTWIDTH] IN BLOOD BY AUTOMATED COUNT: 13.5 % (ref 11.5–17)
HCT VFR BLD AUTO: 43.6 % (ref 37–47)
HGB BLD-MCNC: 14 G/DL (ref 12–16)
IMM GRANULOCYTES # BLD AUTO: 0.11 X10(3)/MCL (ref 0–0.04)
IMM GRANULOCYTES NFR BLD AUTO: 1.3 %
LYMPHOCYTES # BLD AUTO: 0.73 X10(3)/MCL (ref 0.6–4.6)
LYMPHOCYTES NFR BLD AUTO: 8.6 %
MCH RBC QN AUTO: 27.2 PG (ref 27–31)
MCHC RBC AUTO-ENTMCNC: 32.1 G/DL (ref 33–36)
MCV RBC AUTO: 84.8 FL (ref 80–94)
MONOCYTES # BLD AUTO: 0.83 X10(3)/MCL (ref 0.1–1.3)
MONOCYTES NFR BLD AUTO: 9.8 %
NEUTROPHILS # BLD AUTO: 6.77 X10(3)/MCL (ref 2.1–9.2)
NEUTROPHILS NFR BLD AUTO: 80.1 %
NRBC BLD AUTO-RTO: 0 %
PLATELET # BLD AUTO: 308 X10(3)/MCL (ref 130–400)
PMV BLD AUTO: 10 FL (ref 7.4–10.4)
RBC # BLD AUTO: 5.14 X10(6)/MCL (ref 4.2–5.4)
WBC # BLD AUTO: 8.46 X10(3)/MCL (ref 4.5–11.5)

## 2025-06-15 PROCEDURE — 97116 GAIT TRAINING THERAPY: CPT | Mod: CQ

## 2025-06-15 PROCEDURE — 63600175 PHARM REV CODE 636 W HCPCS

## 2025-06-15 PROCEDURE — 85025 COMPLETE CBC W/AUTO DIFF WBC: CPT

## 2025-06-15 PROCEDURE — 25000003 PHARM REV CODE 250

## 2025-06-15 PROCEDURE — 36415 COLL VENOUS BLD VENIPUNCTURE: CPT

## 2025-06-15 PROCEDURE — 21400001 HC TELEMETRY ROOM

## 2025-06-15 RX ADMIN — HYDRALAZINE HYDROCHLORIDE 25 MG: 25 TABLET ORAL at 08:06

## 2025-06-15 RX ADMIN — ERYTHROMYCIN: 5 OINTMENT OPHTHALMIC at 08:06

## 2025-06-15 RX ADMIN — CARVEDILOL 12.5 MG: 12.5 TABLET, FILM COATED ORAL at 08:06

## 2025-06-15 RX ADMIN — HYDRALAZINE HYDROCHLORIDE 25 MG: 25 TABLET ORAL at 05:06

## 2025-06-15 RX ADMIN — POLYETHYLENE GLYCOL 3350 17 G: 17 POWDER, FOR SOLUTION ORAL at 08:06

## 2025-06-15 RX ADMIN — GABAPENTIN 300 MG: 300 CAPSULE ORAL at 08:06

## 2025-06-15 RX ADMIN — ERYTHROMYCIN: 5 OINTMENT OPHTHALMIC at 01:06

## 2025-06-15 RX ADMIN — ERYTHROMYCIN: 5 OINTMENT OPHTHALMIC at 06:06

## 2025-06-15 RX ADMIN — DEXAMETHASONE SODIUM PHOSPHATE 2 MG: 4 INJECTION, SOLUTION INTRA-ARTICULAR; INTRALESIONAL; INTRAMUSCULAR; INTRAVENOUS; SOFT TISSUE at 08:06

## 2025-06-15 RX ADMIN — ATORVASTATIN CALCIUM 40 MG: 40 TABLET, FILM COATED ORAL at 08:06

## 2025-06-15 RX ADMIN — GABAPENTIN 300 MG: 300 CAPSULE ORAL at 01:06

## 2025-06-15 RX ADMIN — LOSARTAN POTASSIUM 100 MG: 50 TABLET, FILM COATED ORAL at 08:06

## 2025-06-15 RX ADMIN — HYDRALAZINE HYDROCHLORIDE 25 MG: 25 TABLET ORAL at 01:06

## 2025-06-15 RX ADMIN — PANTOPRAZOLE SODIUM 40 MG: 40 INJECTION, POWDER, FOR SOLUTION INTRAVENOUS at 08:06

## 2025-06-15 NOTE — PLAN OF CARE
Problem: Adult Inpatient Plan of Care  Goal: Optimal Comfort and Wellbeing  Outcome: Progressing     Problem: Stroke, Ischemic (Includes Transient Ischemic Attack)  Goal: Optimal Coping  Outcome: Progressing  Intervention: Support Psychosocial Response to Stroke  Flowsheets (Taken 6/15/2025 1513)  Supportive Measures: active listening utilized  Family/Support System Care: self-care encouraged  Goal: Optimal Functional Ability  Outcome: Progressing  Intervention: Optimize Functional Ability  Flowsheets (Taken 6/15/2025 1513)  Activity Management: Up in chair - L3

## 2025-06-15 NOTE — PT/OT/SLP PROGRESS
Physical Therapy Treatment    Patient Name:  Kaity Cuevas   MRN:  [id number]    Recommendations:     Discharge therapy intensity: High Intensity Therapy   Discharge Equipment Recommendations: to be determined by next level of care  Barriers to discharge: Impaired mobility, Ongoing medical needs, and PLACEMENT    Assessment:     Kaity Cuevas is a 64 y.o. female admitted with a medical diagnosis of  acute left sided weakness, residual meningoma-- increase in cerebellopontine angle mass . .  She presents with the following impairments/functional limitations: weakness, gait instability, impaired endurance, impaired balance, impaired self care skills, impaired functional mobility, decreased lower extremity function .    Rehab Prognosis: Good; patient would benefit from acute skilled PT services to address these deficits and reach maximum level of function.    Recent Surgery: Procedure(s) (LRB):  PEG (N/A) 2 Days Post-Op    Plan:     During this hospitalization, patient would benefit from acute PT services 6 x/week to address the identified rehab impairments via gait training, therapeutic activities, therapeutic exercises, neuromuscular re-education and progress toward the following goals:    Plan of Care Expires:  07/10/25    Subjective     Chief Complaint: None  Patient/Family Comments/goals:   Pain/Comfort:         Objective:     Communicated with RN prior to session.  Patient found up in chair with NG tube, peripheral IV, PureWick upon PT entry to room.     General Precautions: Standard, fall (SBP<160)  Orthopedic Precautions: N/A  Braces: N/A  Respiratory Status: Room air  Blood Pressure: 132/77  Skin Integrity: Visible skin intact      Functional Mobility:  Transfers:     Sit to Stand:  minimum assistance with rolling walker  Gait: Pt amb 50ft with RW min/modA. Post lean and NBOS noted. Requires Vcs for step sequence. Needs assist with navigating AD    Co-Treatment: No    Education:  Patient and spouse  were provided with verbal education education regarding PT role/goals/POC.  Understanding was verbalized, however additional teaching warranted.     Patient left up in chair with all lines intact, call button in reach, and  present    GOALS:   Multidisciplinary Problems       Physical Therapy Goals          Problem: Physical Therapy    Goal Priority Disciplines Outcome Interventions   Physical Therapy Goal     PT, PT/OT Progressing    Description: Goals to be met by: 7/10/2025     Patient will increase functional independence with mobility by performin. Supine to sit with Rapides  2. Sit to supine with Rapides  3. Sit to stand transfer with Rapides  4. Gait  x 100 feet with Rapides using No Assistive Device.                          Time Tracking:     PT Received On: 06/15/25  PT Start Time: 1116     PT Stop Time: 1135  PT Total Time (min): 19 min     Billable Minutes: Gait Training 19    Treatment Type: Treatment  PT/PTA: PTA     Number of PTA visits since last PT visit: 4     06/15/2025

## 2025-06-15 NOTE — PROGRESS NOTES
[facility]  Hospital Medicine Progress Note        Chief Complaint: Inpatient Follow-up for     HPI:   64 year old with a history that includes meningioma, s/p resection 2020 and radiation x 2 (2009 and 2021), presented to ED 6/9 with worsening left sided weakness.  CT head non-specific findings in the right frontal lobe suspicious for acute infarct; in addition noting interval enlargement of left CP meningioma since 07/18/2024.   Patient was admitted to  services; Neurology and Neurosurgery.  Subsequent MRI ruled out acute CVA, noting residual meningioma at the cerebellopontine angle, larger than the prior examination and with associated surrounding edema. Patient started on IV decadron.  Neurosurgery on board.  Neurology signed off.      Patient failed swallow. Will consult GI for PEG tube. Continue steroid taper as per neurosurgery. S/p PEG tube on 6/13.    Interval Hx:   NAEO. Seen and examined. Doing much better. Family at bedside. All questions answered. Tolerating tube feeds.     Case was discussed with patient's nurse and  on the floor.    Objective/physical exam:  General: In no acute distress, afebrile. NG tube in placed  Chest: Clear to auscultation bilaterally  Heart: RRR, +S1, S2, no appreciable murmur  Abdomen: Soft, nontender, BS +  MSK: Warm, no lower extremity edema, no clubbing or cyanosis  Neurologic: Alert and oriented x4, Cranial nerve II-XII intact, Strength 5/5 in all 4 extremities    VITAL SIGNS: 24 HRS MIN & MAX LAST   Temp  Min: 97.3 °F (36.3 °C)  Max: 97.5 °F (36.4 °C) 97.4 °F (36.3 °C)   BP  Min: 126/70  Max: 177/81 133/74   Pulse  Min: 74  Max: 87  87   Resp  Min: 15  Max: 16 15   SpO2  Min: 97 %  Max: 99 % 98 %     I have reviewed the following labs:  Recent Labs   Lab 06/13/25  0554 06/14/25  0344 06/15/25  1056   WBC 12.02* 10.04 8.46   RBC 5.02 4.82 5.14   HGB 13.7 13.2 14.0   HCT 41.9 40.4 43.6   MCV 83.5 83.8 84.8   MCH 27.3 27.4 27.2    MCHC 32.7* 32.7* 32.1*   RDW 13.8 13.9 13.5    305 308   MPV 10.0 10.1 10.0     Recent Labs   Lab 06/09/25  1033 06/10/25  0508 06/11/25  0345 06/13/25  0554 06/14/25  0344    140 140 141 141   K 3.3* 3.5 3.6 4.0 4.3    102 104 104 107   CO2 25 25 25 29 27   BUN 15.3 21.2* 35.8* 29.1* 29.4*   CREATININE 0.90 0.77 0.85 0.71 0.65    141* 137* 129* 133*   CALCIUM 10.4* 10.0 9.8 8.9 8.1*   MG  --  1.90  --   --   --    ALBUMIN 4.2 3.7 3.7  --   --    PROT 8.4* 7.6 7.5  --   --    ALKPHOS 85 78 75  --   --    ALT 22 20 16  --   --    AST 26 21 17  --   --    BILITOT 0.7 0.4 0.5  --   --      Microbiology Results (last 7 days)       ** No results found for the last 168 hours. **             See below for Radiology    Assessment/Plan:  # Residual meningioma at the cerebellopontine angle appears larger than the prior examination and there is some increased surrounding edema.  # Old area of focal hemorrhage or calcification in the posterior cerebral convexity on the right side  # Hx of meningioma, s/p resection 2020 and radiation x 2 (2009 and 2021)  # HTN  # Dysphagia 2/2 meningioma  # Steroid induced leukocytosis    - neurology and neurosurgery recommendations appreciated   - plan to repeat scans in 6 weeks to determine if additional radiation is needed   - Continue decadron 4mg Q6H x 2 days, 2mg Q8H x 2 days, 2mg BID x 2 days, then continue 2mg daily till seen in clinic - switched to 2mg q8hr 6/14  - consult GI for PEG tube. I spoke with Dr. Lagos; he thinks low probability of obtaining swallowing function with the steroids   - s/p PEG tube on 6/13   - started on tube feeds on 6/14  - IV PPI while on steroids  - continue losartan 100 mg daily. Continue hydralazine 25 mg q8hr. Continue home diltiazem and increase coreg 12.5mg BID  - PT/OT/SLP  - aspiration precautions  - obtain labs in AM    VTE prophylaxis: SCD    Patient condition:  Guarded    Anticipated discharge and Disposition:          All diagnosis and differential diagnosis have been reviewed; assessment and plan has been documented; I have personally reviewed the labs and test results that are presently available; I have reviewed the patients medication list; I have reviewed the consulting providers response and recommendations. I have reviewed or attempted to review medical records based upon their availability    All of the patient's questions have been  addressed and answered. Patient's is agreeable to the above stated plan. I will continue to monitor closely and make adjustments to medical management as needed.    _____________________________________________________________________    Malnutrition Status:  Nutrition consulted. Most recent weight and BMI monitored-     Measurements:  Wt Readings from Last 1 Encounters:   06/09/25 45.3 kg (99 lb 13.9 oz)   Body mass index is 18.27 kg/m².    Patient has been screened and assessed by RD.    Malnutrition Type:  Context: acute illness or injury  Level: severe    Malnutrition Characteristic Summary:  Weight Loss (Malnutrition): other (see comments) (Unable to assess)  Energy Intake (Malnutrition): less than or equal to 50% for greater than or equal to 5 days  Subcutaneous Fat (Malnutrition): mild depletion  Muscle Mass (Malnutrition): moderate depletion  Fluid Accumulation (Malnutrition): other (see comments) (Not present)    Interventions/Recommendations (treatment strategy):  Enteral nutrition;Oral nutritional supplement;Feeding assistance/management;Oral diet/nutrient modifications     Scheduled Med:   atorvastatin  40 mg Per G Tube Daily    carvediloL  12.5 mg Per G Tube BID    dexAMETHasone injection  2 mg Intravenous Q12H    diltiaZEM  180 mg Oral Daily    erythromycin   Left Eye QID    gabapentin  300 mg Per G Tube TID    hydrALAZINE  25 mg Per G Tube Q8H    losartan  100 mg Per G Tube Daily    pantoprazole  40 mg Intravenous Daily    polyethylene glycol  17 g Per G Tube Daily       Continuous Infusions:     PRN Meds:    Current Facility-Administered Medications:     bisacodyL, 10 mg, Rectal, Daily PRN    hydrALAZINE, 10 mg, Intravenous, Q4H PRN    labetalol, 20 mg, Intravenous, Q4H PRN    sodium chloride 0.9%, 10 mL, Intravenous, PRN     Radiology:  I have personally reviewed the following imaging and agree with the radiologist.     CV Ultrasound Bilateral Doppler Carotid  The right internal carotid artery is patent with less than 50% stenosis.  The left internal carotid artery is patent with less than 50% stenosis.  Bilateral vertebral arteries are patent with antegrade flow.       Ian Elise MD  Department of Hospital Medicine   [facility]   06/15/2025

## 2025-06-16 LAB
ANION GAP SERPL CALC-SCNC: 6 MEQ/L
BASOPHILS # BLD AUTO: 0.01 X10(3)/MCL
BASOPHILS NFR BLD AUTO: 0.1 %
BUN SERPL-MCNC: 32.7 MG/DL (ref 9.8–20.1)
CALCIUM SERPL-MCNC: 9 MG/DL (ref 8.4–10.2)
CHLORIDE SERPL-SCNC: 104 MMOL/L (ref 98–107)
CO2 SERPL-SCNC: 29 MMOL/L (ref 23–31)
CREAT SERPL-MCNC: 0.6 MG/DL (ref 0.55–1.02)
CREAT/UREA NIT SERPL: 55
EOSINOPHIL # BLD AUTO: 0.04 X10(3)/MCL (ref 0–0.9)
EOSINOPHIL NFR BLD AUTO: 0.4 %
ERYTHROCYTE [DISTWIDTH] IN BLOOD BY AUTOMATED COUNT: 13.4 % (ref 11.5–17)
GFR SERPLBLD CREATININE-BSD FMLA CKD-EPI: >60 ML/MIN/1.73/M2
GLUCOSE SERPL-MCNC: 138 MG/DL (ref 82–115)
HCT VFR BLD AUTO: 40.9 % (ref 37–47)
HGB BLD-MCNC: 13.1 G/DL (ref 12–16)
IMM GRANULOCYTES # BLD AUTO: 0.1 X10(3)/MCL (ref 0–0.04)
IMM GRANULOCYTES NFR BLD AUTO: 1.1 %
LYMPHOCYTES # BLD AUTO: 1.69 X10(3)/MCL (ref 0.6–4.6)
LYMPHOCYTES NFR BLD AUTO: 18.6 %
MAGNESIUM SERPL-MCNC: 2.1 MG/DL (ref 1.6–2.6)
MCH RBC QN AUTO: 27.1 PG (ref 27–31)
MCHC RBC AUTO-ENTMCNC: 32 G/DL (ref 33–36)
MCV RBC AUTO: 84.5 FL (ref 80–94)
MONOCYTES # BLD AUTO: 1.03 X10(3)/MCL (ref 0.1–1.3)
MONOCYTES NFR BLD AUTO: 11.3 %
NEUTROPHILS # BLD AUTO: 6.24 X10(3)/MCL (ref 2.1–9.2)
NEUTROPHILS NFR BLD AUTO: 68.5 %
NRBC BLD AUTO-RTO: 0 %
PLATELET # BLD AUTO: 281 X10(3)/MCL (ref 130–400)
PMV BLD AUTO: 10.2 FL (ref 7.4–10.4)
POTASSIUM SERPL-SCNC: 3.9 MMOL/L (ref 3.5–5.1)
RBC # BLD AUTO: 4.84 X10(6)/MCL (ref 4.2–5.4)
SODIUM SERPL-SCNC: 139 MMOL/L (ref 136–145)
WBC # BLD AUTO: 9.11 X10(3)/MCL (ref 4.5–11.5)

## 2025-06-16 PROCEDURE — 36415 COLL VENOUS BLD VENIPUNCTURE: CPT

## 2025-06-16 PROCEDURE — 85025 COMPLETE CBC W/AUTO DIFF WBC: CPT

## 2025-06-16 PROCEDURE — 25500020 PHARM REV CODE 255

## 2025-06-16 PROCEDURE — 25000003 PHARM REV CODE 250

## 2025-06-16 PROCEDURE — 63600175 PHARM REV CODE 636 W HCPCS

## 2025-06-16 PROCEDURE — 97535 SELF CARE MNGMENT TRAINING: CPT | Mod: CO

## 2025-06-16 PROCEDURE — 83735 ASSAY OF MAGNESIUM: CPT

## 2025-06-16 PROCEDURE — 80048 BASIC METABOLIC PNL TOTAL CA: CPT

## 2025-06-16 PROCEDURE — 25000003 PHARM REV CODE 250: Performed by: INTERNAL MEDICINE

## 2025-06-16 PROCEDURE — 97116 GAIT TRAINING THERAPY: CPT | Mod: CQ

## 2025-06-16 PROCEDURE — 21400001 HC TELEMETRY ROOM

## 2025-06-16 RX ORDER — DEXAMETHASONE SODIUM PHOSPHATE 4 MG/ML
2 INJECTION, SOLUTION INTRA-ARTICULAR; INTRALESIONAL; INTRAMUSCULAR; INTRAVENOUS; SOFT TISSUE EVERY 12 HOURS
Status: DISCONTINUED | OUTPATIENT
Start: 2025-06-16 | End: 2025-06-17

## 2025-06-16 RX ORDER — BISACODYL 10 MG/1
10 SUPPOSITORY RECTAL ONCE
Status: COMPLETED | OUTPATIENT
Start: 2025-06-16 | End: 2025-06-16

## 2025-06-16 RX ADMIN — POLYETHYLENE GLYCOL 3350 17 G: 17 POWDER, FOR SOLUTION ORAL at 08:06

## 2025-06-16 RX ADMIN — ERYTHROMYCIN: 5 OINTMENT OPHTHALMIC at 04:06

## 2025-06-16 RX ADMIN — DEXAMETHASONE SODIUM PHOSPHATE 2 MG: 4 INJECTION, SOLUTION INTRA-ARTICULAR; INTRALESIONAL; INTRAMUSCULAR; INTRAVENOUS; SOFT TISSUE at 09:06

## 2025-06-16 RX ADMIN — PANTOPRAZOLE SODIUM 40 MG: 40 INJECTION, POWDER, FOR SOLUTION INTRAVENOUS at 08:06

## 2025-06-16 RX ADMIN — ERYTHROMYCIN: 5 OINTMENT OPHTHALMIC at 01:06

## 2025-06-16 RX ADMIN — ERYTHROMYCIN: 5 OINTMENT OPHTHALMIC at 08:06

## 2025-06-16 RX ADMIN — HYDRALAZINE HYDROCHLORIDE 25 MG: 25 TABLET ORAL at 09:06

## 2025-06-16 RX ADMIN — GABAPENTIN 300 MG: 300 CAPSULE ORAL at 02:06

## 2025-06-16 RX ADMIN — HYDRALAZINE HYDROCHLORIDE 25 MG: 25 TABLET ORAL at 05:06

## 2025-06-16 RX ADMIN — CARVEDILOL 12.5 MG: 12.5 TABLET, FILM COATED ORAL at 09:06

## 2025-06-16 RX ADMIN — BISACODYL 10 MG: 10 SUPPOSITORY RECTAL at 04:06

## 2025-06-16 RX ADMIN — IOHEXOL 75 ML: 350 INJECTION, SOLUTION INTRAVENOUS at 12:06

## 2025-06-16 RX ADMIN — ATORVASTATIN CALCIUM 40 MG: 40 TABLET, FILM COATED ORAL at 08:06

## 2025-06-16 RX ADMIN — GABAPENTIN 300 MG: 300 CAPSULE ORAL at 08:06

## 2025-06-16 RX ADMIN — GABAPENTIN 300 MG: 300 CAPSULE ORAL at 09:06

## 2025-06-16 RX ADMIN — CARVEDILOL 12.5 MG: 12.5 TABLET, FILM COATED ORAL at 08:06

## 2025-06-16 RX ADMIN — HYDRALAZINE HYDROCHLORIDE 25 MG: 25 TABLET ORAL at 02:06

## 2025-06-16 RX ADMIN — LACTULOSE 10 G: 10 SOLUTION ORAL at 06:06

## 2025-06-16 RX ADMIN — DEXAMETHASONE SODIUM PHOSPHATE 2 MG: 4 INJECTION, SOLUTION INTRA-ARTICULAR; INTRALESIONAL; INTRAMUSCULAR; INTRAVENOUS; SOFT TISSUE at 11:06

## 2025-06-16 RX ADMIN — DILTIAZEM HYDROCHLORIDE 180 MG: 180 CAPSULE, COATED, EXTENDED RELEASE ORAL at 08:06

## 2025-06-16 RX ADMIN — ERYTHROMYCIN: 5 OINTMENT OPHTHALMIC at 09:06

## 2025-06-16 RX ADMIN — LOSARTAN POTASSIUM 100 MG: 50 TABLET, FILM COATED ORAL at 08:06

## 2025-06-16 RX ADMIN — DEXAMETHASONE SODIUM PHOSPHATE 2 MG: 4 INJECTION, SOLUTION INTRA-ARTICULAR; INTRALESIONAL; INTRAMUSCULAR; INTRAVENOUS; SOFT TISSUE at 08:06

## 2025-06-16 NOTE — PT/OT/SLP PROGRESS
Occupational Therapy   Treatment    Name: Kaity Cuevas  MRN: [id number]    Recommendations:     Recommended therapy intensity at discharge: High Intensity Therapy   Discharge Equipment Recommendations:  to be determined by next level of care  Barriers to discharge:       Assessment:     Kaity Cuevas is a 64 y.o. 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 .  She presents with increased L UE weakness, L inattention, and a posterior lean during mobility, pt. Is a fall risk at this time recommending High intensity therapy pending progress.. Performance deficits affecting function are weakness, impaired endurance, impaired self care skills, impaired functional mobility, gait instability, impaired balance, decreased safety awareness, decreased lower extremity function, decreased upper extremity function, impaired coordination.     Rehab Prognosis:  Good; patient would benefit from acute skilled OT services to address these deficits and reach maximum level of function.       Plan:     Patient to be seen 6 x/week to address the above listed problems via self-care/home management, therapeutic activities, therapeutic exercises, neuromuscular re-education  Plan of Care Expires: 07/08/25  Plan of Care Reviewed with: patient    Subjective     Pain/Comfort:       Objective:     Communicated with: RN prior to session.  Patient found HOB elevated with   upon OT entry to room.    General Precautions: Standard, aspiration    Orthopedic Precautions:N/A  Braces: N/A  Respiratory Status: Room air  Vital Signs: Blood Pressure: 134/72     Occupational Performance:   (Bed Mobility- Min A)  (Sitting balance- SBA) for safety  (Sit to stand- Min A)  Pt. Performing stand step t/f from EOB to BS chair using RW for UE support Min A.  L Posterior lean noted.   BSC- Min A for safe descend. Pt. Then t/f to BS chair, left UIC with all needs within reach.      Therapeutic Positioning    OT interventions performed during the course of today's session in an effort to prevent and/or reduce acquired pressure injuries:   Therapeutic positioning was provided at the conclusion of session to offload all bony prominences for the prevention and/or reduction of pressure injuries      Patient Education:  Patient provided with verbal education education regarding fall prevention, safety awareness, and pressure ulcer prevention.  Additional teaching is warranted.      Patient left up in chair with all lines intact and call button in reach.    GOALS:   Multidisciplinary Problems       Occupational Therapy Goals          Problem: Occupational Therapy    Goal Priority Disciplines Outcome Interventions   Occupational Therapy Goal     OT, PT/OT Progressing    Description: Goals to be met by: 7/8/25     Patient will increase functional independence with ADLs by performing:    UE Dressing with Modified [place].  LE Dressing with Modified [place].  Grooming while standing with Modified [place].  Toileting from toilet with Modified [place] for hygiene and clothing management.   Toilet transfer to toilet with Modified [place]. Progress from commode as appropriate.                          Time Tracking:     OT Date of Treatment: 06/16/25  OT Start Time: 0923  OT Stop Time: 0941  OT Total Time (min): 18 min    Billable Minutes:Self Care/Home Management 1    Supervising Occupational Therapist: LUKE Enriquez  OT/ELIZABETH: ELIZABETH     Number of ELIZABETH visits since last OT visit: 3    6/16/2025

## 2025-06-16 NOTE — PROGRESS NOTES
[facility]  Hospital Medicine Progress Note        Chief Complaint: Inpatient Follow-up for     HPI:   64 year old with a history that includes meningioma, s/p resection 2020 and radiation x 2 (2009 and 2021), presented to ED 6/9 with worsening left sided weakness.  CT head non-specific findings in the right frontal lobe suspicious for acute infarct; in addition noting interval enlargement of left CP meningioma since 07/18/2024.   Patient was admitted to  services; Neurology and Neurosurgery.  Subsequent MRI ruled out acute CVA, noting residual meningioma at the cerebellopontine angle, larger than the prior examination and with associated surrounding edema. Patient started on IV decadron.  Neurosurgery on board.  Neurology signed off.      Patient failed swallow. Will consult GI for PEG tube. Continue steroid taper as per neurosurgery. S/p PEG tube on 6/13.    Interval Hx:   This morning I feel like her left sided facial droop is worsening and she had some worsening of her slurry speech. Family at bedside. Otherwise motor strength is the same as before.     Case was discussed with patient's nurse and  on the floor.    Objective/physical exam:  General: In no acute distress, afebrile. NG tube in placed  Chest: Clear to auscultation bilaterally  Heart: RRR, +S1, S2, no appreciable murmur  Abdomen: Soft, nontender, BS +  MSK: Warm, no lower extremity edema, no clubbing or cyanosis  Neurologic: Alert and oriented x4, Cranial nerve II-XII intact, Strength 5/5 in all 4 extremities    VITAL SIGNS: 24 HRS MIN & MAX LAST   Temp  Min: 97.5 °F (36.4 °C)  Max: 98.2 °F (36.8 °C) 97.5 °F (36.4 °C)   BP  Min: 120/67  Max: 157/79 (!) 142/76   Pulse  Min: 85  Max: 100  88   Resp  Min: 14  Max: 16 14   SpO2  Min: 97 %  Max: 98 % 98 %     I have reviewed the following labs:  Recent Labs   Lab 06/14/25  0344 06/15/25  1056 06/16/25  0830   WBC 10.04 8.46 9.11   RBC 4.82 5.14 4.84   HGB 13.2  14.0 13.1   HCT 40.4 43.6 40.9   MCV 83.8 84.8 84.5   MCH 27.4 27.2 27.1   MCHC 32.7* 32.1* 32.0*   RDW 13.9 13.5 13.4    308 281   MPV 10.1 10.0 10.2     Recent Labs   Lab 06/10/25  0508 06/11/25  0345 06/13/25  0554 06/14/25  0344 06/16/25  0830    140 141 141 139   K 3.5 3.6 4.0 4.3 3.9    104 104 107 104   CO2 25 25 29 27 29   BUN 21.2* 35.8* 29.1* 29.4* 32.7*   CREATININE 0.77 0.85 0.71 0.65 0.60   * 137* 129* 133* 138*   CALCIUM 10.0 9.8 8.9 8.1* 9.0   MG 1.90  --   --   --  2.10   ALBUMIN 3.7 3.7  --   --   --    PROT 7.6 7.5  --   --   --    ALKPHOS 78 75  --   --   --    ALT 20 16  --   --   --    AST 21 17  --   --   --    BILITOT 0.4 0.5  --   --   --      Microbiology Results (last 7 days)       ** No results found for the last 168 hours. **             See below for Radiology    Assessment/Plan:  # Residual meningioma at the cerebellopontine angle appears larger than the prior examination and there is some increased surrounding edema.  # Old area of focal hemorrhage or calcification in the posterior cerebral convexity on the right side  # Hx of meningioma, s/p resection 2020 and radiation x 2 (2009 and 2021)  # HTN  # Dysphagia 2/2 meningioma  # Steroid induced leukocytosis    - neurology and neurosurgery recommendations appreciated   - plan to repeat scans in 6 weeks to determine if additional radiation is needed   - Continue decadron 4mg Q6H x 2 days, 2mg Q8H x 2 days, 2mg BID x 2 days, then continue 2mg daily till seen in clinic - switched to 2mg q12hr 6/16   - I will get a repeat CTH and CTA head and neck   - I will reconsult neurosurgery  - consult GI for PEG tube. I spoke with Dr. Lagos; he thinks low probability of obtaining swallowing function with the steroids   - s/p PEG tube on 6/13   - started on tube feeds on 6/14  - IV PPI while on steroids  - continue losartan 100 mg daily. Continue hydralazine 25 mg q8hr. Continue home diltiazem and increase coreg 12.5mg  BID  - PT/OT/SLP  - aspiration precautions  - obtain labs in AM    VTE prophylaxis: SCD    Patient condition:  Guarded    Anticipated discharge and Disposition:         All diagnosis and differential diagnosis have been reviewed; assessment and plan has been documented; I have personally reviewed the labs and test results that are presently available; I have reviewed the patients medication list; I have reviewed the consulting providers response and recommendations. I have reviewed or attempted to review medical records based upon their availability    All of the patient's questions have been  addressed and answered. Patient's is agreeable to the above stated plan. I will continue to monitor closely and make adjustments to medical management as needed.    _____________________________________________________________________    Malnutrition Status:  Nutrition consulted. Most recent weight and BMI monitored-     Measurements:  Wt Readings from Last 1 Encounters:   06/09/25 45.3 kg (99 lb 13.9 oz)   Body mass index is 18.27 kg/m².    Patient has been screened and assessed by RD.    Malnutrition Type:  Context: acute illness or injury  Level: severe    Malnutrition Characteristic Summary:  Weight Loss (Malnutrition): other (see comments) (Unable to assess)  Energy Intake (Malnutrition): less than or equal to 50% for greater than or equal to 5 days  Subcutaneous Fat (Malnutrition): mild depletion  Muscle Mass (Malnutrition): moderate depletion  Fluid Accumulation (Malnutrition): other (see comments) (Not present)    Interventions/Recommendations (treatment strategy):  Enteral nutrition;Oral nutritional supplement;Feeding assistance/management;Oral diet/nutrient modifications     Scheduled Med:   atorvastatin  40 mg Per G Tube Daily    carvediloL  12.5 mg Per G Tube BID    dexAMETHasone injection  2 mg Intravenous Q12H    diltiaZEM  180 mg Oral Daily    erythromycin   Left Eye QID    gabapentin  300 mg Per G Tube TID     hydrALAZINE  25 mg Per G Tube Q8H    losartan  100 mg Per G Tube Daily    pantoprazole  40 mg Intravenous Daily    polyethylene glycol  17 g Per G Tube Daily      Continuous Infusions:     PRN Meds:    Current Facility-Administered Medications:     bisacodyL, 10 mg, Rectal, Daily PRN    hydrALAZINE, 10 mg, Intravenous, Q4H PRN    labetalol, 20 mg, Intravenous, Q4H PRN    sodium chloride 0.9%, 10 mL, Intravenous, PRN     Radiology:  I have personally reviewed the following imaging and agree with the radiologist.     CTA Head and Neck (xpd)  Narrative: EXAMINATION:  CTA HEAD AND NECK (XPD)    CLINICAL HISTORY:  worsening left sided facial droop;    TECHNIQUE:  Axial images obtained through the cervical region and [place] before and after the administration of intravenous contrast.    Coronal, sagittal, MIP and 3D reconstructions were obtained from the axial data.    Automatic exposure control was utilized to limit radiation dose.    Radiation Dose:    Total DLP: 1494 mGy*cm    COMPARISON:  CT head dated 06/16/2025, CTA head neck dated 06/09/2025, MRI brain dated 06/09/2025    FINDINGS:  Head CT with contrast:    Similar appearance of left middle cerebral peduncle hemorrhage and residual enhancing left CP angle meningioma.    No enhancing abnormalities.    If present, stenosis of the carotid bulbs is measured based on NASCET criteria,    i.e. area of maximal stenosis compared to the cervical ICA distal to the bulb.    Cervical CTA:    The origins of the great vessels are patent with mild scattered calcifications    The common carotids are patent.  There are mild calcifications the carotid bulbs without hemodynamically stenosis.  The internal carotid are patent.    The vertebral arteries are patent.    Intracranial CTA:    There are calcifications in the course the carotid siphons with mild narrowing of the right supraclinoid segment.  The middle cerebral arteries and anterior cerebral is are patent    The  vertebral arteries, basilar artery and posterior cerebral arteries are patent.    The dural venous sinuses are patent.  Impression: No large vessel occlusion or flow-limiting stenosis.  No significant interval change.    Electronically signed by: Maddie Benavides  Date:    06/16/2025  Time:    13:14  CT Head Without Contrast  Narrative: EXAMINATION:  CT HEAD WITHOUT CONTRAST    CLINICAL HISTORY:  Neuro deficit, acute, stroke suspected;Worsening facial droop;    TECHNIQUE:  Multiple axial images were obtained from the base of the brain to the vertex without contrast administration.  Sagittal and coronal reconstructions were performed. .Automatic exposure control  (AEC) is utilized to reduce patient radiation exposure.    COMPARISON:  06/10/2025    FINDINGS:  The patient has undergone previous craniotomy in the left temporal region with encephalomalacia in the left temporal lobe.  This was seen on the prior examination as well.    There is a cerebellopontine angle meningioma seen which was seen on the prior examination from 06/09/2025 as well.  It is better visualized on the MRI.  There is a persistent hemorrhage seen along the cerebral peduncle on the left side which was seen on the prior examination as well.  There is surrounding cytotoxic edema.  The area of hemorrhage is similar to the prior examination.  No new areas of hemorrhage are seen.    Paranasal sinuses shows a mucous retention cyst in the left maxillary sinus.  Impression: Stable exam with stable area of hemorrhage in the left cerebral peduncle and stable left cerebellopontine angle meningioma which is better visualized on the postcontrast MRI    Stable area of encephalomalacia in the left temporal region with craniotomy in the left temporal region    Mucous retention cyst in the left maxillary sinus    Electronically signed by: Hernando Najera  Date:    06/16/2025  Time:    12:58      Ian Elise MD  Department of Hospital Medicine   [facility]   06/16/2025

## 2025-06-16 NOTE — PT/OT/SLP PROGRESS
Physical Therapy Treatment    Patient Name:  Kaity Cuevas   MRN:  [id number]    Recommendations:     Discharge therapy intensity: High Intensity Therapy   Discharge Equipment Recommendations: to be determined by next level of care  Barriers to discharge: Ongoing medical needs    Assessment:     Kaity Cuevas is a 64 y.o. 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  She presents with the following impairments/functional limitations: weakness, gait instability, impaired endurance, impaired balance, impaired self care skills, impaired functional mobility, decreased lower extremity function.    Rehab Prognosis: Good; patient would benefit from acute skilled PT services to address these deficits and reach maximum level of function.    Recent Surgery: Procedure(s) (LRB):  PEG (N/A) 3 Days Post-Op    Plan:     During this hospitalization, patient would benefit from acute PT services 6 x/week to address the identified rehab impairments via gait training and progress toward the following goals:    Plan of Care Expires:  07/10/25    Subjective     Chief Complaint: none  Patient/Family Comments/goals: to get stronger  Pain/Comfort:  Pain Rating 1: 0/10      Objective:     Communicated with pts nurse prior to session.  Patient found up in chair with PEG Tube, peripheral IV, PureWick, telemetry upon PT entry to room.     General Precautions: Standard, aspiration (SBP <160)  Orthopedic Precautions: N/A  Braces: N/A  Respiratory Status: Room air  Blood Pressure: 148/76  Skin Integrity: Visible skin intact      Functional Mobility:  Transfers:     Sit to Stand:  minimum assistance with rolling walker  Bed to Chair: contact guard assistance with  rolling walker  using  Step Transfer  Gait: The pt ambulated short distance in room and 65 ft w/ RW, CG to Min A  Balance: CGA need to assi t w/ balance coming to stand, no LOB during gait.  Pt  treatment stopped due to pt having to leave for imaging.  Pt transferred back to bed requiring CGA sit to supine.     Co-Treatment: No    Education:  Patient and spouse were provided with verbal education and demonstrations education regarding fall prevention and safety awareness.  Understanding was verbalized, however additional teaching warranted.     Patient left HOB elevated with nurse notified and transport present    GOALS:   Multidisciplinary Problems       Physical Therapy Goals          Problem: Physical Therapy    Goal Priority Disciplines Outcome Interventions   Physical Therapy Goal     PT, PT/OT Progressing    Description: Goals to be met by: 7/10/2025     Patient will increase functional independence with mobility by performin. Supine to sit with [place]  2. Sit to supine with [place]  3. Sit to stand transfer with [place]  4. Gait  x 100 feet with [place] using No Assistive Device.                          Time Tracking:     PT Received On: 25  PT Start Time: 1212     PT Stop Time: 1226  PT Total Time (min): 14 min     Billable Minutes: Gait Training 14 min    Treatment Type: Treatment  PT/PTA: PTA     Number of PTA visits since last PT visit: 5     2025

## 2025-06-17 PROCEDURE — 92523 SPEECH SOUND LANG COMPREHEN: CPT

## 2025-06-17 PROCEDURE — 25000003 PHARM REV CODE 250: Performed by: INTERNAL MEDICINE

## 2025-06-17 PROCEDURE — 92526 ORAL FUNCTION THERAPY: CPT

## 2025-06-17 PROCEDURE — 21400001 HC TELEMETRY ROOM

## 2025-06-17 PROCEDURE — 63600175 PHARM REV CODE 636 W HCPCS: Performed by: STUDENT IN AN ORGANIZED HEALTH CARE EDUCATION/TRAINING PROGRAM

## 2025-06-17 PROCEDURE — 97164 PT RE-EVAL EST PLAN CARE: CPT

## 2025-06-17 PROCEDURE — 97530 THERAPEUTIC ACTIVITIES: CPT

## 2025-06-17 PROCEDURE — 25000003 PHARM REV CODE 250

## 2025-06-17 PROCEDURE — 63600175 PHARM REV CODE 636 W HCPCS

## 2025-06-17 RX ORDER — DEXAMETHASONE SODIUM PHOSPHATE 4 MG/ML
4 INJECTION, SOLUTION INTRA-ARTICULAR; INTRALESIONAL; INTRAMUSCULAR; INTRAVENOUS; SOFT TISSUE EVERY 12 HOURS
Status: DISCONTINUED | OUTPATIENT
Start: 2025-06-17 | End: 2025-06-19

## 2025-06-17 RX ORDER — BISACODYL 10 MG/1
10 SUPPOSITORY RECTAL ONCE
Status: COMPLETED | OUTPATIENT
Start: 2025-06-17 | End: 2025-06-17

## 2025-06-17 RX ADMIN — DEXAMETHASONE SODIUM PHOSPHATE 4 MG: 4 INJECTION, SOLUTION INTRA-ARTICULAR; INTRALESIONAL; INTRAMUSCULAR; INTRAVENOUS; SOFT TISSUE at 09:06

## 2025-06-17 RX ADMIN — LACTULOSE 10 G: 10 SOLUTION ORAL at 09:06

## 2025-06-17 RX ADMIN — ERYTHROMYCIN: 5 OINTMENT OPHTHALMIC at 05:06

## 2025-06-17 RX ADMIN — POLYETHYLENE GLYCOL 3350 17 G: 17 POWDER, FOR SOLUTION ORAL at 09:06

## 2025-06-17 RX ADMIN — CARVEDILOL 12.5 MG: 12.5 TABLET, FILM COATED ORAL at 09:06

## 2025-06-17 RX ADMIN — BISACODYL 10 MG: 10 SUPPOSITORY RECTAL at 11:06

## 2025-06-17 RX ADMIN — HYDRALAZINE HYDROCHLORIDE 25 MG: 25 TABLET ORAL at 02:06

## 2025-06-17 RX ADMIN — ERYTHROMYCIN: 5 OINTMENT OPHTHALMIC at 02:06

## 2025-06-17 RX ADMIN — GABAPENTIN 300 MG: 300 CAPSULE ORAL at 09:06

## 2025-06-17 RX ADMIN — LOSARTAN POTASSIUM 100 MG: 50 TABLET, FILM COATED ORAL at 09:06

## 2025-06-17 RX ADMIN — ERYTHROMYCIN: 5 OINTMENT OPHTHALMIC at 09:06

## 2025-06-17 RX ADMIN — DILTIAZEM HYDROCHLORIDE 180 MG: 180 CAPSULE, COATED, EXTENDED RELEASE ORAL at 09:06

## 2025-06-17 RX ADMIN — PANTOPRAZOLE SODIUM 40 MG: 40 INJECTION, POWDER, FOR SOLUTION INTRAVENOUS at 09:06

## 2025-06-17 RX ADMIN — HYDRALAZINE HYDROCHLORIDE 25 MG: 25 TABLET ORAL at 09:06

## 2025-06-17 RX ADMIN — ATORVASTATIN CALCIUM 40 MG: 40 TABLET, FILM COATED ORAL at 09:06

## 2025-06-17 RX ADMIN — GABAPENTIN 300 MG: 300 CAPSULE ORAL at 02:06

## 2025-06-17 RX ADMIN — LACTULOSE 10 G: 10 SOLUTION ORAL at 02:06

## 2025-06-17 NOTE — PROGRESS NOTES
Inpatient Nutrition Assessment    Admit Date: 2025   Total duration of encounter: 8 days   Patient Age: 64 y.o.    Nutrition Recommendation/Prescription     Trial bolus tube feeding (4-5 feeds daily) to allow time away from continuous infusion.   -1st bolus volume of 50mL Isosource HN + 30mL flush before and after. If tolerates, repeat same bolus after 4 hr.   -Advance volume of boluses by 30mL q8-12hr only as tolerated.   (Example advancement of feeds q 4hr as toleratinmL, 50mL, 80mL, 80mL, 110mL, 110mL, 140mL, 140mL, and so forth to goal)    Goal bolus tube feeding recommendations  250mL Isosource HN 4 daily or a total of 1000mL Isosource HN spread throughout the day will provide  1200kcals/d (95% est needs)  54g protein/d (79% est needs)  808mL fl/d (60% est needs)    Medical management of reflux.     Continue daily bowel regimen and ensure adequate bowel movements.     Continue NPO until cleared for oral diet. Texture modifications per SLP.     Communication of Recommendations: reviewed with nurse, reviewed with patient, and reviewed with family    Nutrition Assessment     Malnutrition Assessment/Nutrition-Focused Physical Exam    Malnutrition Context: acute illness or injury (25 1308)  Malnutrition Level: severe (25 1308)  Energy Intake (Malnutrition): less than or equal to 50% for greater than or equal to 5 days (25 1308)  Weight Loss (Malnutrition): other (see comments) (Unable to assess) (25 1308)  Subcutaneous Fat (Malnutrition): mild depletion (25 1308)  Orbital Region (Subcutaneous Fat Loss): mild depletion  Upper Arm Region (Subcutaneous Fat Loss): mild depletion     Muscle Mass (Malnutrition): moderate depletion (25 1308)  [place] Region (Muscle Loss): moderate depletion  Clavicle Bone Region (Muscle Loss): moderate depletion                    Fluid Accumulation (Malnutrition): other (see comments) (Not present) (25 130)        A minimum of two  characteristics is recommended for diagnosis of either severe or non-severe malnutrition.    Chart Review    Reason Seen: malnutrition screening tool (MST) and follow-up    Malnutrition Screening Tool Results   Have you recently lost weight without trying?: Unsure  Have you been eating poorly because of a decreased appetite?: Yes   MST Score: 3   Diagnosis:  Worsening left facial droop, left-sided weakness  History of benign meningioma resected in 2020 followed by Neurosurgery with serial MRI  --with chronic left facial deficits as well as left sided weakness  HLD  Hypertension    Relevant Medical History: Benign meningioma, Carpal tunnel syndrome on right, Cataract, Cervical disc displacement, Cervical spondylosis, Hard of hearing, Hyperlipidemia, Hypertension, Neoplasm, brain, Nephrolithiasis, Neurotrophic keratoconjunctivitis, and Osteoporosis     Scheduled Medications:  atorvastatin, 40 mg, Daily  carvediloL, 12.5 mg, BID  dexAMETHasone (Decadron) IV (PEDS and ADULTS), 4 mg, Q12H  diltiaZEM, 180 mg, Daily  erythromycin, , QID  gabapentin, 300 mg, TID  hydrALAZINE, 25 mg, Q8H  lactulose 10 gram/15 ml, 10 g, TID  losartan, 100 mg, Daily  pantoprazole, 40 mg, Daily  polyethylene glycol, 17 g, Daily    Continuous Infusions:   PRN Medications:  bisacodyL, 10 mg, Daily PRN  hydrALAZINE, 10 mg, Q4H PRN  labetalol, 20 mg, Q4H PRN  sodium chloride 0.9%, 10 mL, PRN    Calorie Containing IV Medications: no significant kcals from medications at this time    Recent Labs   Lab 06/11/25  0345 06/13/25  0554 06/14/25  0344 06/15/25  1056 06/16/25  0830    141 141  --  139   K 3.6 4.0 4.3  --  3.9   CALCIUM 9.8 8.9 8.1*  --  9.0   MG  --   --   --   --  2.10    104 107  --  104   CO2 25 29 27  --  29   BUN 35.8* 29.1* 29.4*  --  32.7*   CREATININE 0.85 0.71 0.65  --  0.60   EGFRNORACEVR >60 >60 >60  --  >60   * 129* 133*  --  138*   BILITOT 0.5  --   --   --   --    ALKPHOS 75  --   --   --   --    ALT 16  --   " --   --   --    AST 17  --   --   --   --    ALBUMIN 3.7  --   --   --   --    WBC 15.24* 12.02* 10.04 8.46 9.11   HGB 13.3 13.7 13.2 14.0 13.1   HCT 40.2 41.9 40.4 43.6 40.9     Nutrition Orders:  Diet NPO  Tube Feedings/Formulas 50; Isosource HN; Peg; 30; Other (see comments)  Appetite/Oral Intake: not applicable/NPO  Factors Affecting Nutritional Intake: chewing difficulty, difficulty/impaired swallowing, and NPO  Social Needs Impacting Access to Food: none identified  Food/Religion/Cultural Preferences: vanilla or strawberry oral supplement   Food Allergies: none reported  Last Bowel Movement: 06/09/25  Wound(s):      Comments    6/10/25 Reports poor appetite since 6/6/25, with nausea, vomiting and increased difficultly chewing/swallowing food. She did have a fair appetite prior to this, oral intake was inconsistent. Left side deficit from resected meningioma sx in 2020, would use right side of mouth for chewing without difficultly up until this past weekend. Family reports UBW ~130lbs in 2020 and has slowly lost unintentional weight. Thinks weight stable ~110lbs x1 year. Admit weight of 99lbs, unsure time frame of 10% weight loss. Will drink vanilla or strawberry ONS once diet resumed. Tube feeding recommendation above if unable to resume oral diet.     6/11/25 Tolerating tube feedings at 35mL/hr this morning. Denies any GI complaints.     6/13/25 PEG placement today. Was tolerating tube feedings at goal. Last BM 6/9, receiving stool softeners.     6/17/25 Tube feedings have been held many times since PEG placed last week. Currently on hold s/t "sour" stomach and reflux/belching. No nausea or vomiting reports. Receiving laxatives, stool softeners and enemas x2 days to promote bowel movements. Had one yesterday and a good one this morning. Plans to resume tube feedings. Will trial incremental advancement of bolus feeds (4-5 feeds daily) to allow rest time from pump. May need additional free water flushes until " "goal bolus tube feedings achieved.      Anthropometrics    Height: 5' 2" (157.5 cm),    Last Weight: 45.3 kg (99 lb 13.9 oz) (25 1017), Weight Method: Standard Scale   BMI 18.27kg/m^2  BMI Classification: underweight (BMI less than 18.5)     Ideal Body Weight (IBW), Female: 110 lb                          Usual Body Weight (UBW), k kg  % Usual Body Weight: 90.79     Usual Weight Provided By: patient and family/caregiver    Wt Readings from Last 5 Encounters:   25 45.3 kg (99 lb 13.9 oz)   24 47.3 kg (104 lb 3.2 oz)   24 49.8 kg (109 lb 12.6 oz)   24 49.9 kg (110 lb 0.2 oz)   23 49.9 kg (110 lb)     Weight Change(s) Since Admission:   Wt Readings from Last 1 Encounters:   25 1017 45.3 kg (99 lb 13.9 oz)   Admit Weight: 45.3 kg (99 lb 13.9 oz) (25 1017), Weight Method: Standard Scale    Estimated Needs    Weight Used For Calorie Calculations: 45.3 kg (99 lb 13.9 oz)  Energy Calorie Requirements (kcal): 1585-1812kacls/d (35-40kcals/kg) for weight gain  Energy Need Method: Kcal/kg  Weight Used For Protein Calculations: 45.3 kg (99 lb 13.9 oz)  Protein Requirements: 68g/d (1.5g/kg)   Fluid Requirements (mL): 1359mL fl/d (30ml/kg)        Enteral Nutrition  25 TF on hold  Formula: Isosource HN  Rate/Volume:   Water Flushes:   Additives/Modulars: none at this time  Route: PEG tube  Method: continuous  Total Nutrition Provided by Tube Feeding, Additives, and Flushes:  Calories Provided   kcal/d, % needs   Protein Provided   g/d, % needs   Fluid Provided   ml/d, % needs   Continuous feeding calculations based on estimated 20 hr/d run time unless otherwise stated.    Parenteral Nutrition     Patient not receiving parenteral nutrition support at this time.    Evaluation of Received Nutrient Intake    Calories: not meeting estimated needs  Protein: not meeting estimated needs    Patient Education     Not applicable.    Nutrition Diagnosis     PES: Inadequate energy intake " related to inability to consume sufficient nutrients as evidenced by likely <50% EER since 6/6/25. (resolved)     PES: Severe acute disease or injury related malnutrition Related to inability to consume sufficient nutrients  As Evidenced by:  - weight loss: Unable to assess - energy intake: <= 50% for 5 days (meets criteria for <= 50% >= 5 days (severe - acute)) - muscle mass depletion: 2 areas of moderate muscle loss (Temporalis, Clavicle) - loss of subcutaneous fat: 3 areas of mild fat loss (Buccal, Infraorbital, Triceps Skinfold) active    Nutrition Interventions     Intervention(s): collaboration with other providers  Intervention(s): Enteral nutrition    Goal: Meet greater than 80% of nutritional needs by follow-up. (goal progressing)  Goal: Tolerate enteral nutrition infusion at goal by follow-up. (New)    Nutrition Goals & Monitoring     Dietitian will monitor: energy intake, enteral nutrition intake, weight change, electrolyte/renal panel, beliefs/attitudes, glucose/endocrine profile, and gastrointestinal profile  Discharge planning: tube feeding (Isosource HN/Osmolite 1.2 or equivalent)  Nutrition Risk/Follow-Up: patient at increased nutrition risk; dietitian will follow-up twice weekly   Please consult if re-assessment needed sooner.

## 2025-06-17 NOTE — CONSULTS
Per report, patient with slightly worse slurred speech yesterday. Repeat imaging obtained with stable findings.  This AM she states she feels her speech is more clear. On examination she continues with left facial weakness but with better eye closure compared to last time I saw her.  Imaging reviewed. I explained to patient and family at bedside blood products will take time to evolve/resolve as well as surrounding edema. No changes in our plan re. Supportive care, no surgery, follow-up with interval imaging 6 weeks after initial presentation.  I have increased her steroids.  4mg BID for a week.  Then continue 2mg BID until she follows with Neurosurgery.    Michael Lagos MD  Neurosurgery  [facility]

## 2025-06-17 NOTE — PT/OT/SLP EVAL
[facility]  Speech Language Pathology Department  Cognitive-Communication Evaluation and dysphagia therapy    Patient Name:  Kaity Cuevas   MRN:  [id number]    Recommendations     General recommendations:  dysphagia therapy  Communication strategies:  provide increased time to answer    Discharge therapy intensity: High Intensity Therapy  Barriers to safe discharge: acuity of illness    History     Kaity Cuevas is a/n 64 y.o. 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  07/26/2021    COLONOSCOPY      cyberknife for left petroclival tumor  04/20/2009    ESOPHAGOGASTRODUODENOSCOPY W/ PEG N/A 6/13/2025    Procedure: PEG;  Surgeon: Dustin Priest MD;  Location: [facility] ENDOSCOPY;  Service: Endoscopy;  Laterality: N/A;    EYE SURGERY      HYSTERECTOMY  2009    Left C5-6, C6-7 posterior foraminotomies  08/20/2014    [place]    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 OR;  Service: Orthopedics;  Laterality: Right;       Previous level of Function  Lives: with  spouse  Handed: Right  Glasses: yes  Hearing Aids: no      Subjective     Patient awake and alert.  Patient goals: to get better   Spiritual/Cultural/Judaism Beliefs/Practices that affect care: no    Pain/Comfort: Pain Rating 1: 0/10    Respiratory Status:  room air    Objective     ORAL MUSCULATURE  Dentition: own teeth  Facial Movement: reduced left    SPEECH PRODUCTION  Voice Quality: adequate  Speech Rate: slow  Loudness: acceptable  Speech Intelligibility  Known Context: Greater that 90%  Unknown Context: Greater that 90%    AUDITORY COMPREHENSION  Identification:  Objects: 100%  Following Directions:  2-Step: 100%  Yes/No Questions:  Simple: 100%  Complex: 100%    VERBAL EXPRESSION  Automatic Speech:  Days of the week: 100%  Countin%  Wh- Questions:  Object name: 100%  Object function: 100%    COGNITION  Orientation:  Person: yes  Place: yes  Time: yes  Situation: yes   Attention:  Focused: Within Functional Limits  Sustained: Within Functional Limits  Memory:  Immediate: 100%  Delayed: 100%  Long Term: 100%  Problem Solving  Functional simple: 100%  Organization:  Convergent thinkin%  Divergent thinkin%    Dysphagia therapy:  Puree trials: ~5-8 seconds swallow delay  Thermal stimulation: 100% swallow response with 7-8 seconds delay. Reports more difficulty     Assessment     Patient presenting with increased slurring and acute onset (as of ) of neurogenic stuttering. Patient continues to present with dysphagia. SLP to follow up.    Goals     Multidisciplinary Problems       SLP Goals          Problem: SLP    Goal Priority Disciplines Outcome   SLP Goal     SLP Progressing   Description: LTG: The pt will tolerate least restrictive diet with no overt s/sx of aspiration.    STGs:  1. Patient will provide swallow response with delay < 2 seconds.  2. Patient will tolerate puree trials with no signs/sx of aspiration.  3. Patient will participate in repeat MBS.                       Patient  Education     Patient and spouse were provided with verbal education regarding SLP POC.  Understanding was verbalized.    Plan     SLP Follow-Up:  Yes   Patient to be seen:  5 x/week   Plan of Care expires:  07/01/25  Plan of Care reviewed with:  patient, spouse      Time Tracking     SLP Treatment Date:   06/17/25  Speech Start Time:  1415  Speech Stop Time:  1440     Speech Total Time (min):  25 min    Billable minutes:  Evaluation of Speech Sound Production with Comprehension and Expression, 15 minutes  Treatment of Swallow Dysfunction, 10 minutes     06/17/2025

## 2025-06-17 NOTE — PT/OT/SLP RE-EVAL
Physical Therapy Re-Evaluation    Patient Name:  Kaity Cuevas   MRN:  [id number]    Recommendations:     Discharge therapy intensity: High Intensity Therapy   Discharge Equipment Recommendations: to be determined by next level of care   Barriers to discharge: Impaired mobility and Ongoing medical needs    Assessment:     Kaity Cuevas is a 64 y.o. 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  She presents with the following impairments/functional limitations: weakness, impaired endurance, impaired self care skills, impaired functional mobility, gait instability, impaired balance, decreased coordination, impaired coordination Pt tolerated session fairly well. Pt continues to make improvement with therapy however PT still recommending high intensity therapy to maximize mobility. Pt still requires Ayala for bed mobility, sit<>stnd, and ambulation. Continues to demonstrate left lateral lean during mobility.     Rehab Prognosis: Good; patient would benefit from acute skilled PT services to address these deficits and reach maximum level of function.    Recent Surgery: Procedure(s) (LRB):  PEG (N/A) 4 Days Post-Op    Plan:     During this hospitalization, patient would benefit from acute PT services 6 x/week to address the identified rehab impairments via gait training, therapeutic activities, therapeutic exercises, neuromuscular re-education and progress toward the following goals:    Plan of Care Expires:  07/17/25    PT/PTA conference to discuss PT POC and patient's progression towards goals held with Nataliia Cooley PTA.     Subjective     Chief Complaint: NA  Patient/Family Comments/goals: to get better  Pain/Comfort:  Pain Rating 1: 0/10    Patients cultural, spiritual, Shinto conflicts given the current situation: no    Objective:     Communicated with nsg prior to session.  Patient found supine with PEG Tube,  peripheral IV, telemetry  upon PT entry to room.    General Precautions: Standard, fall, aspiration, SBP<160  Orthopedic Precautions:N/A   Braces: N/A  Respiratory Status: Room air  Blood Pressure: 143/84      Exams:  RLE Strength: WFL  LLE Strength: WFL  Skin integrity: Visible skin intact      Functional Mobility:  Bed Mobility:     Supine to Sit: minimum assistance  Transfers:     Sit to Stand:  minimum assistance with rolling walker  X10 Ayala with education on hand placement on chairs arm rest  Gait: Pt ambulated 40ft Ayala with RW. Pt demos L lateral lean with minor cueing to correct. Pt with NBOS and intermittent ataxic gait. Decreased sonia and unsteadiness throughout gait.  X3 LOB requiring assist to recover.   [facility]one Taps: x10 BLE Ayala      AM-PAC 6 CLICK MOBILITY  Total Score:18     Co-Treatment: No    Patient and spouse were provided with verbal education education regarding PT role/goals/POC, fall prevention, safety awareness, and discharge/DME recommendations.  Understanding was verbalized.     Patient left up in chair with all lines intact, call button in reach, and spouce present.    GOALS:   Multidisciplinary Problems       Physical Therapy Goals          Problem: Physical Therapy    Goal Priority Disciplines Outcome Interventions   Physical Therapy Goal     PT, PT/OT Progressing    Description: Goals to be met by: 7/10/2025     Patient will increase functional independence with mobility by performin. Supine to sit with Howard  2. Sit to supine with Howard  3. Sit to stand transfer with Howard  4. Gait  x 100 feet with Howard using No Assistive Device.                          History:     Past Medical History:   Diagnosis Date    Benign meningioma     Carpal tunnel syndrome on right     Cataract     Cervical disc displacement     Cervical spondylosis     Hard of hearing     Hyperlipidemia     Hypertension     Neoplasm, brain     Nephrolithiasis     Neurotrophic  keratoconjunctivitis     Osteoporosis        Past Surgical History:   Procedure Laterality Date    BRAIN SURGERY  06/04/2020    CARPAL TUNNEL RELEASE Right 06/07/2024    Procedure: RELEASE, CARPAL TUNNEL;  Surgeon: Yuriy Stark MD;  Location: [facility];  Service: Orthopedics;  Laterality: Right;    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  07/26/2021    COLONOSCOPY      cyberknife for left petroclival tumor  04/20/2009    ESOPHAGOGASTRODUODENOSCOPY W/ PEG N/A 6/13/2025    Procedure: PEG;  Surgeon: Dustin Priest MD;  Location: [facility] ENDOSCOPY;  Service: Endoscopy;  Laterality: N/A;    EYE SURGERY      HYSTERECTOMY  2009    Left C5-6, C6-7 posterior foraminotomies  08/20/2014    [place]    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

## 2025-06-17 NOTE — PROGRESS NOTES
Inpatient Nutrition Assessment    Admit Date: 2025   Total duration of encounter: 8 days   Patient Age: 64 y.o.    Nutrition Recommendation/Prescription     Starting bolus tube feeding recommendations 4-5x daily to allow time away from continuous infusion.   -1st bolus volume of 50mL Isosource HN + 30mL flush before and after. If tolerates, repeat same bolus after 4 hr.   -Advance volume of boluses by 30mL q8-12hr only as tolerated.   (Example advancement of feeds q 4hr as toleratinmL, 50mL, 80mL, 80mL, 110mL, 110mL, 140mL, 140mL, and so forth to goal)    Goal bolus tube feeding recommendations  250mL Isosource HN 4 daily or a total of 1000mL Isosource HN spread throughout the day will provide  1200kcals/d (95% est needs)  54g protein/d (79% est needs)  808mL fl/d (60% est needs)    Medical management of reflux.     Continue daily bowel regimen and ensure adequate bowel movements.     Continue NPO until cleared for oral diet. Texture modifications per SLP.     Communication of Recommendations: reviewed with nurse, reviewed with patient, and reviewed with family    Nutrition Assessment     Malnutrition Assessment/Nutrition-Focused Physical Exam    Malnutrition Context: acute illness or injury (06/10/25 1043)  Malnutrition Level: severe (06/10/25 1043)  Energy Intake (Malnutrition): less than or equal to 50% for greater than or equal to 5 days (06/10/25 1043)  Weight Loss (Malnutrition): other (see comments) (Unable to assess) (06/10/25 1043)  Subcutaneous Fat (Malnutrition): mild depletion (06/10/25 1043)  Orbital Region (Subcutaneous Fat Loss): mild depletion  Upper Arm Region (Subcutaneous Fat Loss): mild depletion     Muscle Mass (Malnutrition): moderate depletion (06/10/25 1043)  [place] Region (Muscle Loss): moderate depletion  Clavicle Bone Region (Muscle Loss): moderate depletion                    Fluid Accumulation (Malnutrition): other (see comments) (Not present) (06/10/25 1043)        A minimum of  two characteristics is recommended for diagnosis of either severe or non-severe malnutrition.    Chart Review    Reason Seen: malnutrition screening tool (MST) and follow-up    Malnutrition Screening Tool Results   Have you recently lost weight without trying?: Unsure  Have you been eating poorly because of a decreased appetite?: Yes   MST Score: 3   Diagnosis:  Worsening left facial droop, left-sided weakness  History of benign meningioma resected in 2020 followed by Neurosurgery with serial MRI  --with chronic left facial deficits as well as left sided weakness  HLD  Hypertension    Relevant Medical History: Benign meningioma, Carpal tunnel syndrome on right, Cataract, Cervical disc displacement, Cervical spondylosis, Hard of hearing, Hyperlipidemia, Hypertension, Neoplasm, brain, Nephrolithiasis, Neurotrophic keratoconjunctivitis, and Osteoporosis     Scheduled Medications:  atorvastatin, 40 mg, Daily  bisacodyL, 10 mg, Once  carvediloL, 12.5 mg, BID  dexAMETHasone (Decadron) IV (PEDS and ADULTS), 4 mg, Q12H  diltiaZEM, 180 mg, Daily  erythromycin, , QID  gabapentin, 300 mg, TID  hydrALAZINE, 25 mg, Q8H  lactulose 10 gram/15 ml, 10 g, TID  losartan, 100 mg, Daily  pantoprazole, 40 mg, Daily  polyethylene glycol, 17 g, Daily    Continuous Infusions:   PRN Medications:  bisacodyL, 10 mg, Daily PRN  hydrALAZINE, 10 mg, Q4H PRN  labetalol, 20 mg, Q4H PRN  sodium chloride 0.9%, 10 mL, PRN    Calorie Containing IV Medications: no significant kcals from medications at this time    Recent Labs   Lab 06/11/25  0345 06/13/25  0554 06/14/25  0344 06/15/25  1056 06/16/25  0830    141 141  --  139   K 3.6 4.0 4.3  --  3.9   CALCIUM 9.8 8.9 8.1*  --  9.0   MG  --   --   --   --  2.10    104 107  --  104   CO2 25 29 27  --  29   BUN 35.8* 29.1* 29.4*  --  32.7*   CREATININE 0.85 0.71 0.65  --  0.60   EGFRNORACEVR >60 >60 >60  --  >60   * 129* 133*  --  138*   BILITOT 0.5  --   --   --   --    ALKPHOS 75  --    "--   --   --    ALT 16  --   --   --   --    AST 17  --   --   --   --    ALBUMIN 3.7  --   --   --   --    WBC 15.24* 12.02* 10.04 8.46 9.11   HGB 13.3 13.7 13.2 14.0 13.1   HCT 40.2 41.9 40.4 43.6 40.9     Nutrition Orders:  Diet NPO  Tube Feedings/Formulas 55; 1,100; Isosource HN; NG; 50; Every 2 hours  Appetite/Oral Intake: not applicable/NPO  Factors Affecting Nutritional Intake: chewing difficulty, difficulty/impaired swallowing, and NPO  Social Needs Impacting Access to Food: none identified  Food/[employer]/Cultural Preferences: vanilla or strawberry oral supplement   Food Allergies: none reported  Last Bowel Movement: 06/09/25  Wound(s):      Comments    6/10/25 Reports poor appetite since 6/6/25, with nausea, vomiting and increased difficultly chewing/swallowing food. She did have a fair appetite prior to this, oral intake was inconsistent. Left side deficit from resected meningioma sx in 2020, would use right side of mouth for chewing without difficultly up until this past weekend. Family reports UBW ~130lbs in 2020 and has slowly lost unintentional weight. Thinks weight stable ~110lbs x1 year. Admit weight of 99lbs, unsure time frame of 10% weight loss. Will drink vanilla or strawberry ONS once diet resumed. Tube feeding recommendation above if unable to resume oral diet.     6/11/25 Tolerating tube feedings at 35mL/hr this morning. Denies any GI complaints.     6/13/25 PEG placement today. Was tolerating tube feedings at goal. Last BM 6/9, receiving stool softeners.     6/17/25 Tube feedings have been held many times since PEG placed last week. Currently on hold s/t "sour" stomach and reflux/belching. No nausea or vomiting reports. Receiving laxatives, stool softeners and enemas x2 days to promote bowel movements. Had one yesterday and a good one this morning. Plans to resume tube feedings. Will trial incremental advancement of bolus feeds (4-5 feeds daily) to allow rest time from pump. May need " "additional free water flushes until goal bolus tube feedings achieved.      Anthropometrics    Height: 5' 2" (157.5 cm),    Last Weight: 45.3 kg (99 lb 13.9 oz) (25 1017), Weight Method: Standard Scale   BMI 18.27kg/m^2  BMI Classification: underweight (BMI less than 18.5)     Ideal Body Weight (IBW), Female: 110 lb                          Usual Body Weight (UBW), k kg  % Usual Body Weight: 90.79     Usual Weight Provided By: patient and family/caregiver    Wt Readings from Last 5 Encounters:   25 45.3 kg (99 lb 13.9 oz)   24 47.3 kg (104 lb 3.2 oz)   24 49.8 kg (109 lb 12.6 oz)   24 49.9 kg (110 lb 0.2 oz)   23 49.9 kg (110 lb)     Weight Change(s) Since Admission:   Wt Readings from Last 1 Encounters:   25 1017 45.3 kg (99 lb 13.9 oz)   Admit Weight: 45.3 kg (99 lb 13.9 oz) (25 1017), Weight Method: Standard Scale    Estimated Needs    Weight Used For Calorie Calculations: 45.3 kg (99 lb 13.9 oz)  Energy Calorie Requirements (kcal): 1585-1812kacls/d (35-40kcals/kg) for weight gain  Energy Need Method: Kcal/kg  Weight Used For Protein Calculations: 45.3 kg (99 lb 13.9 oz)  Protein Requirements: 68g/d (1.5g/kg)   Fluid Requirements (mL): 1359mL fl/d (30ml/kg)        Enteral Nutrition  25 TF on hold  Formula: Isosource HN  Rate/Volume:   Water Flushes:   Additives/Modulars: none at this time  Route: PEG tube  Method: continuous  Total Nutrition Provided by Tube Feeding, Additives, and Flushes:  Calories Provided   kcal/d, % needs   Protein Provided   g/d, % needs   Fluid Provided   ml/d, % needs   Continuous feeding calculations based on estimated 20 hr/d run time unless otherwise stated.    Parenteral Nutrition     Patient not receiving parenteral nutrition support at this time.    Evaluation of Received Nutrient Intake    Calories: not meeting estimated needs  Protein: not meeting estimated needs    Patient Education     Not applicable.    Nutrition " Diagnosis     PES: Inadequate energy intake related to inability to consume sufficient nutrients as evidenced by likely <50% EER since 6/6/25. (resolved)     PES: Severe acute disease or injury related malnutrition Related to inability to consume sufficient nutrients  As Evidenced by:  - weight loss: Unable to assess - energy intake: <= 50% for 5 days (meets criteria for <= 50% >= 5 days (severe - acute)) - muscle mass depletion: 2 areas of moderate muscle loss (Temporalis, Clavicle) - loss of subcutaneous fat: 3 areas of mild fat loss (Buccal, Infraorbital, Triceps Skinfold) new    Nutrition Interventions     Intervention(s): collaboration with other providers  Intervention(s): Enteral nutrition;Oral nutritional supplement;Feeding assistance/management;Oral diet/nutrient modifications    Goal: Meet greater than 80% of nutritional needs by follow-up. (goal progressing)  Goal: Tolerate enteral nutrition infusion at goal by follow-up. (New)    Nutrition Goals & Monitoring     Dietitian will monitor: energy intake, enteral nutrition intake, weight change, electrolyte/renal panel, beliefs/attitudes, glucose/endocrine profile, and gastrointestinal profile  Discharge planning: tube feeding (Isosource HN/Osmolite 1.2 or equivalent)  Nutrition Risk/Follow-Up: patient at increased nutrition risk; dietitian will follow-up twice weekly   Please consult if re-assessment needed sooner.

## 2025-06-17 NOTE — PROGRESS NOTES
[facility]  Hospital Medicine Progress Note        Chief Complaint: Inpatient Follow-up for     HPI:   64 year old with a history that includes meningioma, s/p resection 2020 and radiation x 2 (2009 and 2021), presented to ED 6/9 with worsening left sided weakness.  CT head non-specific findings in the right frontal lobe suspicious for acute infarct; in addition noting interval enlargement of left CP meningioma since 07/18/2024.   Patient was admitted to  services; Neurology and Neurosurgery.  Subsequent MRI ruled out acute CVA, noting residual meningioma at the cerebellopontine angle, larger than the prior examination and with associated surrounding edema. Patient started on IV decadron.  Neurosurgery on board.  Neurology signed off.      Patient failed swallow. Will consult GI for PEG tube. Continue steroid taper as per neurosurgery. S/p PEG tube on 6/13.    Interval Hx:   NAEO. Seen and examined. Difficulty tolerating tube feeds because she has not had a bowel movement.     Case was discussed with patient's nurse and  on the floor.    Objective/physical exam:  General: In no acute distress, afebrile. NG tube in placed  Chest: Clear to auscultation bilaterally  Heart: RRR, +S1, S2, no appreciable murmur  Abdomen: Soft, nontender, BS +  MSK: Warm, no lower extremity edema, no clubbing or cyanosis  Neurologic: Alert and oriented x4, Cranial nerve II-XII intact, Strength 5/5 in all 4 extremities    VITAL SIGNS: 24 HRS MIN & MAX LAST   Temp  Min: 97.4 °F (36.3 °C)  Max: 97.9 °F (36.6 °C) 97.6 °F (36.4 °C)   BP  Min: 93/58  Max: 161/85 (!) 159/81   Pulse  Min: 82  Max: 97  83   Resp  Min: 14  Max: 16 15   SpO2  Min: 97 %  Max: 100 % 100 %     I have reviewed the following labs:  Recent Labs   Lab 06/14/25  0344 06/15/25  1056 06/16/25  0830   WBC 10.04 8.46 9.11   RBC 4.82 5.14 4.84   HGB 13.2 14.0 13.1   HCT 40.4 43.6 40.9   MCV 83.8 84.8 84.5   MCH 27.4 27.2 27.1   MCHC  32.7* 32.1* 32.0*   RDW 13.9 13.5 13.4    308 281   MPV 10.1 10.0 10.2     Recent Labs   Lab 06/11/25  0345 06/13/25  0554 06/14/25  0344 06/16/25  0830    141 141 139   K 3.6 4.0 4.3 3.9    104 107 104   CO2 25 29 27 29   BUN 35.8* 29.1* 29.4* 32.7*   CREATININE 0.85 0.71 0.65 0.60   * 129* 133* 138*   CALCIUM 9.8 8.9 8.1* 9.0   MG  --   --   --  2.10   ALBUMIN 3.7  --   --   --    PROT 7.5  --   --   --    ALKPHOS 75  --   --   --    ALT 16  --   --   --    AST 17  --   --   --    BILITOT 0.5  --   --   --      Microbiology Results (last 7 days)       ** No results found for the last 168 hours. **             See below for Radiology    Assessment/Plan:  # Residual meningioma at the cerebellopontine angle appears larger than the prior examination and there is some increased surrounding edema.  # Old area of focal hemorrhage or calcification in the posterior cerebral convexity on the right side  # Hx of meningioma, s/p resection 2020 and radiation x 2 (2009 and 2021)  # HTN  # Dysphagia 2/2 meningioma  # Steroid induced leukocytosis    - neurology and neurosurgery recommendations appreciated   - plan to repeat scans in 6 weeks to determine if additional radiation is needed   - decadron changed to 4 mg BID for a week and then 2 mg BID until follow up   - Repeat CTH and CTA head and neck noted on 6/15   - I will reconsult neurosurgery; recs appreciated  - consult GI for PEG tube. I spoke with Dr. Lagos; he thinks low probability of obtaining swallowing function with the steroids   - s/p PEG tube on 6/13   - started on tube feeds on 6/14; spoke with dietary to switch to bolus feeds   - aggressive bowel regimen will help with her tube feeds. Suppository, enema, lactulose  - IV PPI while on steroids  - continue losartan 100 mg daily. Continue hydralazine 25 mg q8hr. Continue home diltiazem and increase coreg 12.5mg BID  - PT/OT/SLP  - aspiration precautions  - discharge later on today vs  tomorrow depending on how tube feeds are tolerated    VTE prophylaxis: SCD    Patient condition:  Guarded    Anticipated discharge and Disposition:     discharge later on today vs tomorrow depending on how tube feeds are tolerated    All diagnosis and differential diagnosis have been reviewed; assessment and plan has been documented; I have personally reviewed the labs and test results that are presently available; I have reviewed the patients medication list; I have reviewed the consulting providers response and recommendations. I have reviewed or attempted to review medical records based upon their availability    All of the patient's questions have been  addressed and answered. Patient's is agreeable to the above stated plan. I will continue to monitor closely and make adjustments to medical management as needed.    _____________________________________________________________________    Malnutrition Status:  Nutrition consulted. Most recent weight and BMI monitored-     Measurements:  Wt Readings from Last 1 Encounters:   06/09/25 45.3 kg (99 lb 13.9 oz)   Body mass index is 18.27 kg/m².    Patient has been screened and assessed by RD.    Malnutrition Type:  Context: acute illness or injury  Level: severe    Malnutrition Characteristic Summary:  Weight Loss (Malnutrition): other (see comments) (Unable to assess)  Energy Intake (Malnutrition): less than or equal to 50% for greater than or equal to 5 days  Subcutaneous Fat (Malnutrition): mild depletion  Muscle Mass (Malnutrition): moderate depletion  Fluid Accumulation (Malnutrition): other (see comments) (Not present)    Interventions/Recommendations (treatment strategy):  Enteral nutrition     Scheduled Med:   atorvastatin  40 mg Per G Tube Daily    carvediloL  12.5 mg Per G Tube BID    dexAMETHasone (Decadron) IV (PEDS and ADULTS)  4 mg Intravenous Q12H    diltiaZEM  180 mg Oral Daily    erythromycin   Left Eye QID    gabapentin  300 mg Per G Tube TID     hydrALAZINE  25 mg Per G Tube Q8H    lactulose 10 gram/15 ml  10 g Per G Tube TID    losartan  100 mg Per G Tube Daily    pantoprazole  40 mg Intravenous Daily    polyethylene glycol  17 g Per G Tube Daily      Continuous Infusions:     PRN Meds:    Current Facility-Administered Medications:     bisacodyL, 10 mg, Rectal, Daily PRN    hydrALAZINE, 10 mg, Intravenous, Q4H PRN    labetalol, 20 mg, Intravenous, Q4H PRN    sodium chloride 0.9%, 10 mL, Intravenous, PRN     Radiology:  I have personally reviewed the following imaging and agree with the radiologist.     X-Ray Abdomen AP 1 View  EXAMINATION  XR ABDOMEN AP 1 VIEW    CLINICAL HISTORY  residual tube feeds, non absorbing;    TECHNIQUE  A total of 1 AP image(s) of the abdomen.    COMPARISON  10 Sabrina 2025    FINDINGS  Lines/tubes/devices: Enteric tube no longer visualized.  Interval placement of a left upper quadrant percutaneous gastrostomy tube is evident.    There are no interval changes to suggest new or worsening high-grade mechanical bowel obstruction.  No intra-abdominal mass effect is developed.  Detection of air-fluid levels and low-volume pneumoperitoneum is limited due to supine technique. Scattered residual enteric contrast is present through the descending colon.  Excreted contrast also opacifies the bilateral renal collecting system and urinary bladder.    Included lower thoracic cavity and osseous structures are similar in comparison.    IMPRESSION  1. Interval placement of left upper quadrant PEG tube.  2. No definite new/worsened intra-abdominal abnormality.    Electronically signed by: Carlo Badillo  Date:    06/16/2025  Time:    15:55  CTA Head and Neck (xpd)  Narrative: EXAMINATION:  CTA HEAD AND NECK (XPD)    CLINICAL HISTORY:  worsening left sided facial droop;    TECHNIQUE:  Axial images obtained through the cervical region and [place] of [place] before and after the administration of intravenous contrast.    Coronal, sagittal, MIP and 3D  reconstructions were obtained from the axial data.    Automatic exposure control was utilized to limit radiation dose.    Radiation Dose:    Total DLP: 1494 mGy*cm    COMPARISON:  CT head dated 06/16/2025, CTA head neck dated 06/09/2025, MRI brain dated 06/09/2025    FINDINGS:  Head CT with contrast:    Similar appearance of left middle cerebral peduncle hemorrhage and residual enhancing left CP angle meningioma.    No enhancing abnormalities.    If present, stenosis of the carotid bulbs is measured based on NASCET criteria,    i.e. area of maximal stenosis compared to the cervical ICA distal to the bulb.    Cervical CTA:    The origins of the great vessels are patent with mild scattered calcifications    The common carotids are patent.  There are mild calcifications the carotid bulbs without hemodynamically stenosis.  The internal carotid are patent.    The vertebral arteries are patent.    Intracranial CTA:    There are calcifications in the course the carotid siphons with mild narrowing of the right supraclinoid segment.  The middle cerebral arteries and anterior cerebral is are patent    The vertebral arteries, basilar artery and posterior cerebral arteries are patent.    The dural venous sinuses are patent.  Impression: No large vessel occlusion or flow-limiting stenosis.  No significant interval change.    Electronically signed by: Maddie Benavides  Date:    06/16/2025  Time:    13:14  CT Head Without Contrast  Narrative: EXAMINATION:  CT HEAD WITHOUT CONTRAST    CLINICAL HISTORY:  Neuro deficit, acute, stroke suspected;Worsening facial droop;    TECHNIQUE:  Multiple axial images were obtained from the base of the brain to the vertex without contrast administration.  Sagittal and coronal reconstructions were performed. .Automatic exposure control  (AEC) is utilized to reduce patient radiation exposure.    COMPARISON:  06/10/2025    FINDINGS:  The patient has undergone previous craniotomy in the left temporal  region with encephalomalacia in the left temporal lobe.  This was seen on the prior examination as well.    There is a cerebellopontine angle meningioma seen which was seen on the prior examination from 06/09/2025 as well.  It is better visualized on the MRI.  There is a persistent hemorrhage seen along the cerebral peduncle on the left side which was seen on the prior examination as well.  There is surrounding cytotoxic edema.  The area of hemorrhage is similar to the prior examination.  No new areas of hemorrhage are seen.    Paranasal sinuses shows a mucous retention cyst in the left maxillary sinus.  Impression: Stable exam with stable area of hemorrhage in the left cerebral peduncle and stable left cerebellopontine angle meningioma which is better visualized on the postcontrast MRI    Stable area of encephalomalacia in the left temporal region with craniotomy in the left temporal region    Mucous retention cyst in the left maxillary sinus    Electronically signed by: Hernando Najera  Date:    06/16/2025  Time:    12:58      Ian Elise MD  Department of Hospital Medicine   [facility]   06/17/2025

## 2025-06-17 NOTE — PLAN OF CARE
Problem: Adult Inpatient Plan of Care  Goal: Plan of Care Review  Outcome: Progressing  Flowsheets (Taken 6/17/2025 0325)  Plan of Care Reviewed With: patient     Problem: Infection  Goal: Absence of Infection Signs and Symptoms  Intervention: Prevent or Manage Infection  Flowsheets (Taken 6/17/2025 0325)  Infection Management: aseptic technique maintained     Problem: Stroke, Ischemic (Includes Transient Ischemic Attack)  Goal: Optimal Nutrition Intake  Intervention: Promote and Optimize Fluid and Food Intake  Flowsheets (Taken 6/17/2025 0325)  Nutrition Interventions: (tube feeds) other (see comments)     Problem: Stroke, Ischemic (Includes Transient Ischemic Attack)  Goal: Safe and Effective Swallow  Intervention: Promote and Optimize Fluid and Food Intake  Flowsheets (Taken 6/17/2025 0325)  Aspiration Precautions: NPO pending swallow screening/evaluation  Feeding/Eating Techniques: (tube feeds) other (see comments)     Problem: Fall Injury Risk  Goal: Absence of Fall and Fall-Related Injury  Intervention: Promote Injury-Free Environment  Flowsheets (Taken 6/17/2025 0325)  Safety Promotion/Fall Prevention:   assistive device/personal item within reach   bed alarm set   Fall Risk signage in place   Fall Risk reviewed with patient/family   family to remain at bedside   side rails raised x 3

## 2025-06-18 PROCEDURE — 92526 ORAL FUNCTION THERAPY: CPT

## 2025-06-18 PROCEDURE — 21400001 HC TELEMETRY ROOM

## 2025-06-18 PROCEDURE — 25000003 PHARM REV CODE 250: Performed by: INTERNAL MEDICINE

## 2025-06-18 PROCEDURE — 25000003 PHARM REV CODE 250

## 2025-06-18 PROCEDURE — 97116 GAIT TRAINING THERAPY: CPT

## 2025-06-18 PROCEDURE — 63600175 PHARM REV CODE 636 W HCPCS

## 2025-06-18 PROCEDURE — 63600175 PHARM REV CODE 636 W HCPCS: Performed by: STUDENT IN AN ORGANIZED HEALTH CARE EDUCATION/TRAINING PROGRAM

## 2025-06-18 PROCEDURE — 97530 THERAPEUTIC ACTIVITIES: CPT | Mod: CO

## 2025-06-18 RX ORDER — BISACODYL 10 MG/1
10 SUPPOSITORY RECTAL ONCE
Status: COMPLETED | OUTPATIENT
Start: 2025-06-18 | End: 2025-06-18

## 2025-06-18 RX ORDER — METOCLOPRAMIDE 10 MG/1
10 TABLET ORAL
Status: DISCONTINUED | OUTPATIENT
Start: 2025-06-18 | End: 2025-06-20 | Stop reason: HOSPADM

## 2025-06-18 RX ORDER — LACTULOSE 10 G/15ML
200 SOLUTION ORAL; RECTAL ONCE
Status: COMPLETED | OUTPATIENT
Start: 2025-06-18 | End: 2025-06-18

## 2025-06-18 RX ADMIN — CARVEDILOL 12.5 MG: 12.5 TABLET, FILM COATED ORAL at 09:06

## 2025-06-18 RX ADMIN — METOCLOPRAMIDE 10 MG: 10 TABLET ORAL at 04:06

## 2025-06-18 RX ADMIN — GABAPENTIN 300 MG: 300 CAPSULE ORAL at 02:06

## 2025-06-18 RX ADMIN — PANTOPRAZOLE SODIUM 40 MG: 40 INJECTION, POWDER, FOR SOLUTION INTRAVENOUS at 09:06

## 2025-06-18 RX ADMIN — GABAPENTIN 300 MG: 300 CAPSULE ORAL at 09:06

## 2025-06-18 RX ADMIN — ATORVASTATIN CALCIUM 40 MG: 40 TABLET, FILM COATED ORAL at 09:06

## 2025-06-18 RX ADMIN — DILTIAZEM HYDROCHLORIDE 180 MG: 180 CAPSULE, COATED, EXTENDED RELEASE ORAL at 09:06

## 2025-06-18 RX ADMIN — BISACODYL 10 MG: 10 SUPPOSITORY RECTAL at 09:06

## 2025-06-18 RX ADMIN — DEXAMETHASONE SODIUM PHOSPHATE 4 MG: 4 INJECTION, SOLUTION INTRA-ARTICULAR; INTRALESIONAL; INTRAMUSCULAR; INTRAVENOUS; SOFT TISSUE at 08:06

## 2025-06-18 RX ADMIN — ERYTHROMYCIN: 5 OINTMENT OPHTHALMIC at 12:06

## 2025-06-18 RX ADMIN — DEXAMETHASONE SODIUM PHOSPHATE 4 MG: 4 INJECTION, SOLUTION INTRA-ARTICULAR; INTRALESIONAL; INTRAMUSCULAR; INTRAVENOUS; SOFT TISSUE at 09:06

## 2025-06-18 RX ADMIN — LOSARTAN POTASSIUM 100 MG: 50 TABLET, FILM COATED ORAL at 09:06

## 2025-06-18 RX ADMIN — ERYTHROMYCIN: 5 OINTMENT OPHTHALMIC at 09:06

## 2025-06-18 RX ADMIN — LACTULOSE 10 G: 10 SOLUTION ORAL at 09:06

## 2025-06-18 RX ADMIN — ERYTHROMYCIN: 5 OINTMENT OPHTHALMIC at 04:06

## 2025-06-18 RX ADMIN — METOCLOPRAMIDE 10 MG: 10 TABLET ORAL at 12:06

## 2025-06-18 RX ADMIN — POLYETHYLENE GLYCOL 3350 17 G: 17 POWDER, FOR SOLUTION ORAL at 09:06

## 2025-06-18 RX ADMIN — LACTULOSE SOLUTION USP, 10 G/15 ML 200 G: 10 SOLUTION ORAL; RECTAL at 09:06

## 2025-06-18 RX ADMIN — HYDRALAZINE HYDROCHLORIDE 25 MG: 25 TABLET ORAL at 05:06

## 2025-06-18 RX ADMIN — LACTULOSE 10 G: 10 SOLUTION ORAL at 02:06

## 2025-06-18 RX ADMIN — HYDRALAZINE HYDROCHLORIDE 25 MG: 25 TABLET ORAL at 02:06

## 2025-06-18 NOTE — PROGRESS NOTES
[facility]  Hospital Medicine Progress Note        Chief Complaint: Inpatient Follow-up for     HPI:   64 year old with a history that includes meningioma, s/p resection 2020 and radiation x 2 (2009 and 2021), presented to ED 6/9 with worsening left sided weakness.  CT head non-specific findings in the right frontal lobe suspicious for acute infarct; in addition noting interval enlargement of left CP meningioma since 07/18/2024.   Patient was admitted to  services; Neurology and Neurosurgery.  Subsequent MRI ruled out acute CVA, noting residual meningioma at the cerebellopontine angle, larger than the prior examination and with associated surrounding edema. Patient started on IV decadron.  Neurosurgery on board.  Neurology signed off.      Patient failed swallow. Will consult GI for PEG tube. Continue steroid taper as per neurosurgery. S/p PEG tube on 6/13.    Interval Hx:   NAEO. Seen and examined. Had bowel movement and again with difficulty tolerating tube feeds.    Case was discussed with patient's nurse and  on the floor.    Objective/physical exam:  General: In no acute distress, afebrile. NG tube in placed  Chest: Clear to auscultation bilaterally  Heart: RRR, +S1, S2, no appreciable murmur  Abdomen: Soft, nontender, BS +  MSK: Warm, no lower extremity edema, no clubbing or cyanosis  Neurologic: Alert and oriented x4, Cranial nerve II-XII intact, Strength 5/5 in all 4 extremities    VITAL SIGNS: 24 HRS MIN & MAX LAST   Temp  Min: 97.5 °F (36.4 °C)  Max: 98.2 °F (36.8 °C) 97.5 °F (36.4 °C)   BP  Min: 123/70  Max: 159/90 (!) 145/78   Pulse  Min: 83  Max: 94  84   No data recorded 15   SpO2  Min: 96 %  Max: 99 % 98 %     I have reviewed the following labs:  Recent Labs   Lab 06/14/25  0344 06/15/25  1056 06/16/25  0830   WBC 10.04 8.46 9.11   RBC 4.82 5.14 4.84   HGB 13.2 14.0 13.1   HCT 40.4 43.6 40.9   MCV 83.8 84.8 84.5   MCH 27.4 27.2 27.1   MCHC 32.7* 32.1* 32.0*    RDW 13.9 13.5 13.4    308 281   MPV 10.1 10.0 10.2     Recent Labs   Lab 06/13/25  0554 06/14/25  0344 06/16/25  0830    141 139   K 4.0 4.3 3.9    107 104   CO2 29 27 29   BUN 29.1* 29.4* 32.7*   CREATININE 0.71 0.65 0.60   * 133* 138*   CALCIUM 8.9 8.1* 9.0   MG  --   --  2.10     Microbiology Results (last 7 days)       ** No results found for the last 168 hours. **             See below for Radiology    Assessment/Plan:  # Residual meningioma at the cerebellopontine angle appears larger than the prior examination and there is some increased surrounding edema.  # Old area of focal hemorrhage or calcification in the posterior cerebral convexity on the right side  # Hx of meningioma, s/p resection 2020 and radiation x 2 (2009 and 2021)  # HTN  # Dysphagia 2/2 meningioma  # Steroid induced leukocytosis    - neurology and neurosurgery recommendations appreciated   - plan to repeat scans in 6 weeks to determine if additional radiation is needed   - decadron changed to 4 mg BID for a week and then 2 mg BID until follow up   - Repeat CTH and CTA head and neck noted on 6/15   - I will reconsult neurosurgery; recs appreciated  - consult GI for PEG tube. I spoke with Dr. Lagos; he thinks low probability of obtaining swallowing function with the steroids   - s/p PEG tube on 6/13   - started on tube feeds on 6/14; spoke with dietary to switch to bolus feeds   - aggressive bowel regimen will help with her tube feeds. Suppository, enema, lactulose   - spoke with dietary and will add reglan  - IV PPI while on steroids  - continue losartan 100 mg daily. Continue hydralazine 25 mg q8hr. Continue home diltiazem and increase coreg 12.5mg BID  - PT/OT/SLP  - aspiration precautions  - once tube feeds tolerated    VTE prophylaxis: SCD    Patient condition:  Guarded    Anticipated discharge and Disposition:     Once tube feeds tolerated    All diagnosis and differential diagnosis have been reviewed;  assessment and plan has been documented; I have personally reviewed the labs and test results that are presently available; I have reviewed the patients medication list; I have reviewed the consulting providers response and recommendations. I have reviewed or attempted to review medical records based upon their availability    All of the patient's questions have been  addressed and answered. Patient's is agreeable to the above stated plan. I will continue to monitor closely and make adjustments to medical management as needed.    _____________________________________________________________________    Malnutrition Status:  Nutrition consulted. Most recent weight and BMI monitored-     Measurements:  Wt Readings from Last 1 Encounters:   06/09/25 45.3 kg (99 lb 13.9 oz)   Body mass index is 18.27 kg/m².    Patient has been screened and assessed by RD.    Malnutrition Type:  Context: acute illness or injury  Level: severe    Malnutrition Characteristic Summary:  Weight Loss (Malnutrition): other (see comments) (Unable to assess)  Energy Intake (Malnutrition): less than or equal to 50% for greater than or equal to 5 days  Subcutaneous Fat (Malnutrition): mild depletion  Muscle Mass (Malnutrition): moderate depletion  Fluid Accumulation (Malnutrition): other (see comments) (Not present)    Interventions/Recommendations (treatment strategy):  Enteral nutrition     Scheduled Med:   atorvastatin  40 mg Per G Tube Daily    carvediloL  12.5 mg Per G Tube BID    dexAMETHasone (Decadron) IV (PEDS and ADULTS)  4 mg Intravenous Q12H    diltiaZEM  180 mg Oral Daily    erythromycin   Left Eye QID    gabapentin  300 mg Per G Tube TID    hydrALAZINE  25 mg Per G Tube Q8H    lactulose 10 gram/15 ml  10 g Per G Tube TID    losartan  100 mg Per G Tube Daily    metoclopramide HCl  10 mg Per G Tube TID AC    pantoprazole  40 mg Intravenous Daily    polyethylene glycol  17 g Per G Tube Daily      Continuous Infusions:     PRN  Meds:    Current Facility-Administered Medications:     bisacodyL, 10 mg, Rectal, Daily PRN    hydrALAZINE, 10 mg, Intravenous, Q4H PRN    labetalol, 20 mg, Intravenous, Q4H PRN    sodium chloride 0.9%, 10 mL, Intravenous, PRN     Radiology:  I have personally reviewed the following imaging and agree with the radiologist.     X-Ray Abdomen AP 1 View  EXAMINATION  XR ABDOMEN AP 1 VIEW    CLINICAL HISTORY  residual tube feeds, non absorbing;    TECHNIQUE  A total of 1 AP image(s) of the abdomen.    COMPARISON  10 Sabrina 2025    FINDINGS  Lines/tubes/devices: Enteric tube no longer visualized.  Interval placement of a left upper quadrant percutaneous gastrostomy tube is evident.    There are no interval changes to suggest new or worsening high-grade mechanical bowel obstruction.  No intra-abdominal mass effect is developed.  Detection of air-fluid levels and low-volume pneumoperitoneum is limited due to supine technique. Scattered residual enteric contrast is present through the descending colon.  Excreted contrast also opacifies the bilateral renal collecting system and urinary bladder.    Included lower thoracic cavity and osseous structures are similar in comparison.    IMPRESSION  1. Interval placement of left upper quadrant PEG tube.  2. No definite new/worsened intra-abdominal abnormality.    Electronically signed by: Carlo Badillo  Date:    06/16/2025  Time:    15:55  CTA Head and Neck (xpd)  Narrative: EXAMINATION:  CTA HEAD AND NECK (XPD)    CLINICAL HISTORY:  worsening left sided facial droop;    TECHNIQUE:  Axial images obtained through the cervical region and [place] before and after the administration of intravenous contrast.    Coronal, sagittal, MIP and 3D reconstructions were obtained from the axial data.    Automatic exposure control was utilized to limit radiation dose.    Radiation Dose:    Total DLP: 1494 mGy*cm    COMPARISON:  CT head dated 06/16/2025, CTA head neck dated 06/09/2025, MRI  brain dated 06/09/2025    FINDINGS:  Head CT with contrast:    Similar appearance of left middle cerebral peduncle hemorrhage and residual enhancing left CP angle meningioma.    No enhancing abnormalities.    If present, stenosis of the carotid bulbs is measured based on NASCET criteria,    i.e. area of maximal stenosis compared to the cervical ICA distal to the bulb.    Cervical CTA:    The origins of the great vessels are patent with mild scattered calcifications    The common carotids are patent.  There are mild calcifications the carotid bulbs without hemodynamically stenosis.  The internal carotid are patent.    The vertebral arteries are patent.    Intracranial CTA:    There are calcifications in the course the carotid siphons with mild narrowing of the right supraclinoid segment.  The middle cerebral arteries and anterior cerebral is are patent    The vertebral arteries, basilar artery and posterior cerebral arteries are patent.    The dural venous sinuses are patent.  Impression: No large vessel occlusion or flow-limiting stenosis.  No significant interval change.    Electronically signed by: Maddie Benavides  Date:    06/16/2025  Time:    13:14  CT Head Without Contrast  Narrative: EXAMINATION:  CT HEAD WITHOUT CONTRAST    CLINICAL HISTORY:  Neuro deficit, acute, stroke suspected;Worsening facial droop;    TECHNIQUE:  Multiple axial images were obtained from the base of the brain to the vertex without contrast administration.  Sagittal and coronal reconstructions were performed. .Automatic exposure control  (AEC) is utilized to reduce patient radiation exposure.    COMPARISON:  06/10/2025    FINDINGS:  The patient has undergone previous craniotomy in the left temporal region with encephalomalacia in the left temporal lobe.  This was seen on the prior examination as well.    There is a cerebellopontine angle meningioma seen which was seen on the prior examination from 06/09/2025 as well.  It is better  visualized on the MRI.  There is a persistent hemorrhage seen along the cerebral peduncle on the left side which was seen on the prior examination as well.  There is surrounding cytotoxic edema.  The area of hemorrhage is similar to the prior examination.  No new areas of hemorrhage are seen.    Paranasal sinuses shows a mucous retention cyst in the left maxillary sinus.  Impression: Stable exam with stable area of hemorrhage in the left cerebral peduncle and stable left cerebellopontine angle meningioma which is better visualized on the postcontrast MRI    Stable area of encephalomalacia in the left temporal region with craniotomy in the left temporal region    Mucous retention cyst in the left maxillary sinus    Electronically signed by: Hernando Najera  Date:    06/16/2025  Time:    12:58      Ian Elise MD  Department of Hospital Medicine   [facility]   06/18/2025

## 2025-06-18 NOTE — PT/OT/SLP PROGRESS
Occupational Therapy      Patient Name:  Kaity Cuevas   MRN:  [id number]    Patient not seen in AM secondary to increased BP of 147/90. Nurse recommends PM Tx. Will follow-up as schedule allows.    6/18/2025

## 2025-06-18 NOTE — PT/OT/SLP PROGRESS
Physical Therapy Treatment    Patient Name:  Kaity Cuevas   MRN:  [id number]    Recommendations:     Discharge therapy intensity: High Intensity Therapy   Discharge Equipment Recommendations: to be determined by next level of care  Barriers to discharge: Impaired mobility and Ongoing medical needs    Assessment:     Kaity Cuevas is a 64 y.o. 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  She presents with the following impairments/functional limitations: weakness, impaired endurance, impaired self care skills, impaired functional mobility, gait instability, impaired balance, decreased coordination, impaired coordination .    Rehab Prognosis: Good; patient would benefit from acute skilled PT services to address these deficits and reach maximum level of function.    Recent Surgery: Procedure(s) (LRB):  PEG (N/A) 5 Days Post-Op    Plan:     During this hospitalization, patient would benefit from acute PT services 6 x/week to address the identified rehab impairments via gait training, therapeutic exercises, neuromuscular re-education, therapeutic activities and progress toward the following goals:    Plan of Care Expires:  07/17/25    Subjective     Chief Complaint: light headedness  Patient/Family Comments/goals: to get better and go home  Pain/Comfort:  Pain Rating 1: 0/10      Objective:     Communicated with nsg prior to session.  Patient found supine with peripheral IV, telemetry, PEG Tube upon PT entry to room.     General Precautions: Standard, SBP<160, fall  Orthopedic Precautions: N/A  Braces: N/A  Respiratory Status: Room air  Blood Pressure: 102/73  Skin Integrity: Visible skin intact      Functional Mobility:  Bed Mobility:     Supine to Sit: contact guard assistance  Transfers:     Sit to Stand:  minimum assistance with rolling walker  Gait: Pt ambulated 34 ft + 24ft Ayala with RW; seated rest break in btw  trials. Pt with complaints of lightheadedness and dizziness throughout gait. Minor LOB throughout gait trial. Pt demos step though gait pattern and decreased sonia.     Co-Treatment: No    Education:  Patient and spouse were provided with verbal education education regarding PT role/goals/POC, fall prevention, safety awareness, and discharge/DME recommendations.  Understanding was verbalized.     Patient left up in chair with all lines intact, call button in reach, geomat cushion, and  present    GOALS:   Multidisciplinary Problems       Physical Therapy Goals          Problem: Physical Therapy    Goal Priority Disciplines Outcome Interventions   Physical Therapy Goal     PT, PT/OT Progressing    Description: Goals to be met by: 7/10/2025     Patient will increase functional independence with mobility by performin. Supine to sit with [place]  2. Sit to supine with [place]  3. Sit to stand transfer with [place]  4. Gait  x 100 feet with [place] using No Assistive Device.                          Time Tracking:     PT Received On: 25  PT Start Time: 1347     PT Stop Time: 1416  PT Total Time (min): 29 min     Billable Minutes: Gait Training 2    Treatment Type: Treatment  PT/PTA: PT     Number of PTA visits since last PT visit: 1     2025

## 2025-06-18 NOTE — PT/OT/SLP PROGRESS
Occupational Therapy   Treatment    Name: Kaity Cuevas  MRN: [id number]    Recommendations:     Recommended therapy intensity at discharge: High Intensity Therapy   Discharge Equipment Recommendations:  to be determined by next level of care  Barriers to discharge:       Assessment:     Kaity Cuevas is a 64 y.o. female with a medical diagnosis of <principal problem not specified>.  She presents with good motivation. Performance deficits affecting function are weakness, impaired endurance, impaired self care skills, impaired functional mobility, gait instability, impaired balance, decreased safety awareness, decreased lower extremity function, decreased upper extremity function, impaired coordination.     Rehab Prognosis:  Good; patient would benefit from acute skilled OT services to address these deficits and reach maximum level of function.       Plan:     Patient to be seen 6 x/week to address the above listed problems via self-care/home management, therapeutic activities, therapeutic exercises, neuromuscular re-education  Plan of Care Expires: 07/08/25  Plan of Care Reviewed with: patient    Subjective     Pain/Comfort:  Pain Rating 1: 0/10    Objective:     Communicated with: nurse prior to session.  Patient found up in chair with peripheral IV, telemetry, PEG Tube upon OT entry to room.    General Precautions: Standard, fall, aspiration    Orthopedic Precautions:N/A  Braces: N/A  Respiratory Status: Room air  Vital Signs: Blood Pressure: 110/73     Occupational Performance:     Functional Mobility/Transfers:  Bed mobility:    Sit to Supine: minimum assistance  Transfers: Sit to Stand: minimum assistance with rolling walker  Ambulate in room with RW, with Min A, several instances of loss of balance posteriorly, difficult for patient to self recover    Balance:   Static Sitting Balance: Bilateral UE support: Fair: Patient able to maintain balance with handhold support; may require occasional minimal  assistance.    Therapeutic Positioning    OT interventions performed during the course of today's session in an effort to prevent and/or reduce acquired pressure injuries:   Therapeutic positioning was provided at the conclusion of session to offload all bony prominences for the prevention and/or reduction of pressure injuries    [facility] 6 Click ADL: 18    Co-Treatment: No    Patient Education:  Patient provided with verbal education and demonstrations education regarding fall prevention and safety awareness.  Understanding was verbalized, however additional teaching warranted.      Patient left HOB elevated with all lines intact and call button in reach.    GOALS:   Multidisciplinary Problems       Occupational Therapy Goals          Problem: Occupational Therapy    Goal Priority Disciplines Outcome Interventions   Occupational Therapy Goal     OT, PT/OT Progressing    Description: Goals to be met by: 7/8/25     Patient will increase functional independence with ADLs by performing:    UE Dressing with Modified [place].  LE Dressing with Modified [place].  Grooming while standing with Modified [place].  Toileting from toilet with Modified [place] for hygiene and clothing management.   Toilet transfer to toilet with Modified [place]. Progress from commode as appropriate.                          Time Tracking:     OT Date of Treatment: 06/18/25  OT Start Time: 1652  OT Stop Time: 1715  OT Total Time (min): 23 min    Billable Minutes:Therapeutic Activity 23    Supervising Occupational Therapist: LUKE Santoyo  OT/ELIZABETH: ELIZABETH     Number of ELIZABETH visits since last OT visit: 4    6/18/2025

## 2025-06-18 NOTE — PT/OT/SLP PROGRESS
[facility]  Speech Language Pathology Department  Dysphagia Therapy Progress Note    Patient Name:  Kaity Cuevas   MRN:  [id number]    Recommendations     General recommendations:  dysphagia therapy  Solid texture recommendation:  NPO  Liquid consistency recommendation: NPO   Medications: NPO    Discharge therapy intensity: High Intensity Therapy   Barriers to safe discharge:  acuity of illness and severity of impairment    Subjective     Patient awake and alert. Pt sitting in chair. Pt reports being light headed.   Spiritual/Cultural/Restorationist Beliefs/Practices that affect care: no    Pain/Comfort: Pain Rating 1: 0/10    Objective     Therapeutic PO Trials:  Consistency Amount Fed By Oral Symptoms Pharyngeal Symptoms   Puree x5 SLP Slowed oral transit time Swallow delay 3-5 seconds   Moderately thick liquids 2 by spoon  2 by cup Self Slowed oral transit time Swallow delay 3-5 seconds     Assessment     Pt presenting with reduced swallow delay. Introduced moderately thick liquid trials as patient has sensate response, with no signs/sx at bedside. SLP to continue trials with consideration of repeat MBS.    Outcome Measures     Functional Oral Intake Scale: 1 - Nothing by mouth    Goals     Multidisciplinary Problems       SLP Goals          Problem: SLP    Goal Priority Disciplines Outcome   SLP Goal     SLP Progressing   Description: LTG: The pt will tolerate least restrictive diet with no overt s/sx of aspiration.    STGs:  1. Patient will provide swallow response with delay < 2 seconds.  2. Patient will tolerate puree trials with no signs/sx of aspiration.  3. Patient will participate in repeat MBS.                       Patient Education     Patient and family were provided with verbal education regarding SLP POC.  Understanding was verbalized.    Plan     Will continue to follow and tx as appropriate.    SLP Follow-Up:  Yes   Patient to be seen:  5 x/week   Plan of Care expires:   07/01/25  Plan of Care reviewed with:  patient, spouse       Time Tracking     SLP Treatment Date:   06/18/25  Speech Start Time:  1500  Speech Stop Time:  1516     Speech Total Time (min):  16 min    Billable minutes:  Treatment of Swallow Dysfunction, 16 minutes       06/18/2025

## 2025-06-18 NOTE — PLAN OF CARE
Problem: Adult Inpatient Plan of Care  Goal: Plan of Care Review  Outcome: Progressing  Flowsheets (Taken 6/18/2025 0444)  Plan of Care Reviewed With:   patient   child     Problem: Stroke, Ischemic (Includes Transient Ischemic Attack)  Goal: Improved Sensorimotor Function  Intervention: Optimize Sensory and Perceptual Ability  Flowsheets (Taken 6/18/2025 0444)  Pressure Reduction Techniques:   frequent weight shift encouraged   heels elevated off bed  Pressure Reduction Devices: positioning supports utilized     Problem: Wound  Goal: Skin Health and Integrity  Intervention: Optimize Skin Protection  Flowsheets (Taken 6/18/2025 0444)  Pressure Reduction Techniques:   frequent weight shift encouraged   heels elevated off bed  Pressure Reduction Devices: positioning supports utilized  Activity Management: Rolling - L1  Head of Bed (HOB) Positioning: HOB at 20-30 degrees     Problem: Fall Injury Risk  Goal: Absence of Fall and Fall-Related Injury  Intervention: Promote Injury-Free Environment  Flowsheets (Taken 6/18/2025 0444)  Safety Promotion/Fall Prevention:   assistive device/personal item within reach   bed alarm set   Fall Risk reviewed with patient/family   family to remain at bedside   side rails raised x 3

## 2025-06-19 LAB
ANION GAP SERPL CALC-SCNC: 7 MEQ/L
BASOPHILS # BLD AUTO: 0.01 X10(3)/MCL
BASOPHILS NFR BLD AUTO: 0.1 %
BUN SERPL-MCNC: 24.5 MG/DL (ref 9.8–20.1)
CALCIUM SERPL-MCNC: 8.9 MG/DL (ref 8.4–10.2)
CHLORIDE SERPL-SCNC: 101 MMOL/L (ref 98–107)
CO2 SERPL-SCNC: 29 MMOL/L (ref 23–31)
CREAT SERPL-MCNC: 0.64 MG/DL (ref 0.55–1.02)
CREAT/UREA NIT SERPL: 38
EOSINOPHIL # BLD AUTO: 0.06 X10(3)/MCL (ref 0–0.9)
EOSINOPHIL NFR BLD AUTO: 0.6 %
ERYTHROCYTE [DISTWIDTH] IN BLOOD BY AUTOMATED COUNT: 12.7 % (ref 11.5–17)
GFR SERPLBLD CREATININE-BSD FMLA CKD-EPI: >60 ML/MIN/1.73/M2
GLUCOSE SERPL-MCNC: 141 MG/DL (ref 82–115)
HCT VFR BLD AUTO: 40.5 % (ref 37–47)
HGB BLD-MCNC: 13.5 G/DL (ref 12–16)
IMM GRANULOCYTES # BLD AUTO: 0.16 X10(3)/MCL (ref 0–0.04)
IMM GRANULOCYTES NFR BLD AUTO: 1.6 %
LYMPHOCYTES # BLD AUTO: 1.58 X10(3)/MCL (ref 0.6–4.6)
LYMPHOCYTES NFR BLD AUTO: 15.7 %
MCH RBC QN AUTO: 27.5 PG (ref 27–31)
MCHC RBC AUTO-ENTMCNC: 33.3 G/DL (ref 33–36)
MCV RBC AUTO: 82.5 FL (ref 80–94)
MONOCYTES # BLD AUTO: 1.01 X10(3)/MCL (ref 0.1–1.3)
MONOCYTES NFR BLD AUTO: 10 %
NEUTROPHILS # BLD AUTO: 7.27 X10(3)/MCL (ref 2.1–9.2)
NEUTROPHILS NFR BLD AUTO: 72 %
NRBC BLD AUTO-RTO: 0 %
PLATELET # BLD AUTO: 309 X10(3)/MCL (ref 130–400)
PMV BLD AUTO: 10.3 FL (ref 7.4–10.4)
POTASSIUM SERPL-SCNC: 4.3 MMOL/L (ref 3.5–5.1)
RBC # BLD AUTO: 4.91 X10(6)/MCL (ref 4.2–5.4)
SODIUM SERPL-SCNC: 137 MMOL/L (ref 136–145)
WBC # BLD AUTO: 10.09 X10(3)/MCL (ref 4.5–11.5)

## 2025-06-19 PROCEDURE — 63600175 PHARM REV CODE 636 W HCPCS: Performed by: INTERNAL MEDICINE

## 2025-06-19 PROCEDURE — 25000003 PHARM REV CODE 250: Performed by: INTERNAL MEDICINE

## 2025-06-19 PROCEDURE — 97112 NEUROMUSCULAR REEDUCATION: CPT

## 2025-06-19 PROCEDURE — 21400001 HC TELEMETRY ROOM

## 2025-06-19 PROCEDURE — 97116 GAIT TRAINING THERAPY: CPT

## 2025-06-19 PROCEDURE — 92611 MOTION FLUOROSCOPY/SWALLOW: CPT

## 2025-06-19 PROCEDURE — 92526 ORAL FUNCTION THERAPY: CPT

## 2025-06-19 PROCEDURE — 85025 COMPLETE CBC W/AUTO DIFF WBC: CPT

## 2025-06-19 PROCEDURE — 80048 BASIC METABOLIC PNL TOTAL CA: CPT

## 2025-06-19 PROCEDURE — 63600175 PHARM REV CODE 636 W HCPCS

## 2025-06-19 PROCEDURE — 25000003 PHARM REV CODE 250

## 2025-06-19 PROCEDURE — 36415 COLL VENOUS BLD VENIPUNCTURE: CPT

## 2025-06-19 RX ORDER — DILTIAZEM HYDROCHLORIDE 30 MG/1
30 TABLET, FILM COATED ORAL EVERY 6 HOURS
Status: DISCONTINUED | OUTPATIENT
Start: 2025-06-19 | End: 2025-06-20 | Stop reason: HOSPADM

## 2025-06-19 RX ORDER — ERYTHROMYCIN 5 MG/G
OINTMENT OPHTHALMIC 4 TIMES DAILY
Status: DISCONTINUED | OUTPATIENT
Start: 2025-06-19 | End: 2025-06-20 | Stop reason: HOSPADM

## 2025-06-19 RX ORDER — DEXAMETHASONE 4 MG/1
4 TABLET ORAL EVERY 12 HOURS
Status: DISCONTINUED | OUTPATIENT
Start: 2025-06-19 | End: 2025-06-20 | Stop reason: HOSPADM

## 2025-06-19 RX ADMIN — DILTIAZEM HYDROCHLORIDE 30 MG: 30 TABLET, FILM COATED ORAL at 12:06

## 2025-06-19 RX ADMIN — HYDRALAZINE HYDROCHLORIDE 25 MG: 25 TABLET ORAL at 02:06

## 2025-06-19 RX ADMIN — LACTULOSE 10 G: 10 SOLUTION ORAL at 09:06

## 2025-06-19 RX ADMIN — METOCLOPRAMIDE 10 MG: 10 TABLET ORAL at 05:06

## 2025-06-19 RX ADMIN — PANTOPRAZOLE SODIUM 40 MG: 40 INJECTION, POWDER, FOR SOLUTION INTRAVENOUS at 10:06

## 2025-06-19 RX ADMIN — LOSARTAN POTASSIUM 100 MG: 50 TABLET, FILM COATED ORAL at 10:06

## 2025-06-19 RX ADMIN — CARVEDILOL 12.5 MG: 12.5 TABLET, FILM COATED ORAL at 10:06

## 2025-06-19 RX ADMIN — ATORVASTATIN CALCIUM 40 MG: 40 TABLET, FILM COATED ORAL at 10:06

## 2025-06-19 RX ADMIN — ERYTHROMYCIN: 5 OINTMENT OPHTHALMIC at 09:06

## 2025-06-19 RX ADMIN — METOCLOPRAMIDE 10 MG: 10 TABLET ORAL at 10:06

## 2025-06-19 RX ADMIN — ERYTHROMYCIN: 5 OINTMENT OPHTHALMIC at 04:06

## 2025-06-19 RX ADMIN — METOCLOPRAMIDE 10 MG: 10 TABLET ORAL at 04:06

## 2025-06-19 RX ADMIN — GABAPENTIN 300 MG: 300 CAPSULE ORAL at 09:06

## 2025-06-19 RX ADMIN — DEXAMETHASONE 4 MG: 4 TABLET ORAL at 09:06

## 2025-06-19 RX ADMIN — ERYTHROMYCIN: 5 OINTMENT OPHTHALMIC at 12:06

## 2025-06-19 RX ADMIN — DEXAMETHASONE 4 MG: 4 TABLET ORAL at 10:06

## 2025-06-19 RX ADMIN — GABAPENTIN 300 MG: 300 CAPSULE ORAL at 10:06

## 2025-06-19 RX ADMIN — DILTIAZEM HYDROCHLORIDE 30 MG: 30 TABLET, FILM COATED ORAL at 05:06

## 2025-06-19 RX ADMIN — LACTULOSE 10 G: 10 SOLUTION ORAL at 10:06

## 2025-06-19 RX ADMIN — LACTULOSE 10 G: 10 SOLUTION ORAL at 02:06

## 2025-06-19 RX ADMIN — GABAPENTIN 300 MG: 300 CAPSULE ORAL at 02:06

## 2025-06-19 NOTE — PT/OT/SLP PROGRESS
[facility]   Speech Language Pathology Department  Dysphagia Therapy Progress Note    Patient Name:  Kaity Cuevas   MRN:  [id number]    Recommendations     General recommendations: Repeat Modified Barium Swallow Study  Solid texture recommendation:  NPO  Liquid consistency recommendation: NPO   Medications: via NG tube    Discharge therapy intensity: High Intensity Therapy   Barriers to safe discharge:  acuity of illness    Subjective     Patient awake and alert.  Spiritual/Cultural/Muslim Beliefs/Practices that affect care: no    Pain/Comfort: Pain Rating 1: 0/10    Respiratory Status:  room air    Objective     Oral Musculature  Improvement of L facial droop, however still impaired    Therapeutic PO Trials:  -moderately thick by spoon, cup, and straw: functional, but reduced, oral movement, delay of 2-4 seconds. Slight sensate throat clear with straw, however no other signs/sx of aspiration.     Assessment     Repeat MBS warranted due to improvement of swallow function at bedside.     Outcome Measures     Functional Oral Intake Scale: 1 - Nothing by mouth    Goals     Multidisciplinary Problems       SLP Goals          Problem: SLP    Goal Priority Disciplines Outcome   SLP Goal     SLP Progressing   Description: LTG: The pt will tolerate least restrictive diet with no overt s/sx of aspiration.    STGs:  1. Patient will provide swallow response with delay < 2 seconds.  2. Patient will tolerate puree trials with no signs/sx of aspiration.  3. Patient will participate in repeat MBS.                       Patient Education     Patient and spouse were provided with verbal education regarding SLP POC.  Understanding was verbalized.    Plan     Will continue to follow and tx as appropriate.    SLP Follow-Up:  Yes   Patient to be seen:  5 x/week   Plan of Care expires:  07/01/25  Plan of Care reviewed with:  patient, spouse       Time Tracking     SLP Treatment Date:   06/19/25  Speech  Start Time:  0925  Speech Stop Time:  0938     Speech Total Time (min):  13 min    Billable minutes:  Treatment of Swallow Dysfunction, 13 minutes       06/19/2025

## 2025-06-19 NOTE — PROGRESS NOTES
[facility] MEDICINE ~ PROGRESS NOTE        CHIEF COMPLAINT   Hospital follow up    HOSPITAL COURSE   64 year old with a history that includes meningioma, s/p resection 2020 and radiation x 2 (2009 and 2021), presented to ED 6/9 with worsening left sided weakness.  CT head non-specific findings in the right frontal lobe suspicious for acute infarct; in addition noting interval enlargement of left CP meningioma since 07/18/2024.   Patient was admitted to  services; Neurology and Neurosurgery.  Subsequent MRI ruled out acute CVA, noting residual meningioma at the cerebellopontine angle, larger than the prior examination and with associated surrounding edema. Patient started on IV decadron.  Neurosurgery on board.  Neurology signed off.       Patient failed swallow. Will consult GI for PEG tube. Continue steroid taper as per neurosurgery. S/p PEG tube on 6/13.    Today  Seen and examined this morning.  Has a left facial droop but good strength in extremities.  Awaiting rehab.  There was some issue about her not tolerating tube feeds, checking with nurse.        OBJECTIVE/PHYSICAL EXAM     VITAL SIGNS (MOST RECENT):  Temp: 98.2 °F (36.8 °C) (06/19/25 0709)  Pulse: 81 (06/19/25 0709)  Resp: 19 (06/19/25 0400)  BP: 118/72 (06/19/25 0709)  SpO2: 98 % (06/19/25 0709) VITAL SIGNS (24 HOUR RANGE):  Temp:  [97.3 °F (36.3 °C)-98.2 °F (36.8 °C)] 98.2 °F (36.8 °C)  Pulse:  [81-91] 81  Resp:  [16-19] 19  SpO2:  [97 %-98 %] 98 %  BP: ()/(50-78) 118/72   GENERAL: In no acute distress, afebrile  HEENT:  CHEST: Clear to auscultation bilaterally  HEART: S1, S2, no appreciable murmur  ABDOMEN: Soft, nontender, BS +  MSK: Warm, no lower extremity edema, no clubbing or cyanosis  NEUROLOGIC: Alert and oriented x4, moving all extremities with good strength, left facial droop   INTEGUMENTARY:  PSYCHIATRY:        ASSESSMENT/PLAN   Residual meningioma at the cerebellopontine angle appears larger than  the prior examination and there is some increased surrounding edema.  Old area of focal hemorrhage or calcification in the posterior cerebral convexity on the right side  Hx of meningioma, s/p resection 2020 and radiation x 2 (2009 and 2021)  HTN  Dysphagia 2/2 meningioma  Steroid induced leukocytosis      Neurosurgery signed off.  6 week follow up with imaging.  Recommend dexamethasone 4 mg b.i.d. for week and then 2 mg b.i.d. until she follows up.  Change extended-release diltiazem 2 instant release given PEG tube.  Continue PT OT ST  Awaiting placement    DVT prophylaxis:  Not safe at this time    Anticipated discharge and disposition:   __________________________________________________________________________    NUTRITIONAL STATUS     Patient meets ASPEN criteria for severe malnutrition of acute illness or injury per RD assessment as evidenced by:  Energy Intake (Malnutrition): less than or equal to 50% for greater than or equal to 5 days  Weight Loss (Malnutrition): other (see comments) (Unable to assess)  Subcutaneous Fat (Malnutrition): mild depletion  Muscle Mass (Malnutrition): moderate depletion  Fluid Accumulation (Malnutrition): other (see comments) (Not present)        A minimum of two characteristics is recommended for diagnosis of either severe or non-severe malnutrition.     LABS/MICRO/MEDS/DIAGNOSTICS       LABS  Recent Labs     06/19/25  0312      K 4.3   CO2 29   BUN 24.5*   CREATININE 0.64   CALCIUM 8.9     Recent Labs     06/19/25  0312   WBC 10.09   RBC 4.91   HCT 40.5   MCV 82.5          MICROBIOLOGY  Microbiology Results (last 7 days)       ** No results found for the last 168 hours. **               MEDICATIONS   atorvastatin  40 mg Per G Tube Daily    carvediloL  12.5 mg Per G Tube BID    dexAMETHasone (Decadron) IV (PEDS and ADULTS)  4 mg Intravenous Q12H    diltiaZEM  180 mg Oral Daily    erythromycin   Left Eye QID    gabapentin  300 mg Per G Tube TID    hydrALAZINE  25 mg  Per G Tube Q8H    lactulose 10 gram/15 ml  10 g Per G Tube TID    losartan  100 mg Per G Tube Daily    metoclopramide HCl  10 mg Per G Tube TID AC    pantoprazole  40 mg Intravenous Daily    polyethylene glycol  17 g Per G Tube Daily         INFUSIONS         DIAGNOSTIC TESTS  X-Ray Abdomen AP 1 View   Final Result      CT Head Without Contrast   Final Result      Stable exam with stable area of hemorrhage in the left cerebral peduncle and stable left cerebellopontine angle meningioma which is better visualized on the postcontrast MRI      Stable area of encephalomalacia in the left temporal region with craniotomy in the left temporal region      Mucous retention cyst in the left maxillary sinus         Electronically signed by: Hernando Najera   Date:    06/16/2025   Time:    12:58      CTA Head and Neck (xpd)   Final Result      No large vessel occlusion or flow-limiting stenosis.  No significant interval change.         Electronically signed by: Maddie Benavides   Date:    06/16/2025   Time:    13:14      XR Gastric tube check, non-radiologist performed   Final Result      As above.         Electronically signed by: Dustin Peña   Date:    06/10/2025   Time:    16:33      Fl Modified Barium Swallow Speech   Final Result      CT Head Without Contrast   Final Result      Stable exam without significant interval change.         Electronically signed by: Maddie Benavides   Date:    06/10/2025   Time:    10:37      MRI Brain W WO Contrast   Final Result      Residual meningioma at the cerebellopontine angle appears larger than the prior examination and there is some increased surrounding edema.      Postsurgical changes in the left temporal fossa and left posterior fossa stable meningioma the centrum semiovale on the right side      Old area of focal hemorrhage or calcification in the posterior cerebral convexity on the right side relatively stable since the prior examination         Electronically signed by: Hernando  Gabrielaleja   Date:    06/09/2025   Time:    14:12      CTA Head and Neck (xpd)   Final Result      1. No flow-limiting stenosis or large vessel occlusion within the intracranial arterial vasculature.   2. No flow-limiting stenosis within the cervical arterial vasculature.   3. Patent dural venous sinuses.         Electronically signed by: Bernardo oRy MD   Date:    06/09/2025   Time:    12:19      CT Head Without Contrast   Final Result      1. Loss of gray-white differentiation at the right frontal lobe (best seen image 30, series 2).  Findings suspicious for acute infarct.  No evidence of hemorrhagic transformation.   2. No evidence of intraparenchymal hemorrhage.   3. Findings most consistent with interval enlargement of left CP angle mass since 07/18/2024.  Findings on MRI from 07/19/2024 suggested underlying meningioma.         Electronically signed by: Bernardo Roy MD   Date:    06/09/2025   Time:    11:52           No echocardiogram results found for the past 14 days.         Case related differential diagnoses have been reviewed; assessment and plan has been documented. I have personally reviewed the labs and test results that are currently available; I have reviewed the patients medication list. I have reviewed the consulting providers recommendations. I have reviewed or attempted to review medical records based upon their availability.  All of the patient's and/or family's questions have been addressed and answered to the best of my ability.  I will continue to monitor closely and make adjustments to medical management as needed.  This document was created using [employer]*Modal Fluency Direct.  Transcription errors may have been made.  Please contact me if any questions may rise regarding documentation to clarify transcription.        Zane Hoskins MD   Internal Medicine  Department of Hospital Medicine  [facility] - Neurology

## 2025-06-19 NOTE — PT/OT/SLP PROGRESS
Physical Therapy Treatment    Patient Name:  Kaity Cuevas   MRN:  [id number]    Recommendations:     Discharge therapy intensity: High Intensity Therapy   Discharge Equipment Recommendations: to be determined by next level of care  Barriers to discharge: Impaired mobility and Ongoing medical needs    Assessment:     Kaity Cuevas is a 64 y.o. 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  She presents with the following impairments/functional limitations: weakness, impaired endurance, impaired self care skills, impaired functional mobility, gait instability, impaired balance, decreased coordination, impaired coordination.    Rehab Prognosis: Good; patient would benefit from acute skilled PT services to address these deficits and reach maximum level of function.    Recent Surgery: Procedure(s) (LRB):  PEG (N/A) 6 Days Post-Op    Plan:     During this hospitalization, patient would benefit from acute PT services 6 x/week to address the identified rehab impairments via gait training, therapeutic exercises, neuromuscular re-education, therapeutic activities and progress toward the following goals:    Plan of Care Expires:  07/17/25    Subjective     Chief Complaint: NA  Patient/Family Comments/goals: get better  Pain/Comfort:  Pain Rating 1: 0/10      Objective:     Communicated with nsg prior to session.  Patient found supine with PEG Tube, peripheral IV, telemetry, PureWick upon PT entry to room.     General Precautions: Standard, fall, aspiration  Orthopedic Precautions: N/A  Braces: N/A  Respiratory Status: Room air  Blood Pressure: 130/76  Skin Integrity: Visible skin intact      Functional Mobility:  Bed Mobility:     Supine to Sit: contact guard assistance  Transfers:     Sit to Stand:  minimum assistance with rolling walker  Gait: Pt ambulated 80ft Ayala with RW. Gait improvement since yesterday; demos intermittent  step to and step through gait. Minor LOB and sonia increased but still slower than norm.     Therapeutic Activity  Standing Balance on foam block: Ayala with reaching activity x10 BUE. Minor LOB throughout and posterior lean, VC and TC to correct; pt reports she can't always feel herself leaning back   Step ups on foam block: Ayala x10 BLE    Co-Treatment: No    Education:  Patient and spouse were provided with verbal education education regarding PT role/goals/POC, fall prevention, safety awareness, and discharge/DME recommendations.  Understanding was verbalized.     Patient left up in chair with all lines intact, call button in reach, geomat cushion, and  present    GOALS:   Multidisciplinary Problems       Physical Therapy Goals          Problem: Physical Therapy    Goal Priority Disciplines Outcome Interventions   Physical Therapy Goal     PT, PT/OT Progressing    Description: Goals to be met by: 7/10/2025     Patient will increase functional independence with mobility by performin. Supine to sit with Crapo  2. Sit to supine with Crapo  3. Sit to stand transfer with Crapo  4. Gait  x 100 feet with Crapo using No Assistive Device.                          Time Tracking:     PT Received On: 25  PT Start Time: 1109     PT Stop Time: 1132  PT Total Time (min): 23 min     Billable Minutes: Gait Training 1 and Neuromuscular Re-education 1    Treatment Type: Treatment  PT/PTA: PT     Number of PTA visits since last PT visit: 2     2025

## 2025-06-19 NOTE — PROCEDURES
[facility]  Speech Language Pathology Department  Modified Barium Swallow (MBS) Study    Patient Name:  Kaity Cuevas   MRN:  [id number]    Recommendations     General recommendations:  SLP follow up regarding diet tolerance and dysphagia therapy  Solid texture recommendation:  Minced & Moist Diet - IDDSI Level 5  Liquid consistency recommendation: Mildly thick liquids - IDDSI Level 2   Medications: crushed in puree  Swallow strategies/precautions: small bites/sips, slow rate, upright for PO intake, and assist with feeding as needed  General precautions: aspiration    History     Kaity Cuevas is a/n 64 y.o. 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 Symptoms improving.    Past Medical History:   Diagnosis Date    Benign meningioma     Carpal tunnel syndrome on right     Cataract     Cervical disc displacement     Cervical spondylosis     Hard of hearing     Hyperlipidemia     Hypertension     Neoplasm, brain     Nephrolithiasis     Neurotrophic keratoconjunctivitis     Osteoporosis      Past Surgical History:   Procedure Laterality Date    BRAIN SURGERY  06/04/2020    CARPAL TUNNEL RELEASE Right 06/07/2024    Procedure: RELEASE, CARPAL TUNNEL;  Surgeon: Yuriy Stark MD;  Location: 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     Home diet texture/consistency: Regular and thin liquids  Current Method of Nutrition: Tube feeding (PEG)      Imaging   Results for orders placed during the hospital encounter of 08/06/23    X-Ray Chest 1 View    Narrative  EXAMINATION:  XR CHEST 1 VIEW    CLINICAL HISTORY:  r/o bleeding or hemorrhage;    TECHNIQUE:  One view    COMPARISON:  CT chest same date    FINDINGS:  Cardiopericardial silhouette is within normal limits. Lungs are without dense focal or segmental consolidation, congestive process, pleural effusions or pneumothorax.    There appears some sclerosis of the left humeral head.    Impression  NO ACUTE CARDIOPULMONARY PROCESS IDENTIFIED.      Electronically signed by: Chago Plummer  Date:    08/06/2023  Time:    20:56    No results found for this or any previous visit.    No results found for this or any previous visit.    Subjective     Patient awake and alert.    Spiritual/Cultural/Voodoo Beliefs/Practices that affect care: no  Pain/Comfort: Pain Rating 1: 0/10    Respiratory Status:  room air    Restraints/positioning devices: none    Fluoroscopic Findings     Oral Musculature  Dentition: own teeth  Secretion Management: adequate  Mucosal Quality: good  Facial Movement: reduced left      Setup  Upright in bed  Able to self feed  Adequate head control    Visualization  Lateral view    Oral Phase:   Prolonged mastication  Loss of bolus control to pyriform sinuses    Pharyngeal Phase:   Swallow delay with spill to the pyriforms  Adequate base of tongue retraction  Reduced epiglottic deflection  Adequate hyolaryngeal  excursion  adequate laryngeal vestibule closure    Consistency Fed by Laryngeal Penetration Aspiration Residue   Moderately thick liquid by spoon SLP None None None   Moderately thick liquid by cup SLP None None None   Puree SLP None None None   Mildly thick liquid by cup Self None None None   Thin liquid by cup Self Before the swallow  Cleared with swallow None None   Thin liquid by straw Self During the swallow  Cleared with swallow None None   Mildly thick liquid by straw Self None None None   Regular solid SLP None None None     Cervical Esophageal Phase:   UES appeared to accommodate all bolus types without stasis or retrograde movement visualized    Assessment     Oropharyngeal swallow significantly improved in function and safety. Continues to present with swallow delay, however with reduced time. Patient remains at higher risk of aspiration, however is appropriate for modified diet. SLP rec: minced and moist solids, mildly thick liquids, meds crushed in puree.       Outcome Measures     Functional Oral Intake Scale: 5 - Total oral diet with multiple consistencies, by requiring special preparation or compensations    Goals     Multidisciplinary Problems       SLP Goals          Problem: SLP    Goal Priority Disciplines Outcome   SLP Goal     SLP Progressing   Description: LTG: The pt will tolerate least restrictive diet with no overt s/sx of aspiration.    STGs:  1. Patient will provide swallow response with delay < 2 seconds.  2. Patient will tolerate puree trials with no signs/sx of aspiration.- completed  3. Patient will participate in repeat MBS.- complete                       Education     Patient provided with verbal education regarding results/recommendations.  Understanding was verbalized.    Plan     SLP Follow-Up:  Yes    Patient to be seen:  5 x/week   Plan of Care expires:  07/03/25  Plan of Care reviewed with:  patient     Time Tracking     SLP Treatment Date:   06/19/25  Speech Start Time:   1330  Speech Stop Time:  1345     Speech Total Time (min):  15 min    Billable minutes:   Motion Fluoroscopic Evaluation, Video Recording, 15 minutes     06/19/2025

## 2025-06-19 NOTE — PROGRESS NOTES
Inpatient Nutrition Assessment    Admit Date: 6/9/2025   Total duration of encounter: 10 days   Patient Age: 64 y.o.    Nutrition Recommendation/Prescription     Bolus tube feedings for today and only as tolerated.   80mL Isosource HN q4hr + 30mL water flush before and after will provide:   480kcals, 30% est needs  22g protein, 33% est needs  624mL fl/d, 46% est needs. Recommend 50mL q2hr water flush to provide a total of 1124mL fl/d.   *Bolus tube feedings on pump over 1hr, not manually    Continue GI motility agent, bowel regimen and protonix.     Goal bolus tube feeding recommendations once able to tolerate increase in tube feeding volume.  250mL Isosource HN 4 daily or a total of 1000mL Isosource HN spread throughout the day will provide  1200kcals/d (95% est needs)  54g protein/d (79% est needs)  808mL fl/d (60% est needs)    Continue NPO until cleared for oral diet. Texture modifications per SLP.     Communication of Recommendations: reviewed with provider, reviewed with nurse, reviewed with patient, and reviewed with family    Nutrition Assessment     Malnutrition Assessment/Nutrition-Focused Physical Exam    Malnutrition Context: acute illness or injury (06/17/25 1308)  Malnutrition Level: severe (06/17/25 1308)  Energy Intake (Malnutrition): less than or equal to 50% for greater than or equal to 5 days (06/17/25 1308)  Weight Loss (Malnutrition): other (see comments) (Unable to assess) (06/17/25 1308)  Subcutaneous Fat (Malnutrition): mild depletion (06/17/25 1308)  Orbital Region (Subcutaneous Fat Loss): mild depletion  Upper Arm Region (Subcutaneous Fat Loss): mild depletion     Muscle Mass (Malnutrition): moderate depletion (06/17/25 1308)  [place] Region (Muscle Loss): moderate depletion  Clavicle Bone Region (Muscle Loss): moderate depletion                    Fluid Accumulation (Malnutrition): other (see comments) (Not present) (06/17/25 1308)        A minimum of two characteristics is recommended for  diagnosis of either severe or non-severe malnutrition.    Chart Review    Reason Seen: malnutrition screening tool (MST) and follow-up    Malnutrition Screening Tool Results   Have you recently lost weight without trying?: Unsure  Have you been eating poorly because of a decreased appetite?: Yes   MST Score: 3   Diagnosis:  Worsening left facial droop, left-sided weakness  History of benign meningioma resected in 2020 followed by Neurosurgery with serial MRI  --with chronic left facial deficits as well as left sided weakness  HLD  Hypertension    Relevant Medical History: Benign meningioma, Carpal tunnel syndrome on right, Cataract, Cervical disc displacement, Cervical spondylosis, Hard of hearing, Hyperlipidemia, Hypertension, Neoplasm, brain, Nephrolithiasis, Neurotrophic keratoconjunctivitis, and Osteoporosis     Scheduled Medications:  atorvastatin, 40 mg, Daily  carvediloL, 12.5 mg, BID  dexAMETHasone, 4 mg, Q12H  diltiaZEM, 30 mg, Q6H  erythromycin, , QID  gabapentin, 300 mg, TID  hydrALAZINE, 25 mg, Q8H  lactulose 10 gram/15 ml, 10 g, TID  losartan, 100 mg, Daily  metoclopramide HCl, 10 mg, TID AC  pantoprazole, 40 mg, Daily  polyethylene glycol, 17 g, Daily    Continuous Infusions:   PRN Medications:  bisacodyL, 10 mg, Daily PRN  hydrALAZINE, 10 mg, Q4H PRN  labetalol, 20 mg, Q4H PRN  sodium chloride 0.9%, 10 mL, PRN    Calorie Containing IV Medications: no significant kcals from medications at this time    Recent Labs   Lab 06/13/25  0554 06/14/25  0344 06/15/25  1056 06/16/25  0830 06/19/25  0312    141  --  139 137   K 4.0 4.3  --  3.9 4.3   CALCIUM 8.9 8.1*  --  9.0 8.9   MG  --   --   --  2.10  --     107  --  104 101   CO2 29 27  --  29 29   BUN 29.1* 29.4*  --  32.7* 24.5*   CREATININE 0.71 0.65  --  0.60 0.64   EGFRNORACEVR >60 >60  --  >60 >60   * 133*  --  138* 141*   WBC 12.02* 10.04 8.46 9.11 10.09   HGB 13.7 13.2 14.0 13.1 13.5   HCT 41.9 40.4 43.6 40.9 40.5     Nutrition  "Orders:  Diet NPO  Tube Feedings/Formulas 50; Isosource HN; Peg; 30; Other (see comments)  Appetite/Oral Intake: not applicable/NPO  Factors Affecting Nutritional Intake: chewing difficulty, difficulty/impaired swallowing, and NPO  Social Needs Impacting Access to Food: none identified  Food/Congregational/Cultural Preferences: vanilla or strawberry oral supplement   Food Allergies: none reported  Last Bowel Movement: 06/18/25  Wound(s):      Comments    6/10/25 Reports poor appetite since 6/6/25, with nausea, vomiting and increased difficultly chewing/swallowing food. She did have a fair appetite prior to this, oral intake was inconsistent. Left side deficit from resected meningioma sx in 2020, would use right side of mouth for chewing without difficultly up until this past weekend. Family reports UBW ~130lbs in 2020 and has slowly lost unintentional weight. Thinks weight stable ~110lbs x1 year. Admit weight of 99lbs, unsure time frame of 10% weight loss. Will drink vanilla or strawberry ONS once diet resumed. Tube feeding recommendation above if unable to resume oral diet.     6/11/25 Tolerating tube feedings at 35mL/hr this morning. Denies any GI complaints.     6/13/25 PEG placement today. Was tolerating tube feedings at goal. Last BM 6/9, receiving stool softeners.     6/17/25 Tube feedings have been held many times since PEG placed last week. Currently on hold s/t "sour" stomach and reflux/belching. No nausea or vomiting reports. Receiving laxatives, stool softeners and enemas x2 days to promote bowel movements. Had one yesterday and a good one this morning. Plans to resume tube feedings. Will trial incremental advancement of bolus feeds (4-5 feeds daily) to allow rest time from pump. May need additional free water flushes until goal bolus tube feedings achieved.      6/19/25 Tube feedings held yesterday morning s/t >200mL residuals pulled in AM. Discussed with MD, Reglan was added. Has had >4 good bowel movements " "since . Reports of patient receiving some continuous feedings overnight? Discussed small volume bolus tube feedings with today's nurse. Will trial 80mL bolus q4hr as tolerated all today and increase volume tomorrow if tolerated. Discussed this with patient and family at bedside. No nausea or bowel movement this morning.  Addendum 15:33: spoke to RN, has tolerated 2- 80mL tube feeding boluses so far today. Going down for a MBS.     Anthropometrics    Height: 5' 2" (157.5 cm),    Last Weight: 45.3 kg (99 lb 13.9 oz) (25 1017), Weight Method: Standard Scale   BMI 18.27kg/m^2  BMI Classification: underweight (BMI less than 18.5)     Ideal Body Weight (IBW), Female: 110 lb                          Usual Body Weight (UBW), k kg  % Usual Body Weight: 90.79     Usual Weight Provided By: patient and family/caregiver    Wt Readings from Last 5 Encounters:   25 45.3 kg (99 lb 13.9 oz)   24 47.3 kg (104 lb 3.2 oz)   24 49.8 kg (109 lb 12.6 oz)   24 49.9 kg (110 lb 0.2 oz)   23 49.9 kg (110 lb)     Weight Change(s) Since Admission:   25 no updated weights   Wt Readings from Last 1 Encounters:   25 1017 45.3 kg (99 lb 13.9 oz)   Admit Weight: 45.3 kg (99 lb 13.9 oz) (25 1017), Weight Method: Standard Scale    Estimated Needs    Weight Used For Calorie Calculations: 45.3 kg (99 lb 13.9 oz)  Energy Calorie Requirements (kcal): 1585-1812kacls/d (35-40kcals/kg) for weight gain  Energy Need Method: Kcal/kg  Weight Used For Protein Calculations: 45.3 kg (99 lb 13.9 oz)  Protein Requirements: 68g/d (1.5g/kg)   Fluid Requirements (mL): 1359mL fl/d (30ml/kg)        Enteral Nutrition    Formula: Isosource HN  Rate/Volume: 80mL q4hr (~5 boluses daily)  Water Flushes: 60mL q4hr   Additives/Modulars: none at this time  Route: PEG tube  Method: continuous  Total Nutrition Provided by Tube Feeding, Additives, and Flushes:  Calories Provided   480kcal/d, 30% needs   Protein Provided   " 22g/d, 33% needs   Fluid Provided   624ml/d, 46% needs   Continuous feeding calculations based on estimated 20 hr/d run time unless otherwise stated.    Parenteral Nutrition     Patient not receiving parenteral nutrition support at this time.    Evaluation of Received Nutrient Intake    Calories: not meeting estimated needs  Protein: not meeting estimated needs    Patient Education     Not applicable.    Nutrition Diagnosis     PES: Inadequate energy intake related to inability to consume sufficient nutrients as evidenced by likely <50% EER since 6/6/25. (active)     PES: Severe acute disease or injury related malnutrition Related to inability to consume sufficient nutrients  As Evidenced by:  - weight loss: Unable to assess - energy intake: <= 50% for 5 days (meets criteria for <= 50% >= 5 days (severe - acute)) - muscle mass depletion: 2 areas of moderate muscle loss (Temporalis, Clavicle) - loss of subcutaneous fat: 3 areas of mild fat loss (Buccal, Infraorbital, Triceps Skinfold) active    Nutrition Interventions     Intervention(s): modified rate of enteral nutrition, prescription medication, and collaboration with other providers  Intervention(s): Enteral nutrition    Goal: Meet greater than 80% of nutritional needs by follow-up. (goal progressing)  Goal: Tolerate enteral nutrition infusion at goal by follow-up. (New)    Nutrition Goals & Monitoring     Dietitian will monitor: energy intake, enteral nutrition intake, weight change, electrolyte/renal panel, beliefs/attitudes, glucose/endocrine profile, and gastrointestinal profile  Discharge planning: tube feeding (Isosource HN/Osmolite 1.2 or equivalent, goal 1000mL daily)  Nutrition Risk/Follow-Up: patient at increased nutrition risk; dietitian will follow-up twice weekly   Please consult if re-assessment needed sooner.

## 2025-06-20 VITALS
HEART RATE: 111 BPM | SYSTOLIC BLOOD PRESSURE: 133 MMHG | HEIGHT: 62 IN | BODY MASS INDEX: 18.38 KG/M2 | WEIGHT: 99.88 LBS | TEMPERATURE: 98 F | OXYGEN SATURATION: 97 % | RESPIRATION RATE: 16 BRPM | DIASTOLIC BLOOD PRESSURE: 86 MMHG

## 2025-06-20 PROCEDURE — 63600175 PHARM REV CODE 636 W HCPCS: Performed by: INTERNAL MEDICINE

## 2025-06-20 PROCEDURE — 97530 THERAPEUTIC ACTIVITIES: CPT | Mod: CO

## 2025-06-20 PROCEDURE — 97116 GAIT TRAINING THERAPY: CPT | Mod: CQ

## 2025-06-20 PROCEDURE — 63600175 PHARM REV CODE 636 W HCPCS

## 2025-06-20 PROCEDURE — 25000003 PHARM REV CODE 250: Performed by: INTERNAL MEDICINE

## 2025-06-20 PROCEDURE — 25000003 PHARM REV CODE 250

## 2025-06-20 PROCEDURE — 97530 THERAPEUTIC ACTIVITIES: CPT | Mod: CQ

## 2025-06-20 RX ORDER — CARVEDILOL 12.5 MG/1
12.5 TABLET ORAL 2 TIMES DAILY
Qty: 60 TABLET | Refills: 0 | Status: SHIPPED | OUTPATIENT
Start: 2025-06-20 | End: 2025-07-20

## 2025-06-20 RX ORDER — DEXAMETHASONE 2 MG/1
4 TABLET ORAL EVERY 12 HOURS
Qty: 12 TABLET | Refills: 0 | Status: SHIPPED | OUTPATIENT
Start: 2025-06-20 | End: 2025-06-23

## 2025-06-20 RX ADMIN — PANTOPRAZOLE SODIUM 40 MG: 40 INJECTION, POWDER, FOR SOLUTION INTRAVENOUS at 09:06

## 2025-06-20 RX ADMIN — GABAPENTIN 300 MG: 300 CAPSULE ORAL at 09:06

## 2025-06-20 RX ADMIN — METOCLOPRAMIDE 10 MG: 10 TABLET ORAL at 09:06

## 2025-06-20 RX ADMIN — LACTULOSE 10 G: 10 SOLUTION ORAL at 09:06

## 2025-06-20 RX ADMIN — DILTIAZEM HYDROCHLORIDE 30 MG: 30 TABLET, FILM COATED ORAL at 12:06

## 2025-06-20 RX ADMIN — ERYTHROMYCIN: 5 OINTMENT OPHTHALMIC at 09:06

## 2025-06-20 RX ADMIN — DILTIAZEM HYDROCHLORIDE 30 MG: 30 TABLET, FILM COATED ORAL at 06:06

## 2025-06-20 RX ADMIN — ATORVASTATIN CALCIUM 40 MG: 40 TABLET, FILM COATED ORAL at 09:06

## 2025-06-20 RX ADMIN — DEXAMETHASONE 4 MG: 4 TABLET ORAL at 09:06

## 2025-06-20 RX ADMIN — LOSARTAN POTASSIUM 100 MG: 50 TABLET, FILM COATED ORAL at 09:06

## 2025-06-20 RX ADMIN — METOCLOPRAMIDE 10 MG: 10 TABLET ORAL at 06:06

## 2025-06-20 RX ADMIN — POLYETHYLENE GLYCOL 3350 17 G: 17 POWDER, FOR SOLUTION ORAL at 09:06

## 2025-06-20 RX ADMIN — CARVEDILOL 12.5 MG: 12.5 TABLET, FILM COATED ORAL at 09:06

## 2025-06-20 NOTE — PT/OT/SLP PROGRESS
"Physical Therapy Treatment    Patient Name:  Kaity Cuevas   MRN:  [id number]    Recommendations:     Discharge therapy intensity: High Intensity Therapy   Discharge Equipment Recommendations: to be determined by next level of care  Barriers to discharge: Impaired mobility    Assessment:     Kaity Cuevas is a 64 y.o. 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  She presents with the following impairments/functional limitations: weakness, impaired endurance, impaired self care skills, impaired functional mobility, gait instability, impaired balance, decreased coordination, impaired coordination .    Rehab Prognosis: Good; patient would benefit from acute skilled PT services to address these deficits and reach maximum level of function.    Recent Surgery: Procedure(s) (LRB):  PEG (N/A) 7 Days Post-Op    Plan:     During this hospitalization, patient would benefit from acute PT services 6 x/week to address the identified rehab impairments via gait training, therapeutic exercises, neuromuscular re-education, therapeutic activities and progress toward the following goals:    Plan of Care Expires:  07/17/25    Subjective     Chief Complaint: none  Patient/Family Comments/goals: "I'm determined to be independent."  Pain/Comfort:  Pain Rating 1: 0/10      Objective:     Communicated with pts nurse prior to session.  Patient found HOB elevated with PEG Tube, peripheral IV, PureWick, telemetry upon PT entry to room.     General Precautions: Standard, aspiration  Orthopedic Precautions: N/A  Braces: N/A  Respiratory Status: Room air  Blood Pressure: 133/86  Skin Integrity: Visible skin intact      Functional Mobility:  Bed Mobility:     Rolling Left:  contact guard assistance  Supine to Sit: contact guard assistance and minimum assistance  Transfers:     Sit to Stand:  minimum assistance with rolling walker and assistance needed " primarily to bring COM over TONE  Bed to Chair: minimum assistance with  rolling walker  using  Step Transfer  Gait: the pt ambulated 100 ft x 2 w/ RW, 5 min rest b/t walks.  Pt demonstrates tendency to to step to the right of walker, decrease trunk control and LOB to L. Pt given visual cues for L foot placement, improving balance and quality of gait.    Balance: Pt requires Min to Mod A for obtaining balance coming to stand due to tendency to lean L and posteriorly and Min to tactile cues as pt progresses w/ activities    Co-Treatment: No    Education:  Patient provided with verbal education and demonstrations education regarding fall prevention, safety awareness, and balance and gait.  Understanding was verbalized, however additional teaching warranted.     Patient left up in chair with all lines intact, call button in reach, geomat cushion, and nurse notified    GOALS:   Multidisciplinary Problems       Physical Therapy Goals          Problem: Physical Therapy    Goal Priority Disciplines Outcome Interventions   Physical Therapy Goal     PT, PT/OT Progressing    Description: Goals to be met by: 7/10/2025     Patient will increase functional independence with mobility by performin. Supine to sit with [place]  2. Sit to supine with [place]  3. Sit to stand transfer with [place]  4. Gait  x 100 feet with [place] using No Assistive Device.                          Time Tracking:     PT Received On: 25  PT Start Time: 1046     PT Stop Time: 1114  PT Total Time (min): 28 min     Billable Minutes: Gait Training 13 min and Therapeutic Activity 14 min    Treatment Type: Treatment  PT/PTA: PTA     Number of PTA visits since last PT visit: 3     2025

## 2025-06-20 NOTE — PROGRESS NOTES
Inpatient Nutrition Assessment    Admit Date: 6/9/2025   Total duration of encounter: 11 days   Patient Age: 64 y.o.    Nutrition Recommendation/Prescription     Oral diet (minced and moist solids + moderately thick liquids) as recommended by SLP.   Encouraged small and frequent meals.   Trial Boost Very High Calorie (provides 530 kcal, 22 g protein per serving) BID for additional nutrition.  -Add 1 packet of moderately thick thickener per container for moderately thick liquid.  Continue GI motility agent, bowel regimen and protonix.   Monitor need to add supplemental tube feedings or water flushes via PEG.    Communication of Recommendations: reviewed with nurse, reviewed with patient, and reviewed with family    Nutrition Assessment     Malnutrition Assessment/Nutrition-Focused Physical Exam    Malnutrition Context: acute illness or injury (06/17/25 1308)  Malnutrition Level: severe (06/17/25 1308)  Energy Intake (Malnutrition): less than or equal to 50% for greater than or equal to 5 days (06/17/25 1308)  Weight Loss (Malnutrition): other (see comments) (Unable to assess) (06/17/25 1308)  Subcutaneous Fat (Malnutrition): mild depletion (06/17/25 1308)  Orbital Region (Subcutaneous Fat Loss): mild depletion  Upper Arm Region (Subcutaneous Fat Loss): mild depletion     Muscle Mass (Malnutrition): moderate depletion (06/17/25 1308)  [place] Region (Muscle Loss): moderate depletion  Clavicle Bone Region (Muscle Loss): moderate depletion                    Fluid Accumulation (Malnutrition): other (see comments) (Not present) (06/17/25 1308)        A minimum of two characteristics is recommended for diagnosis of either severe or non-severe malnutrition.    Chart Review    Reason Seen: malnutrition screening tool (MST) and follow-up    Malnutrition Screening Tool Results   Have you recently lost weight without trying?: Unsure  Have you been eating poorly because of a decreased appetite?: Yes   MST Score: 3    Diagnosis:  Worsening left facial droop, left-sided weakness  History of benign meningioma resected in 2020 followed by Neurosurgery with serial MRI  --with chronic left facial deficits as well as left sided weakness  Dysphagia 2/2 meningioma   HLD  Hypertension    Relevant Medical History: Benign meningioma, Carpal tunnel syndrome on right, Cataract, Cervical disc displacement, Cervical spondylosis, Hard of hearing, Hyperlipidemia, Hypertension, Neoplasm, brain, Nephrolithiasis, Neurotrophic keratoconjunctivitis, and Osteoporosis     Scheduled Medications:  atorvastatin, 40 mg, Daily  carvediloL, 12.5 mg, BID  dexAMETHasone, 4 mg, Q12H  diltiaZEM, 30 mg, Q6H  erythromycin, , QID  gabapentin, 300 mg, TID  hydrALAZINE, 25 mg, Q8H  lactulose 10 gram/15 ml, 10 g, TID  losartan, 100 mg, Daily  metoclopramide HCl, 10 mg, TID AC  pantoprazole, 40 mg, Daily  polyethylene glycol, 17 g, Daily    Continuous Infusions:   PRN Medications:  bisacodyL, 10 mg, Daily PRN  hydrALAZINE, 10 mg, Q4H PRN  labetalol, 20 mg, Q4H PRN  sodium chloride 0.9%, 10 mL, PRN    Calorie Containing IV Medications: no significant kcals from medications at this time    Recent Labs   Lab 06/14/25  0344 06/15/25  1056 06/16/25  0830 06/19/25  0312     --  139 137   K 4.3  --  3.9 4.3   CALCIUM 8.1*  --  9.0 8.9   MG  --   --  2.10  --      --  104 101   CO2 27  --  29 29   BUN 29.4*  --  32.7* 24.5*   CREATININE 0.65  --  0.60 0.64   EGFRNORACEVR >60  --  >60 >60   *  --  138* 141*   WBC 10.04 8.46 9.11 10.09   HGB 13.2 14.0 13.1 13.5   HCT 40.4 43.6 40.9 40.5     Nutrition Orders:  Diet Minced & Moist (IDDSI Level 5) Mildly Thick Liquids (IDDSI Level 2)  Diet Cardiac  Dietary nutrition supplements BID; Boost Very High Calorie Nutritional Drink - Strawberry  Appetite/Oral Intake: fair/25-50% of meals  Factors Affecting Nutritional Intake: difficulty/impaired swallowing on modified texture diet  Social Needs Impacting Access to Food:  "none identified  Food/Adventism/Cultural Preferences: vanilla or strawberry oral supplement   Food Allergies: none reported  Last Bowel Movement: 06/18/25  Wound(s):      Comments    6/10/25 Reports poor appetite since 6/6/25, with nausea, vomiting and increased difficultly chewing/swallowing food. She did have a fair appetite prior to this, oral intake was inconsistent. Left side deficit from resected meningioma sx in 2020, would use right side of mouth for chewing without difficultly up until this past weekend. Family reports UBW ~130lbs in 2020 and has slowly lost unintentional weight. Thinks weight stable ~110lbs x1 year. Admit weight of 99lbs, unsure time frame of 10% weight loss. Will drink vanilla or strawberry ONS once diet resumed. Tube feeding recommendation above if unable to resume oral diet.     6/11/25 Tolerating tube feedings at 35mL/hr this morning. Denies any GI complaints.     6/13/25 PEG placement today. Was tolerating tube feedings at goal. Last BM 6/9, receiving stool softeners.     6/17/25 Tube feedings have been held many times since PEG placed last week. Currently on hold s/t "sour" stomach and reflux/belching. No nausea or vomiting reports. Receiving laxatives, stool softeners and enemas x2 days to promote bowel movements. Had one yesterday and a good one this morning. Plans to resume tube feedings. Will trial incremental advancement of bolus feeds (4-5 feeds daily) to allow rest time from pump. May need additional free water flushes until goal bolus tube feedings achieved.      6/19/25 Tube feedings held yesterday morning s/t >200mL residuals pulled in AM. Discussed with MD, Reglan was added. Has had >4 good bowel movements since 6/17. Reports of patient receiving some continuous feedings overnight? Discussed small volume bolus tube feedings with today's nurse. Will trial 80mL bolus q4hr as tolerated all today and increase volume tomorrow if tolerated. Discussed this with patient and family " "at bedside. No nausea or bowel movement this morning.  Addendum 15:33: spoke to RN, has tolerated 2- 80mL tube feeding boluses so far today. Going down for a MBS.     25 Oral diet resumed yesterday to minced and moist solids and moderately thickened liquids. TF on hold, encouraging oral intake. She reports tolerating mashed potatoes and minced chicken last night. Ate 100% minced sausage and peaches for breakfast. States some belching and reflux but no nausea. Notified RN. Discontinued tube feeding order for now.     Anthropometrics    Height: 5' 2" (157.5 cm),    Last Weight: 45.3 kg (99 lb 13.9 oz) (25 1017), Weight Method: Standard Scale   BMI 18.27kg/m^2  BMI Classification: underweight (BMI less than 18.5)     Ideal Body Weight (IBW), Female: 110 lb                          Usual Body Weight (UBW), k kg  % Usual Body Weight: 90.79     Usual Weight Provided By: patient and family/caregiver    Wt Readings from Last 5 Encounters:   25 45.3 kg (99 lb 13.9 oz)   24 47.3 kg (104 lb 3.2 oz)   24 49.8 kg (109 lb 12.6 oz)   24 49.9 kg (110 lb 0.2 oz)   23 49.9 kg (110 lb)     Weight Change(s) Since Admission:   25 no updated weights   Wt Readings from Last 1 Encounters:   25 1017 45.3 kg (99 lb 13.9 oz)   Admit Weight: 45.3 kg (99 lb 13.9 oz) (25 1017), Weight Method: Standard Scale    Estimated Needs    Weight Used For Calorie Calculations: 45.3 kg (99 lb 13.9 oz)  Energy Calorie Requirements (kcal): 1585-1812kacls/d (35-40kcals/kg) for weight gain  Energy Need Method: Kcal/kg  Weight Used For Protein Calculations: 45.3 kg (99 lb 13.9 oz)  Protein Requirements: 68g/d (1.5g/kg)   Fluid Requirements (mL): 1359mL fl/d (30ml/kg)        Enteral Nutrition    Patient not receiving enteral nutrition support at this time.     Parenteral Nutrition     Patient not receiving parenteral nutrition support at this time.    Evaluation of Received Nutrient " Intake    Calories: not meeting estimated needs  Protein: not meeting estimated needs    Patient Education     Not applicable.    Nutrition Diagnosis     PES: Inadequate energy intake related to inability to consume sufficient nutrients as evidenced by likely <50% EER since 6/6/25. (active)     PES: Severe acute disease or injury related malnutrition Related to inability to consume sufficient nutrients  As Evidenced by:  - weight loss: Unable to assess - energy intake: <= 50% for 5 days (meets criteria for <= 50% >= 5 days (severe - acute)) - muscle mass depletion: 2 areas of moderate muscle loss (Temporalis, Clavicle) - loss of subcutaneous fat: 3 areas of mild fat loss (Buccal, Infraorbital, Triceps Skinfold) active    Nutrition Interventions     Intervention(s): modified composition of meals/snacks, commercial beverage, prescription medication, and collaboration with other providers  Intervention(s): Enteral nutrition    Goal: Meet greater than 80% of nutritional needs by follow-up. (goal progressing)  Goal: Tolerate enteral nutrition infusion at goal by follow-up. (discontinued)    Nutrition Goals & Monitoring     Dietitian will monitor: food and beverage intake, energy intake, weight change, electrolyte/renal panel, beliefs/attitudes, glucose/endocrine profile, and gastrointestinal profile  Discharge planning: regular diet with texture modifications per SLP; monitor need for supplemental tube feedings   Nutrition Risk/Follow-Up: patient at increased nutrition risk; dietitian will follow-up twice weekly   Please consult if re-assessment needed sooner.

## 2025-06-20 NOTE — DISCHARGE SUMMARY
[facility] HOSPITAL MEDICINE - DISCHARGE SUMMARY    Patient Name: Kaity Cuevas  MRN: [id number]  Admission Date: 6/9/2025  Discharge Date: 06/20/2025  Hospital Length of Stay: 11 days  Discharge Provider: Zane Hoskins MD  Primary Care Provider: Tabby Parker MD      HOSPITAL COURSE   64 year old with a history that includes meningioma, s/p resection 2020 and radiation x 2 (2009 and 2021), presented to ED 6/9 with worsening left sided weakness.  CT head non-specific findings in the right frontal lobe suspicious for acute infarct; in addition noting interval enlargement of left CP meningioma since 07/18/2024.   Patient was admitted to  services; Neurology and Neurosurgery.  Subsequent MRI ruled out acute CVA, noting residual meningioma at the cerebellopontine angle, larger than the prior examination and with associated surrounding edema. Patient started on IV decadron.  Neurosurgery on board.  Neurology signed off.       Patient failed swallow. Will consult GI for PEG tube. Continue steroid taper as per neurosurgery. S/p PEG tube on 6/13.  She was not tolerating goal tube feeds due to high residual volume.  Yesterday she was placed on a oral diet by speech therapist and has been eating well she ate over half of her dinner yesterday and was eating her breakfast this morning.  No further issues at this time, plan for discharge to rehab.  Neurosurgery recommended 4 mg b.i.d. for week and then 2 mg b.i.d. until she follows up with them.  Her metoprolol was changed to Coreg for better blood pressure control, Coreg can also go up some if needed if her blood pressure remains elevated or heart rate needs better control.        PHYSICAL EXAM     Most Recent Vital Signs:  Temp: 97.4 °F (36.3 °C) (06/20/25 0813)  Pulse: (!) 111 (06/20/25 0813)  Resp: 16 (06/20/25 0400)  BP: 134/86 (06/20/25 0813)  SpO2: 97 % (06/20/25 0813)   GENERAL: In no acute distress, afebrile  HEENT:  CHEST: Clear  to auscultation bilaterally  HEART: S1, S2, no appreciable murmur  ABDOMEN: Soft, nontender, BS +  MSK: Warm, no lower extremity edema, no clubbing or cyanosis  NEUROLOGIC: Alert and oriented x4, moving all extremities with good strength, left facial droop           DISCHARGE DIAGNOSIS   Residual meningioma at the cerebellopontine angle appears larger than the prior examination and there is some increased surrounding edema.  Old area of focal hemorrhage or calcification in the posterior cerebral convexity on the right side  Hx of meningioma, s/p resection 2020 and radiation x 2 (2009 and 2021)  HTN  Dysphagia 2/2 meningioma  Steroid induced leukocytosis        _____________________________________________________________________________      DISCHARGE MED REC     Current Discharge Medication List        START taking these medications    Details   carvediloL (COREG) 12.5 MG tablet Take 1 tablet (12.5 mg total) by mouth 2 (two) times daily.  Qty: 60 tablet, Refills: 0    Comments: .      dexAMETHasone (DECADRON) 2 MG tablet Take 2 tablets (4 mg total) by mouth every 12 (twelve) hours. for 3 days  Qty: 12 tablet, Refills: 0           CONTINUE these medications which have NOT CHANGED    Details   aspirin (ECOTRIN) 81 MG EC tablet Take 1 tablet by mouth once daily.      atorvastatin (LIPITOR) 10 MG tablet Take 10 mg by mouth every evening.      biotin 5,000 mcg TbDL Take 1 tablet by mouth Daily.      calcium carbonate (OS-MATEO) 600 mg calcium (1,500 mg) Tab Take 600 mg by mouth 2 (two) times daily with meals.      diltiaZEM (CARDIZEM CD) 180 MG 24 hr capsule Take 180 mg by mouth once daily.      ergocalciferol (ERGOCALCIFEROL) 50,000 unit Cap Take 50,000 Units by mouth every 6 weeks.      !! erythromycin (ROMYCIN) ophthalmic ointment Place into the left eye 4 (four) times daily.      estradioL (ESTRACE) 1 MG tablet Take 1 mg by mouth nightly.      gabapentin (NEURONTIN) 300 MG capsule TAKE 1 CAPSULE BY MOUTH THREE TIMES A  DAY  Qty: 90 capsule, Refills: 2    Associated Diagnoses: Spondylosis without myelopathy or radiculopathy, cervical region; Cervical spondylosis without myelopathy      ipratropium (ATROVENT) 21 mcg (0.03 %) nasal spray 2 sprays by Each Nostril route 3 (three) times daily.      losartan (COZAAR) 100 MG tablet Take 100 mg by mouth once daily.      trospium (SANCTURA) 20 mg Tab tablet Take 20 mg by mouth 2 (two) times daily.      vibegron (GEMTESA) 75 mg Tab Take 75 mg by mouth Daily.      vitamin K2 100 mcg Cap Take 1 capsule by mouth Daily.      denosumab (PROLIA) 60 mg/mL Syrg Inject 60 mg into the skin every 6 (six) months.      !! erythromycin base (ERYTHROMYCIN OPHT) Apply to eye 3 (three) times daily.      folic acid (FOLVITE) 1 MG tablet Take 1 tablet by mouth once daily.       !! - Potential duplicate medications found. Please discuss with provider.        STOP taking these medications       amLODIPine (NORVASC) 10 MG tablet Comments:   Reason for Stopping:         metoprolol succinate (TOPROL-XL) 100 MG 24 hr tablet Comments:   Reason for Stopping:         HYDROcodone-acetaminophen (NORCO) 5-325 mg per tablet Comments:   Reason for Stopping:                  CONSULTS     Consults (From admission, onward)          Status Ordering Provider     Inpatient consult to Neurosurgery  Once        Provider:  Michael Etienne MD    Completed LUIS MIGUEL OSPINA     Inpatient consult to Gastroenterology  Once        Provider:  Dustin Priest MD    Completed LUIS MIGUEL OSPINA     IP consult to case management/social work  Once        Provider:  (Not yet assigned)    ANTONIA Roman     Inpatient consult to Neurology Services (Vascular Neurology)  Once        Provider:  Daniel Solares MD    Completed KAYCEE MORRELL     Inpatient consult to Neurosurgery  Once        Provider:  Michael Etienne MD    Completed DESI DAVIES              FOLLOW UP      Follow-up Information        Tabby Parker MD Follow up in 2 week(s).    Specialty: Family Medicine  Contact information:  [address] [facility] [address]  [facility] [address]  [phone]               Michael Etienne MD Follow up in 2 week(s).    Specialty: Neurosurgery  Contact information:  [address] [place] [address] [address]  [place] [address]  [phone]                                 DISCHARGE INSTRUCTIONS     Explained in detail to the patient about the discharge plan, medications, and follow-up visits. Pt understands and agrees with the treatment plan.  Discharged Condition: stable  Diet as tolerated  Activities as tolerated  Discharge to: Rehab Facility    TIME SPENT ON DISCHARGE   35 minutes        Zane Hoskins MD  Internal Medicine  Department of Hospital Medicine [facility]      This document was created using electronic dictation services.  Please excuse any errors that may have been made.  Contact me if any questions regarding documentation to clarify verbiage.

## 2025-06-20 NOTE — PLAN OF CARE
Problem: Adult Inpatient Plan of Care  Goal: Plan of Care Review  Outcome: Met  Goal: Patient-Specific Goal (Individualized)  Outcome: Met  Goal: Absence of Hospital-Acquired Illness or Injury  Outcome: Met  Goal: Optimal Comfort and Wellbeing  Outcome: Met  Goal: Readiness for Transition of Care  Outcome: Met     Problem: Infection  Goal: Absence of Infection Signs and Symptoms  Outcome: Met     Problem: Stroke, Ischemic (Includes Transient Ischemic Attack)  Goal: Optimal Coping  Outcome: Met  Goal: Effective Bowel Elimination  Outcome: Met  Goal: Optimal Cerebral Tissue Perfusion  Outcome: Met  Goal: Optimal Cognitive Function  Outcome: Met  Goal: Improved Communication Skills  Outcome: Met  Goal: Optimal Functional Ability  Outcome: Met  Goal: Optimal Nutrition Intake  Outcome: Met  Goal: Effective Oxygenation and Ventilation  Outcome: Met  Goal: Improved Sensorimotor Function  Outcome: Met  Goal: Safe and Effective Swallow  Outcome: Met  Goal: Effective Urinary Elimination  Outcome: Met     Problem: Wound  Goal: Optimal Coping  Outcome: Met  Goal: Optimal Functional Ability  Outcome: Met  Goal: Absence of Infection Signs and Symptoms  Outcome: Met  Goal: Improved Oral Intake  Outcome: Met  Goal: Optimal Pain Control and Function  Outcome: Met  Goal: Skin Health and Integrity  Outcome: Met  Goal: Optimal Wound Healing  Outcome: Met     Problem: Skin Injury Risk Increased  Goal: Skin Health and Integrity  Outcome: Met     Problem: Fall Injury Risk  Goal: Absence of Fall and Fall-Related Injury  Outcome: Met

## 2025-06-20 NOTE — PT/OT/SLP PROGRESS
[facility]  Speech Language Pathology Department  Diet Tolerance Follow-up    Patient Name:  Kaity Cuevas   MRN:  [id number]  Admitting Diagnosis: Stroke    Recommendations     General recommendations:  dysphagia therapy  Solid texture recommendation:  Minced & Moist Diet - IDDSI Level 5  Liquid consistency recommendation: Mildly thick liquids - IDDSI Level 2   Medications: crushed in puree  Swallow strategies/precautions: small bites/sips, slow rate, upright for PO intake, and assist with feeding as needed  Precautions: aspiration    Diet Tolerance     Nursing reports no difficulty regarding diet tolerance.    Outcome Measures     Functional Oral Intake Scale: 5 - Total oral diet with multiple consistencies, by requiring special preparation or compensations    Plan     SLP Follow-Up:  Yes    Patient to be seen:  5 x/week   Plan of Care expires:  07/03/25 06/20/2025

## 2025-06-20 NOTE — PLAN OF CARE
06/20/25 1030   Final Note   Anticipated Discharge Disposition Rehab   Post-Acute Status   Post-Acute Authorization Other  ([facility])   Discharge Delays None known at this time     Patient has been accepted to [facility]. Approval received from Dr. Hoskins to let her go by personal vehicle. Discharge docs and clinical updates sent to [facility].  No further discharge planning needs identified at this time.

## 2025-06-20 NOTE — PT/OT/SLP PROGRESS
Occupational Therapy   Treatment    Name: Kaity Cuevas  MRN: [id number]    Recommendations:     Recommended therapy intensity at discharge: High Intensity Therapy   Discharge Equipment Recommendations:  to be determined by next level of care  Barriers to discharge:       Assessment:     Kaity Cuevas is a 64 y.o. female with a medical diagnosis of Stroke.  She presents with good participation and motivation. Performance deficits affecting function are weakness, impaired endurance, impaired self care skills, impaired functional mobility, gait instability, impaired balance, decreased safety awareness, decreased lower extremity function, decreased upper extremity function, impaired coordination.     Rehab Prognosis:  Good; patient would benefit from acute skilled OT services to address these deficits and reach maximum level of function.       Plan:     Patient to be seen 6 x/week to address the above listed problems via self-care/home management, therapeutic activities, therapeutic exercises, neuromuscular re-education  Plan of Care Expires: 07/08/25  Plan of Care Reviewed with: patient    Subjective     Pain/Comfort:  Pain Rating 1: 0/10    Objective:     Communicated with: nurse prior to session.  Patient found up in chair with PEG Tube upon OT entry to room.    General Precautions: Standard, aspiration    Orthopedic Precautions:N/A  Braces: N/A  Respiratory Status: Room air     Occupational Performance:     Functional Mobility/Transfers:  Transfers: Chair to Wheelchair: minimum assistance with gait belt using Step Transfer  Wheelchair to Car: minimum assistance with gait belt using Step Transfer    Activities of Daily Living:  Family training with transfer    [facility] 6 Click ADL: 18    Co-Treatment: No    Patient Education:  Patient and spouse were provided with verbal education and demonstrations education regarding fall prevention and safety awareness.  Understanding was verbalized.      Patient left in  vehicle with .    GOALS:   Multidisciplinary Problems       Occupational Therapy Goals          Problem: Occupational Therapy    Goal Priority Disciplines Outcome Interventions   Occupational Therapy Goal     OT, PT/OT Progressing    Description: Goals to be met by: 7/8/25     Patient will increase functional independence with ADLs by performing:    UE Dressing with Modified [place].  LE Dressing with Modified [place].  Grooming while standing with Modified [place].  Toileting from toilet with Modified [place] for hygiene and clothing management.   Toilet transfer to toilet with Modified [place]. Progress from commode as appropriate.                          Time Tracking:     OT Date of Treatment: 06/20/25  OT Start Time: 1217  OT Stop Time: 1246  OT Total Time (min): 29 min    Billable Minutes:Therapeutic Activity 29    Supervising Occupational Therapist: LUKE Santoyo  OT/ELIZABETH: ELIZABETH     Number of ELIZABETH visits since last OT visit: 5    6/20/2025

## 2025-06-20 NOTE — ANESTHESIA POSTPROCEDURE EVALUATION
Anesthesia Post Evaluation    Patient: Kaity Cuevas    Procedure(s) Performed: Procedure(s) (LRB):  PEG (N/A)    Final Anesthesia Type: general      Patient location during evaluation: PACU  Patient participation: Yes- Able to Participate  Level of consciousness: awake and alert  Post-procedure vital signs: reviewed and stable  Pain management: adequate  Airway patency: patent      Anesthetic complications: no      Cardiovascular status: blood pressure returned to baseline  Respiratory status: unassisted  Hydration status: euvolemic  Follow-up not needed.              Vitals Value Taken Time   /99 06/13/25 12:31   Temp 36 °C (96.8 °F) 06/13/25 12:13   Pulse 88 06/13/25 12:31   Resp 17 06/13/25 12:31   SpO2 97 % 06/13/25 12:31         No case tracking events are documented in the log.      Pain/Thomas Score: No data recorded

## 2025-07-08 ENCOUNTER — TELEPHONE (OUTPATIENT)
Dept: NEUROSURGERY | Facility: CLINIC | Age: 65
End: 2025-07-08
Payer: MEDICARE

## 2025-07-08 NOTE — TELEPHONE ENCOUNTER
Of note, she last seen Dr. Lagos on 9/12/24 and has an appointment with him on 9/15/25 with a MRI C/B.

## 2025-07-09 DIAGNOSIS — I63.9 CEREBROVASCULAR ACCIDENT (CVA), UNSPECIFIED MECHANISM: Primary | ICD-10-CM

## 2025-07-09 NOTE — TELEPHONE ENCOUNTER
She will need a hospital follow up with the week of 7/21 with repeat CT scan of the head 2 days prior to the visit.

## 2025-07-09 NOTE — TELEPHONE ENCOUNTER
I spoke with the patient to get her scheduled per Anna's request. She is scheduled to see Cari on 7/24/25 at 3:00. Her CT scan is scheduled at [facility] for 7/22/25 at 1:00. She was compliant w dates and times. Anna, can you verify that I put the correct DX for CT?

## 2025-07-22 ENCOUNTER — HOSPITAL ENCOUNTER (OUTPATIENT)
Dept: RADIOLOGY | Facility: HOSPITAL | Age: 65
Discharge: HOME | End: 2025-07-22
Attending: PHYSICIAN ASSISTANT
Payer: MEDICARE

## 2025-07-22 DIAGNOSIS — I63.9 CEREBROVASCULAR ACCIDENT (CVA), UNSPECIFIED MECHANISM: ICD-10-CM

## 2025-07-22 PROCEDURE — 70450 CT HEAD/BRAIN W/O DYE: CPT | Mod: TC

## 2025-07-24 ENCOUNTER — OFFICE VISIT (OUTPATIENT)
Dept: NEUROSURGERY | Facility: CLINIC | Age: 65
End: 2025-07-24
Payer: MEDICARE

## 2025-07-24 ENCOUNTER — HOSPITAL ENCOUNTER (OUTPATIENT)
Dept: RADIOLOGY | Facility: HOSPITAL | Age: 65
Discharge: HOME | End: 2025-07-24
Attending: PHYSICIAN ASSISTANT
Payer: MEDICARE

## 2025-07-24 VITALS
HEIGHT: 62 IN | HEART RATE: 98 BPM | DIASTOLIC BLOOD PRESSURE: 78 MMHG | BODY MASS INDEX: 17.66 KG/M2 | WEIGHT: 96 LBS | RESPIRATION RATE: 16 BRPM | SYSTOLIC BLOOD PRESSURE: 131 MMHG

## 2025-07-24 DIAGNOSIS — I61.9 HEMORRHAGIC STROKE: ICD-10-CM

## 2025-07-24 DIAGNOSIS — D32.9 BENIGN MENINGIOMA: Primary | ICD-10-CM

## 2025-07-24 DIAGNOSIS — D32.9 BENIGN MENINGIOMA: ICD-10-CM

## 2025-07-24 DIAGNOSIS — R04.0 BLEEDING FROM THE NOSE: ICD-10-CM

## 2025-07-24 PROCEDURE — A9577 INJ MULTIHANCE: HCPCS | Performed by: PHYSICIAN ASSISTANT

## 2025-07-24 PROCEDURE — 25500020 PHARM REV CODE 255: Performed by: PHYSICIAN ASSISTANT

## 2025-07-24 PROCEDURE — 70553 MRI BRAIN STEM W/O & W/DYE: CPT | Mod: TC

## 2025-07-24 PROCEDURE — 99214 OFFICE O/P EST MOD 30 MIN: CPT | Mod: ,,, | Performed by: PHYSICIAN ASSISTANT

## 2025-07-24 RX ORDER — METOPROLOL SUCCINATE 50 MG/1
50 TABLET, EXTENDED RELEASE ORAL EVERY MORNING
COMMUNITY
End: 2025-07-24

## 2025-07-24 RX ORDER — DEXAMETHASONE 4 MG/1
TABLET ORAL
COMMUNITY
End: 2025-07-24

## 2025-07-24 RX ORDER — ASPIRIN 325 MG
TABLET, DELAYED RELEASE (ENTERIC COATED) ORAL
COMMUNITY

## 2025-07-24 RX ORDER — MUPIROCIN 20 MG/G
OINTMENT TOPICAL 3 TIMES DAILY
Qty: 22 G | Refills: 2 | Status: SHIPPED | OUTPATIENT
Start: 2025-07-24

## 2025-07-24 RX ORDER — PHENAZOPYRIDINE HYDROCHLORIDE 100 MG/1
100 TABLET, FILM COATED ORAL 3 TIMES DAILY
COMMUNITY
Start: 2025-02-12 | End: 2025-07-24

## 2025-07-24 RX ORDER — NITROFURANTOIN 25; 75 MG/1; MG/1
100 CAPSULE ORAL 2 TIMES DAILY
COMMUNITY
Start: 2025-04-14 | End: 2025-07-24

## 2025-07-24 RX ADMIN — GADOBENATE DIMEGLUMINE 9 ML: 529 INJECTION, SOLUTION INTRAVENOUS at 04:07

## 2025-07-24 NOTE — PROGRESS NOTES
"[facility]  [facility]  Neurosurgery      Kaity Cuevas   [id number]   1960       SUBJECTIVE:     Chief Complaint:   No chief complaint on file.       History of Present Illness:   Patient is a 64 y.o. right handed female is seen today for a hospital follow up appointment.    The patient presented to the emergency department on 06/09/2025 secondary to left-sided weakness.  She has been experiencing difficulty swallowing.  Therefore she was noncompliant with her antihypertensive medications.  As a result, her blood pressure was found to be elevated.  MRI of the brain with and without contrast was obtained on 06/09/2025.  This study showed increased size of the residual left CPA tumor with surrounding edema as well as parenchymal hemorrhage in the area surrounding the tumor.  The 4th ventricle is open.  There was no hydrocephalus.  She improved through the hospital stay.  She was able to be discharged on 06/20/2025 to inpatient rehab.    She presents today for evaluation with her .  She reports that each day is improving since the event and appears optimistic about recovery, stating "every day is a good day."  She acknowledges the bleed's proximity to the brainstem was causing some compression.  She reports left-sided weakness with significant difficulty walking, requiring assistance to ambulate.  She initially dragged her left side when first experiencing symptoms and currently has poor coordination with balance issues.  She experiences dizziness when bending or looking down, with only right-sided control feeling stable.  She has numbness in her right hand and 3rd, 4th, and 5th fingers that feels "funny" but without pins and needles sensation.  She is actively working on relearning walking skills and appears aware of her neurological symptoms and their potential relationship to her recent medical condition.  She experiences ongoing swallowing difficulties currently being " addressed by the medical team.  She has intermittent nose bleeds characterized as small amounts of blood when cleaning her nose, not profuse bleeding.  She is currently participating in outpatient rehabilitation, attending therapy three days per week including occupational, physical, and speech therapy. She continues to have a PEG tube in place, which is expected to remain for another month.  She has tapered off of the steroids.           Past Medical History:   Diagnosis Date    Benign meningioma     Carpal tunnel syndrome on right     Cataract     Cervical disc displacement     Cervical spondylosis     Hard of hearing     Hyperlipidemia     Hypertension     Neoplasm, brain     Nephrolithiasis     Neurotrophic keratoconjunctivitis     Osteoporosis         Past Surgical History:   Procedure Laterality Date    BRAIN SURGERY  06/04/2020    CARPAL TUNNEL RELEASE Right 06/07/2024    Procedure: RELEASE, CARPAL TUNNEL;  Surgeon: Yuriy Stark MD;  Location: [facility] OR;  Service: Orthopedics;  Laterality: Right;    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  07/26/2021    COLONOSCOPY      cyberknife for left petroclival tumor  04/20/2009    ESOPHAGOGASTRODUODENOSCOPY W/ PEG N/A 6/13/2025    Procedure: PEG;  Surgeon: Dustin Priest MD;  Location: [facility] ENDOSCOPY;  Service: Endoscopy;  Laterality: N/A;    EYE SURGERY      HYSTERECTOMY  2009    Left C5-6, C6-7 posterior foraminotomies  08/20/2014    [place]    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  06/04/2020    [facility]    removal of kidney stone  1995    TRIGGER FINGER RELEASE Right 06/07/2024    Procedure: RELEASE, TRIGGER FINGER; 3rd and 4th finger;  Surgeon: Yuriy Stark MD;  Location: [facility] OR;  Service: Orthopedics;  Laterality: Right;        Social History     Tobacco Use    Smoking status: Never    Smokeless tobacco: Never   Substance Use Topics    Alcohol use: Not Currently     Comment: rarely        Family History   Problem Relation Name Age  of Onset    Kidney disease Mother Maribel Herrera     Hyperlipidemia Mother Maribel Herrera     Hypertension Mother Maribel Herrera     Arthritis Mother Maribel Herrera     Heart disease Mother Maribel Herrera     Stroke Father Stanley Herrera     Kidney disease Father Stanley Herrera     Skin cancer Father Stanley Herrera     Hyperlipidemia Father Stanley Herrera     Hypertension Father Stanley Herrera     Cancer Father Stanley Herrera     Thyroid cancer Sister      Hypertension Sister      Hypertension Brother Stanley Herrera Jr     Diabetes Mellitus Paternal Grandmother Kassandra Herrera     Diabetes Paternal Grandmother Kassandra Herrera     Lung cancer Paternal Grandfather Scooter Cissene     Heart disease Paternal Grandfather Scooter Rogerszenne     Cancer Sister Veronika Brayan     Hypertension Sister Veronika Brayan     Miscarriages / Stillbirths Sister Veronika Brayan     Cancer Maternal Uncle Jonathan Benavides     Cancer Paternal Uncle Juanis Herrera     Diabetes Paternal Aunt Candie Mandujano     Early death Paternal Aunt Mimi Duque     Hypertension Brother Sebastien Herrera     Hypertension Sister Nataliia Simmons         Review of patient's allergies indicates:   Allergen Reactions    Codeine     Sulfa (sulfonamide antibiotics) Rash and Other (See Comments)    Sulfamethoxazole-trimethoprim Rash        Current Outpatient Medications   Medication Instructions    aspirin (ECOTRIN) 81 MG EC tablet 1 tablet, Daily    atorvastatin (LIPITOR) 10 mg, Nightly    biotin 5,000 mcg TbDL 1 tablet, Daily    calcium carbonate (OS-MATEO) 600 mg, 2 times daily with meals    carvediloL (COREG) 12.5 mg, Oral, 2 times daily    cholecalciferol, vitamin D3, 1,250 mcg (50,000 unit) capsule TAKE 1 CAPSULE BY MOUTH EVERY 6 WEEKS    diltiaZEM (CARDIZEM CD) 180 mg, Daily    ergocalciferol (ERGOCALCIFEROL) 50,000 Units, Every 6 weeks    erythromycin (ROMYCIN) ophthalmic ointment 4 times daily    estradioL (ESTRACE) 1 mg, Nightly    folic acid (FOLVITE) 1 MG tablet 1 tablet,  "Daily    gabapentin (NEURONTIN) 300 mg, Oral, 3 times daily    ipratropium (ATROVENT) 21 mcg (0.03 %) nasal spray 2 sprays, 3 times daily    losartan (COZAAR) 100 mg, Daily    PROLIA 60 mg, Every 6 months    trospium (SANCTURA) 20 mg, 2 times daily    vitamin K2 100 mcg Cap 1 capsule, Daily       Review of Systems   Constitutional:  Negative for chills and fever.   HENT:  Positive for nosebleeds and trouble swallowing. Negative for sore throat.    Eyes:  Negative for pain and visual disturbance.   Respiratory:  Negative for cough, chest tightness and shortness of breath.    Cardiovascular:  Negative for chest pain.   Gastrointestinal:  Negative for diarrhea, nausea and vomiting.   Genitourinary:  Negative for difficulty urinating, dysuria and hematuria.   Musculoskeletal:  Positive for gait problem. Negative for myalgias.   Skin:  Negative for rash.   Neurological:  Positive for dizziness, facial asymmetry, weakness and numbness. Negative for headaches.   Psychiatric/Behavioral:  Negative for confusion and sleep disturbance. The patient is not nervous/anxious.        OBJECTIVE:       Visit Vitals  /78 (BP Location: Right arm, Patient Position: Sitting)   Pulse 98   Resp 16   Ht 5' 2" (1.575 m)   Wt 43.5 kg (96 lb)   BMI 17.56 kg/m²        General:  Pleasant, thin, Well-groomed.    Head:  Normocephalic. Atraumatic     Lungs:  Breathing is quiet with non-labored respirations.    Neurological:   Awake, alert, and oriented   Speech is fluent.  Memory, cognition, and affect are appropriate.  Extraocular movements are intact.  Left facial weakness  Strength in Left arm and leg is antigravity.  Wheelchair      Imaging:  All pertinent neuroimaging independently reviewed. Discussed these findings in detail with the patient.      ASSESSMENT:     1. Benign meningioma  MRI Brain W WO Contrast    Creatinine, serum      2. Hemorrhagic stroke  MRI Brain W WO Contrast      3. Bleeding from the nose  mupirocin (BACTROBAN) 2 % " ointment        PLAN:     INTRACEREBRAL HEMORRHAGE:  - Reviewed CT brain results showing some of the hemorrhagic brain bleed has been absorbed.  - Discussed brain tumor conference findings: repeat imaging recommended at 6 weeks post-event.  - Considered timing of potential radiation therapy, deciding to wait for brain swelling to further subside before pursuing due to potential increased sensitivity to side effects.    LEFT-SIDED HEMIPLEGIA:  - Left-sided weakness and coordination issues are ongoing effects of the brain bleed.    CEREBRAL MENINGIOMA:  - Ordered MRI brain to be completed within the next week, preferably at [facility].    DYSPHAGIA AND SPEECH ISSUES:  - Explained the relationship between speech and swallowing functions in stroke-like events.  - Discussed the impact of brain damage location on specific symptoms.    EPISTAXIS:  - Nasal bleeding likely due to dry nasal mucosa rather than stroke-related.  - Started mupirocin ointment for nasal mucosa: Apply pea-sized amount to Q-tip, swab inside each nostril, using opposite ends of Q-tip, pinch nose to spread ointment, use twice daily for about a week.         A total of 21 minutes was spent face-to-face with the patient during this encounter.  Over half of that time was spent on counseling and coordination of care.  Additional 10+ minutes were used to reviewed the patient's chart including hospital records and ED notes, multiple CT head images and reports, MRI brain images and reports, and work on office note.      Cari Guan PA-C       Disclaimer:  This note is prepared using voice recognition software and as such is likely to have errors despite attempts at proofreading.

## 2025-08-01 ENCOUNTER — TELEPHONE (OUTPATIENT)
Dept: NEUROSURGERY | Facility: CLINIC | Age: 65
End: 2025-08-01
Payer: MEDICARE

## 2025-08-01 DIAGNOSIS — D32.9 BENIGN MENINGIOMA: Primary | ICD-10-CM

## 2025-08-01 NOTE — TELEPHONE ENCOUNTER
I discussed the patient and her situation with Dr. Lagos earlier this week.  He reviewed her repeat MRI brain with and without contrast.  There has been improvement in the area of the hemorrhage.  He would like her to have the MRI brain with and without contrast repeated at 3 months.  I contacted the patient and made her aware of his recommendations.  She voiced understanding.    Marcela, please schedule her to have the MRI brain with and without contrast repeated at 3 months from the last scan, 07/24/2025.  Have her see Dr. Lagos after the scan has been obtained.  I did not ask her where she would like to have the MRI done.

## 2025-08-06 ENCOUNTER — LAB VISIT (OUTPATIENT)
Dept: LAB | Facility: HOSPITAL | Age: 65
End: 2025-08-06
Attending: PHYSICIAN ASSISTANT
Payer: MEDICARE

## 2025-08-06 DIAGNOSIS — D32.9 BENIGN MENINGIOMA: ICD-10-CM

## 2025-08-06 DIAGNOSIS — M47.812 CERVICAL SPONDYLOSIS: Primary | ICD-10-CM

## 2025-08-06 DIAGNOSIS — M47.812 SPONDYLOSIS WITHOUT MYELOPATHY OR RADICULOPATHY, CERVICAL REGION: ICD-10-CM

## 2025-08-06 DIAGNOSIS — M47.812 CERVICAL SPONDYLOSIS WITHOUT MYELOPATHY: ICD-10-CM

## 2025-08-06 LAB
CREAT SERPL-MCNC: 0.62 MG/DL (ref 0.55–1.02)
GFR SERPLBLD CREATININE-BSD FMLA CKD-EPI: >60 ML/MIN/1.73/M2

## 2025-08-06 PROCEDURE — 82565 ASSAY OF CREATININE: CPT

## 2025-08-06 PROCEDURE — 36415 COLL VENOUS BLD VENIPUNCTURE: CPT

## 2025-08-06 RX ORDER — GABAPENTIN 300 MG/1
300 CAPSULE ORAL 3 TIMES DAILY
Qty: 90 CAPSULE | Refills: 2 | Status: SHIPPED | OUTPATIENT
Start: 2025-08-06

## 2025-08-14 ENCOUNTER — PATIENT MESSAGE (OUTPATIENT)
Dept: NEUROSURGERY | Facility: CLINIC | Age: 65
End: 2025-08-14
Payer: MEDICARE

## (undated) DEVICE — BANDAGE VELCLOSE ELAS 3INX5YD

## (undated) DEVICE — SPONGE COTTON TRAY 4X4IN

## (undated) DEVICE — TUBING O2 FEMALE CONN 13FT

## (undated) DEVICE — KIT SURGICAL TURNOVER

## (undated) DEVICE — BINDER ABDOM 4PANEL 12IN SM/MD

## (undated) DEVICE — GLOVE SENSICARE PI MICRO 8

## (undated) DEVICE — TIP SUCTION YANKAUER

## (undated) DEVICE — SOL NACL IRR 1000ML BTL

## (undated) DEVICE — KIT PEG PULL SAFETY 24FR

## (undated) DEVICE — KIT SURGICAL COLON .25 1.1OZ

## (undated) DEVICE — SUT BLK MONO 3-0 CUT 18IN F

## (undated) DEVICE — GLOVE SENSICARE PI GRN 6.5

## (undated) DEVICE — APPLICATOR CHLORAPREP ORN 26ML

## (undated) DEVICE — CUFF ATS 2 PORT SNGL BLDR 18IN

## (undated) DEVICE — GOWN POLY REINF X-LONG 2XL

## (undated) DEVICE — DRAPE STERI U-SHAPED 47X51IN

## (undated) DEVICE — Device

## (undated) DEVICE — GLOVE SENSICARE PI GRN 8.5

## (undated) DEVICE — GLOVE SIGNATURE ESSNTL LTX 6.5

## (undated) DEVICE — SOL IRRI STRL WATER 1000ML

## (undated) DEVICE — DRESSING XEROFORM NONADH 1X8IN

## (undated) DEVICE — ELECTRODE PATIENT RETURN DISP

## (undated) DEVICE — KIT CANIST SUCTION 1200CC

## (undated) DEVICE — COLLECTION SPECIMEN NEPTUNE

## (undated) DEVICE — DRAPE HAND STERILE